# Patient Record
Sex: MALE | Race: WHITE | NOT HISPANIC OR LATINO | Employment: FULL TIME | URBAN - NONMETROPOLITAN AREA
[De-identification: names, ages, dates, MRNs, and addresses within clinical notes are randomized per-mention and may not be internally consistent; named-entity substitution may affect disease eponyms.]

---

## 2020-01-23 ENCOUNTER — OFFICE VISIT (OUTPATIENT)
Dept: FAMILY MEDICINE CLINIC | Facility: CLINIC | Age: 40
End: 2020-01-23

## 2020-01-23 VITALS
DIASTOLIC BLOOD PRESSURE: 80 MMHG | SYSTOLIC BLOOD PRESSURE: 136 MMHG | OXYGEN SATURATION: 98 % | HEART RATE: 86 BPM | TEMPERATURE: 97.7 F | WEIGHT: 246 LBS | BODY MASS INDEX: 34.44 KG/M2 | HEIGHT: 71 IN

## 2020-01-23 DIAGNOSIS — F41.9 ANXIETY: ICD-10-CM

## 2020-01-23 DIAGNOSIS — D17.1 LIPOMA OF BACK: Primary | ICD-10-CM

## 2020-01-23 DIAGNOSIS — Z13.21 ENCOUNTER FOR VITAMIN DEFICIENCY SCREENING: ICD-10-CM

## 2020-01-23 DIAGNOSIS — D17.20 LIPOMA OF UPPER ARM: ICD-10-CM

## 2020-01-23 DIAGNOSIS — Z13.1 DIABETES MELLITUS SCREENING: ICD-10-CM

## 2020-01-23 DIAGNOSIS — I10 ESSENTIAL HYPERTENSION: ICD-10-CM

## 2020-01-23 DIAGNOSIS — R10.826 EPIGASTRIC ABDOMINAL TENDERNESS WITH REBOUND TENDERNESS: ICD-10-CM

## 2020-01-23 LAB
25(OH)D3 SERPL-MCNC: 24.1 NG/ML (ref 30–100)
ALBUMIN SERPL-MCNC: 4.9 G/DL (ref 3.5–5.2)
ALBUMIN/GLOB SERPL: 1.6 G/DL
ALP SERPL-CCNC: 67 U/L (ref 39–117)
ALT SERPL W P-5'-P-CCNC: 24 U/L (ref 1–41)
ANION GAP SERPL CALCULATED.3IONS-SCNC: 16.1 MMOL/L (ref 5–15)
AST SERPL-CCNC: 21 U/L (ref 1–40)
BILIRUB SERPL-MCNC: 0.5 MG/DL (ref 0.2–1.2)
BUN BLD-MCNC: 13 MG/DL (ref 6–20)
BUN/CREAT SERPL: 14.9 (ref 7–25)
CALCIUM SPEC-SCNC: 10.2 MG/DL (ref 8.6–10.5)
CHLORIDE SERPL-SCNC: 100 MMOL/L (ref 98–107)
CHOLEST SERPL-MCNC: 309 MG/DL (ref 0–200)
CO2 SERPL-SCNC: 24.9 MMOL/L (ref 22–29)
CREAT BLD-MCNC: 0.87 MG/DL (ref 0.76–1.27)
DEPRECATED RDW RBC AUTO: 39.1 FL (ref 37–54)
EOSINOPHIL # BLD MANUAL: 0.13 10*3/MM3 (ref 0–0.4)
EOSINOPHIL NFR BLD MANUAL: 2.2 % (ref 0.3–6.2)
ERYTHROCYTE [DISTWIDTH] IN BLOOD BY AUTOMATED COUNT: 12.4 % (ref 12.3–15.4)
GFR SERPL CREATININE-BSD FRML MDRD: 98 ML/MIN/1.73
GLOBULIN UR ELPH-MCNC: 3 GM/DL
GLUCOSE BLD-MCNC: 85 MG/DL (ref 65–99)
HBA1C MFR BLD: 5.53 % (ref 4.8–5.6)
HCT VFR BLD AUTO: 47.3 % (ref 37.5–51)
HDLC SERPL-MCNC: 40 MG/DL (ref 40–60)
HGB BLD-MCNC: 16.1 G/DL (ref 13–17.7)
LDLC SERPL CALC-MCNC: 236 MG/DL (ref 0–100)
LDLC/HDLC SERPL: 5.9 {RATIO}
LYMPHOCYTES # BLD MANUAL: 1.35 10*3/MM3 (ref 0.7–3.1)
LYMPHOCYTES NFR BLD MANUAL: 18.7 % (ref 5–12)
LYMPHOCYTES NFR BLD MANUAL: 23.1 % (ref 19.6–45.3)
MCH RBC QN AUTO: 29.4 PG (ref 26.6–33)
MCHC RBC AUTO-ENTMCNC: 34 G/DL (ref 31.5–35.7)
MCV RBC AUTO: 86.5 FL (ref 79–97)
MONOCYTES # BLD AUTO: 1.1 10*3/MM3 (ref 0.1–0.9)
NEUTROPHILS # BLD AUTO: 3.28 10*3/MM3 (ref 1.7–7)
NEUTROPHILS NFR BLD MANUAL: 56 % (ref 42.7–76)
PLAT MORPH BLD: NORMAL
PLATELET # BLD AUTO: 305 10*3/MM3 (ref 140–450)
PMV BLD AUTO: 10.6 FL (ref 6–12)
POTASSIUM BLD-SCNC: 4.5 MMOL/L (ref 3.5–5.2)
PROT SERPL-MCNC: 7.9 G/DL (ref 6–8.5)
RBC # BLD AUTO: 5.47 10*6/MM3 (ref 4.14–5.8)
RBC MORPH BLD: NORMAL
SMUDGE CELLS BLD QL SMEAR: ABNORMAL
SODIUM BLD-SCNC: 141 MMOL/L (ref 136–145)
T4 FREE SERPL-MCNC: 1.16 NG/DL (ref 0.93–1.7)
TRIGL SERPL-MCNC: 166 MG/DL (ref 0–150)
TSH SERPL DL<=0.05 MIU/L-ACNC: 2.62 UIU/ML (ref 0.27–4.2)
VIT B12 BLD-MCNC: 522 PG/ML (ref 211–946)
VLDLC SERPL-MCNC: 33.2 MG/DL (ref 5–40)
WBC NRBC COR # BLD: 5.86 10*3/MM3 (ref 3.4–10.8)

## 2020-01-23 PROCEDURE — 85007 BL SMEAR W/DIFF WBC COUNT: CPT | Performed by: NURSE PRACTITIONER

## 2020-01-23 PROCEDURE — 83036 HEMOGLOBIN GLYCOSYLATED A1C: CPT | Performed by: NURSE PRACTITIONER

## 2020-01-23 PROCEDURE — 99204 OFFICE O/P NEW MOD 45 MIN: CPT | Performed by: NURSE PRACTITIONER

## 2020-01-23 PROCEDURE — 82607 VITAMIN B-12: CPT | Performed by: NURSE PRACTITIONER

## 2020-01-23 PROCEDURE — 80061 LIPID PANEL: CPT | Performed by: NURSE PRACTITIONER

## 2020-01-23 PROCEDURE — 80053 COMPREHEN METABOLIC PANEL: CPT | Performed by: NURSE PRACTITIONER

## 2020-01-23 PROCEDURE — 84439 ASSAY OF FREE THYROXINE: CPT | Performed by: NURSE PRACTITIONER

## 2020-01-23 PROCEDURE — 84443 ASSAY THYROID STIM HORMONE: CPT | Performed by: NURSE PRACTITIONER

## 2020-01-23 PROCEDURE — 36415 COLL VENOUS BLD VENIPUNCTURE: CPT | Performed by: NURSE PRACTITIONER

## 2020-01-23 PROCEDURE — 82306 VITAMIN D 25 HYDROXY: CPT | Performed by: NURSE PRACTITIONER

## 2020-01-23 PROCEDURE — 85025 COMPLETE CBC W/AUTO DIFF WBC: CPT | Performed by: NURSE PRACTITIONER

## 2020-01-23 RX ORDER — LANSOPRAZOLE 30 MG/1
30 CAPSULE, DELAYED RELEASE ORAL DAILY
Qty: 30 CAPSULE | Refills: 5 | Status: SHIPPED | OUTPATIENT
Start: 2020-01-23 | End: 2020-10-15 | Stop reason: SDUPTHER

## 2020-01-23 RX ORDER — ATENOLOL 50 MG/1
50 TABLET ORAL 2 TIMES DAILY
COMMUNITY
Start: 2019-11-14 | End: 2020-01-23 | Stop reason: SDUPTHER

## 2020-01-23 RX ORDER — ATENOLOL 50 MG/1
50 TABLET ORAL 2 TIMES DAILY
Qty: 60 TABLET | Refills: 5 | Status: SHIPPED | OUTPATIENT
Start: 2020-01-23 | End: 2020-10-15 | Stop reason: SDUPTHER

## 2020-01-23 NOTE — PROGRESS NOTES
"Subjective   George Ribeiro is a 39 y.o. male.     Chief Complaint   Patient presents with   • Establish Care       History of Present Illness     Establish Care-here to establish PCP.  He has been under care of NANCY Baires.  He has not been seen in \"at least a year\".  No recent labs.    HTN-chronic and ongoing.  On Atenolol 50 mg.  No associated symptoms such as CP, SOA, HA, or dizziness.  Has been on for \"years\".  Began on 25 mg due to palpitations per Dr Vail.  He did have cardiac work up including stress and \"heart monitor\".  He reports then was increased to 50 mg.  BP checks at home 120/80's.  HR averages 60's.  He reports an occasional pain in his chest and is concerned about family history of aortic valve replacement his brother in his 30's.  He reports his father's replacement in his 60's.  He does have early family history of MI in multiple family members.  Pain is \"random\".  No daily.  Reports discomfort \"then gone\".  \"does not linger\".  Does not take take his breath but does occasionally have dizziness.  No sensation of syncope.    Abdomen complaint-has had multiple scopes \"both ways\".  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  Reports cramping sensation and nausea.  He reports will resolve with rest.  He reports he has noted that this \"labors his breathing\".  He reports scope showed some stomach irritation.  Pain is always epigastric and occasionally with swallowing he can feel that his food is moving down.  Has been on stomach meds but 'didn't help\" so he stopped the meds.  On Zantac then on Nexium.  Has not been on \"in a while\".  No obvious cough or GERD but does \"clear his throat a lot\".    Frontal HA-throbbing in his face.  Reports only occurs occasional and feels \"is sinus\". He does use OTC meds at times but feels that helps.  He does try to not take meds unless \"bad\".  Has been more consistent the last few weeks as allergy season has been " "more present  Back and neck problem-reports right sided back and and flank pain that lasted 2-3 weeks but has now resolved.  He reports some neck spasm that seems to be on the left and was not able to turn his head.  Has used heat.  Reports no specific injury.  Improved for a few days then returned.    Anxiety-reports he always have some underlying anxiety.  He reports he struggles with crowds and then had a \"full blown attack\".  He reports he woke with neck pain and then escalated to panic as he felt he was \"having a heart attack\".  Lasted 5-10 minutes.  Reports he got breathless and jittery.  He has never been on meds or treated for anxiety.    Lipoma-on Upper extremities and back.  Has been present \"15 years\".  Some have changed in size.  Occasional tenderness.  Has not ever had an evaluation of the soft tissue.      Review of Systems   Constitutional: Negative for appetite change, fatigue and unexpected weight change.   HENT: Positive for rhinorrhea. Negative for congestion, ear pain, nosebleeds, postnasal drip, sore throat, trouble swallowing and voice change.    Eyes: Negative for pain and visual disturbance.   Respiratory: Negative for cough, shortness of breath and wheezing.    Cardiovascular: Negative for chest pain and palpitations.   Gastrointestinal: Positive for abdominal pain. Negative for blood in stool, constipation and diarrhea.   Endocrine: Negative for cold intolerance and polydipsia.   Genitourinary: Negative for difficulty urinating, flank pain and hematuria.   Musculoskeletal: Positive for back pain and neck pain. Negative for arthralgias, gait problem, joint swelling and myalgias.   Skin: Negative for color change and rash.   Allergic/Immunologic: Negative.    Neurological: Positive for headaches. Negative for syncope and numbness.   Hematological: Negative.    Psychiatric/Behavioral: Negative for sleep disturbance and suicidal ideas.   All other systems reviewed and are " "negative.      Objective     /80 (BP Location: Right arm, Patient Position: Sitting, Cuff Size: Adult)   Pulse 86   Temp 97.7 °F (36.5 °C) (Temporal)   Ht 180.3 cm (71\")   Wt 112 kg (246 lb)   SpO2 98%   BMI 34.31 kg/m²     Physical Exam   Constitutional: He is oriented to person, place, and time. He appears well-developed and well-nourished. No distress.   HENT:   Head: Normocephalic and atraumatic.   Right Ear: Hearing, tympanic membrane, external ear and ear canal normal.   Left Ear: Hearing, tympanic membrane, external ear and ear canal normal.   Nose: No mucosal edema or rhinorrhea.   Mouth/Throat: Oropharynx is clear and moist and mucous membranes are normal.   Eyes: Pupils are equal, round, and reactive to light. Conjunctivae, EOM and lids are normal. No scleral icterus. Right eye exhibits normal extraocular motion and no nystagmus. Left eye exhibits normal extraocular motion and no nystagmus.   Neck: Normal range of motion. Neck supple. No JVD present. No tracheal tenderness present. Carotid bruit is not present. No thyromegaly present.   Cardiovascular: Normal rate, regular rhythm, S1 normal, S2 normal, normal heart sounds and intact distal pulses.   No murmur heard.  Pulmonary/Chest: Effort normal and breath sounds normal. He exhibits no tenderness.   Abdominal: Soft. Bowel sounds are normal. He exhibits no mass. There is no hepatosplenomegaly. There is generalized tenderness and tenderness in the epigastric area.   Musculoskeletal: Normal range of motion. He exhibits no edema or tenderness.   Gait normal.   equal bilaterally. No muscular atrophy or flaccidity.  Spastic left trapezius tenderness to left side of C-spine   Lymphadenopathy:     He has no cervical adenopathy.        Right cervical: No superficial cervical adenopathy present.       Left cervical: No superficial cervical adenopathy present.   Neurological: He is alert and oriented to person, place, and time. He has normal " strength and normal reflexes. He displays no tremor. No cranial nerve deficit or sensory deficit. He exhibits normal muscle tone. Coordination and gait normal.   Skin: Skin is warm and dry. Capillary refill takes less than 2 seconds. He is not diaphoretic. No cyanosis. No pallor. Nails show no clubbing.   1 cm mobile area in left upper arm, right upper arm and on mid back area.  Back lesion approx 2 cm   Psychiatric: His speech is normal and behavior is normal. Judgment and thought content normal. His mood appears anxious. He is not actively hallucinating. Cognition and memory are normal. He is attentive.   Vitals reviewed.      Assessment/Plan     Problem List Items Addressed This Visit     None      Visit Diagnoses     Lipoma of back    -  Primary    Relevant Orders    US soft tissue    Lipoma of upper arm        Relevant Orders    US soft tissue    Epigastric abdominal tenderness with rebound tenderness        Relevant Medications    lansoprazole (PREVACID) 30 MG capsule    Anxiety        Relevant Orders    TSH    T4, Free    Essential hypertension        Relevant Medications    atenolol (TENORMIN) 50 MG tablet    Other Relevant Orders    CBC & Differential    Comprehensive Metabolic Panel    Lipid Panel    Encounter for vitamin deficiency screening        Relevant Orders    Vitamin D 25 Hydroxy    Vitamin B12    Diabetes mellitus screening        Relevant Orders    Hemoglobin A1c          Patient's Body mass index is 34.31 kg/m². BMI is above normal parameters. Recommendations include: nutrition counseling.  Understands disease processes and need for medications.  Understands reasons for urgent and emergent care.  Patient (& family) verbalized agreement for treatment plan.   Emotional support and active listening provided.  Patient provided time to verbalize feelings.    US of soft tissue of back and arm  Will plan for cardiac work up.   Discussion regarding GERD and symptoms.  Trial of PPI for symptoms.       Fasting labs ordered.  Will call patient with results and make any meds changes PRN  Will continue Atenolol at present dosing.      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      RTC 1 month, sooner if needed.             This document has been electronically signed by:  NANCY Jessica, FNP-C    Dragon disclaimer:  Much of this encounter note is an electronic transcription/translation of spoken language to printed text. The electronic translation of spoken language may permit erroneous, or at times, nonsensical words or phrases to be inadvertently transcribed; Although I have reviewed the note for such errors, some may still exist.

## 2020-01-24 RX ORDER — ROSUVASTATIN CALCIUM 20 MG/1
20 TABLET, COATED ORAL DAILY
Qty: 30 TABLET | Refills: 5 | Status: SHIPPED | OUTPATIENT
Start: 2020-01-24 | End: 2020-10-15 | Stop reason: SDUPTHER

## 2020-01-29 RX ORDER — BUSPIRONE HYDROCHLORIDE 7.5 MG/1
7.5 TABLET ORAL 3 TIMES DAILY
Qty: 90 TABLET | Refills: 1 | Status: SHIPPED | OUTPATIENT
Start: 2020-01-29 | End: 2020-03-18 | Stop reason: SINTOL

## 2020-03-18 ENCOUNTER — OFFICE VISIT (OUTPATIENT)
Dept: FAMILY MEDICINE CLINIC | Facility: CLINIC | Age: 40
End: 2020-03-18

## 2020-03-18 VITALS
TEMPERATURE: 98.4 F | HEART RATE: 89 BPM | BODY MASS INDEX: 34.02 KG/M2 | HEIGHT: 71 IN | DIASTOLIC BLOOD PRESSURE: 80 MMHG | SYSTOLIC BLOOD PRESSURE: 130 MMHG | OXYGEN SATURATION: 95 % | WEIGHT: 243 LBS

## 2020-03-18 DIAGNOSIS — R00.2 PALPITATIONS: ICD-10-CM

## 2020-03-18 DIAGNOSIS — F40.10 SOCIAL ANXIETY DISORDER: Primary | ICD-10-CM

## 2020-03-18 DIAGNOSIS — R10.826 EPIGASTRIC ABDOMINAL TENDERNESS WITH REBOUND TENDERNESS: ICD-10-CM

## 2020-03-18 DIAGNOSIS — I10 ESSENTIAL HYPERTENSION: ICD-10-CM

## 2020-03-18 PROBLEM — H93.13 TINNITUS OF BOTH EARS: Status: ACTIVE | Noted: 2020-03-18

## 2020-03-18 PROCEDURE — 99214 OFFICE O/P EST MOD 30 MIN: CPT | Performed by: NURSE PRACTITIONER

## 2020-03-18 RX ORDER — CITALOPRAM 10 MG/1
10 TABLET ORAL DAILY
Qty: 30 TABLET | Refills: 5 | Status: SHIPPED | OUTPATIENT
Start: 2020-03-18 | End: 2020-07-16 | Stop reason: DRUGHIGH

## 2020-03-18 NOTE — ASSESSMENT & PLAN NOTE
Continue Prevacid.  Advised to avoid known GI triggers such as spicy foods.  Upright 30 minutes after meals and avoid eating large meals.  Several small meals daily as able to avoid overfilling stomach.

## 2020-03-18 NOTE — PROGRESS NOTES
"Subjective   George Ribeiro is a 39 y.o. male.     Chief Complaint   Patient presents with   • Hypertension       History of Present Illness     HTN/Palpitations-chronic and ongoing.  On Atenolol 50 mg.  No negative side effects.  No associated symptoms such as CP, SOA, HA, or dizziness.  Checks at home has been normal.  Mild up on triage but improved on Exam.  He reports that due to his family history he would like to have stress testing performed for heart evaluation.  He does have family members with valve disorders.  Ongoing  Anxiety-reports buspar made him more anxious.  He reports he took it for approx 5 days.  He reports he felt \"jittery\".  He reports mostly social situations.  Small groups tend to make him \"uncomfortable\".  He feels he has a problem \"even in school\".  Not at goal.    Tinnitus-reports \"always had some ringing\" but now has progressed.  He reports it feels like in both ears.  He reports some difficulty distinguishing which direction noises come from at times.  He does have some fluid in ears at times.  Some occasional ear discomfort but then resolves.  He does not feel popping.  He reports if there is no other noise in the room in the room \"that is all I hear\".  Ongoing.    The following portions of the patient's history were reviewed and updated as appropriate: CC, ROS, allergies, current medications, past family history, past medical history, past social history, past surgical history and problem list.      Review of Systems   Constitutional: Negative for appetite change, fatigue and unexpected weight change.   HENT: Positive for rhinorrhea. Negative for congestion, ear pain, nosebleeds, postnasal drip, sore throat, trouble swallowing and voice change.    Eyes: Negative for pain and visual disturbance.   Respiratory: Negative for cough, shortness of breath and wheezing.    Cardiovascular: Negative for chest pain and palpitations.   Gastrointestinal: Positive for abdominal pain. Negative " "for blood in stool, constipation and diarrhea.   Endocrine: Negative for cold intolerance and polydipsia.   Genitourinary: Negative for difficulty urinating, flank pain and hematuria.   Musculoskeletal: Positive for back pain and neck pain. Negative for arthralgias, gait problem, joint swelling and myalgias.   Skin: Negative for color change and rash.   Allergic/Immunologic: Negative.    Neurological: Positive for headaches. Negative for syncope and numbness.   Hematological: Negative.    Psychiatric/Behavioral: Negative for sleep disturbance and suicidal ideas.   All other systems reviewed and are negative.      Objective     /80   Pulse 89   Temp 98.4 °F (36.9 °C) (Temporal)   Ht 180.3 cm (71\")   Wt 110 kg (243 lb)   SpO2 95%   BMI 33.89 kg/m²     Physical Exam   Constitutional: He is oriented to person, place, and time. He appears well-developed and well-nourished. No distress.   HENT:   Head: Normocephalic and atraumatic.   Right Ear: External ear and ear canal normal. Tympanic membrane is retracted. Decreased hearing is noted.   Left Ear: Hearing, external ear and ear canal normal. A middle ear effusion is present.   Nose: No mucosal edema or rhinorrhea.   Mouth/Throat: Oropharynx is clear and moist and mucous membranes are normal.   Eyes: Pupils are equal, round, and reactive to light. Conjunctivae, EOM and lids are normal. No scleral icterus. Right eye exhibits normal extraocular motion and no nystagmus. Left eye exhibits normal extraocular motion and no nystagmus.   Neck: Normal range of motion. Neck supple. No JVD present. No tracheal tenderness present. Carotid bruit is not present. No thyromegaly present.   Cardiovascular: Normal rate, regular rhythm, S1 normal, S2 normal, normal heart sounds and intact distal pulses.   No murmur heard.  Pulmonary/Chest: Effort normal and breath sounds normal. He exhibits no tenderness.   Abdominal: Soft. Bowel sounds are normal. He exhibits no mass. There is " no hepatosplenomegaly. There is no tenderness.   Musculoskeletal: Normal range of motion. He exhibits no edema or tenderness.   Gait normal.   equal bilaterally. No muscular atrophy or flaccidity.  Spastic left trapezius tenderness to left side of C-spine   Lymphadenopathy:     He has no cervical adenopathy.        Right cervical: No superficial cervical adenopathy present.       Left cervical: No superficial cervical adenopathy present.   Neurological: He is alert and oriented to person, place, and time. He has normal strength and normal reflexes. He displays no tremor. No cranial nerve deficit or sensory deficit. He exhibits normal muscle tone. Coordination and gait normal.   Skin: Skin is warm and dry. Capillary refill takes less than 2 seconds. He is not diaphoretic. No cyanosis. No pallor. Nails show no clubbing.   1 cm mobile area in left upper arm, right upper arm and on mid back area.  Back lesion approx 2 cm   Psychiatric: His speech is normal and behavior is normal. Judgment and thought content normal. His mood appears anxious. He is not actively hallucinating. Cognition and memory are normal. He is attentive.   Vitals reviewed.      Assessment/Plan     Problem List Items Addressed This Visit        Cardiovascular and Mediastinum    Palpitations    Current Assessment & Plan     Continue atenolol.  Encouraged to work on stress reduction and limit caffeine intake         Essential hypertension    Current Assessment & Plan     Continue atenolol 50 mg twice daily.  Ambulatory BP monitoring either at home or random community checks.  Patient to report continued elevations >140/90.  Patient may come by office for checks if needed.             Nervous and Auditory    Epigastric abdominal tenderness with rebound tenderness    Current Assessment & Plan     Continue Prevacid.  Advised to avoid known GI triggers such as spicy foods.  Upright 30 minutes after meals and avoid eating large meals.  Several small  meals daily as able to avoid overfilling stomach.                Other    Social anxiety disorder - Primary    Relevant Medications    citalopram (CeleXA) 10 MG tablet        Patient's Body mass index is 33.89 kg/m². BMI is above normal parameters. Recommendations include: nutrition counseling.  Emotional support and active listening provided.  Patient provided time to verbalize feelings.  Understands disease processes and need for medications.  Understands reasons for urgent and emergent care.  Patient (& family) verbalized agreement for treatment plan.     Encouraged patient to activate MyChart to aide with care during recent illness outbreak.      RTC 3-4 months, sooner if needed.             This document has been electronically signed by:  NANCY Jessica, FNP-C    Dragon disclaimer:  Much of this encounter note is an electronic transcription/translation of spoken language to printed text. The electronic translation of spoken language may permit erroneous, or at times, nonsensical words or phrases to be inadvertently transcribed; Although I have reviewed the note for such errors, some may still exist.

## 2020-03-18 NOTE — ASSESSMENT & PLAN NOTE
Continue atenolol 50 mg twice daily.  Ambulatory BP monitoring either at home or random community checks.  Patient to report continued elevations >140/90.  Patient may come by office for checks if needed.

## 2020-07-16 ENCOUNTER — OFFICE VISIT (OUTPATIENT)
Dept: FAMILY MEDICINE CLINIC | Facility: CLINIC | Age: 40
End: 2020-07-16

## 2020-07-16 VITALS
BODY MASS INDEX: 34.3 KG/M2 | SYSTOLIC BLOOD PRESSURE: 128 MMHG | TEMPERATURE: 97.1 F | HEIGHT: 71 IN | HEART RATE: 108 BPM | WEIGHT: 245 LBS | OXYGEN SATURATION: 98 % | DIASTOLIC BLOOD PRESSURE: 80 MMHG

## 2020-07-16 DIAGNOSIS — E78.2 MIXED HYPERLIPIDEMIA: ICD-10-CM

## 2020-07-16 DIAGNOSIS — I10 ESSENTIAL HYPERTENSION: ICD-10-CM

## 2020-07-16 DIAGNOSIS — R00.2 PALPITATIONS: ICD-10-CM

## 2020-07-16 DIAGNOSIS — R10.826 EPIGASTRIC ABDOMINAL TENDERNESS WITH REBOUND TENDERNESS: ICD-10-CM

## 2020-07-16 DIAGNOSIS — E55.9 VITAMIN D DEFICIENCY: ICD-10-CM

## 2020-07-16 DIAGNOSIS — F40.10 SOCIAL ANXIETY DISORDER: Primary | ICD-10-CM

## 2020-07-16 PROCEDURE — 36415 COLL VENOUS BLD VENIPUNCTURE: CPT | Performed by: NURSE PRACTITIONER

## 2020-07-16 PROCEDURE — 85025 COMPLETE CBC W/AUTO DIFF WBC: CPT | Performed by: NURSE PRACTITIONER

## 2020-07-16 PROCEDURE — 80053 COMPREHEN METABOLIC PANEL: CPT | Performed by: NURSE PRACTITIONER

## 2020-07-16 PROCEDURE — 99214 OFFICE O/P EST MOD 30 MIN: CPT | Performed by: NURSE PRACTITIONER

## 2020-07-16 PROCEDURE — 80061 LIPID PANEL: CPT | Performed by: NURSE PRACTITIONER

## 2020-07-16 PROCEDURE — 82306 VITAMIN D 25 HYDROXY: CPT | Performed by: NURSE PRACTITIONER

## 2020-07-16 RX ORDER — CITALOPRAM 20 MG/1
20 TABLET ORAL DAILY
Qty: 30 TABLET | Refills: 5 | Status: SHIPPED | OUTPATIENT
Start: 2020-07-16 | End: 2021-06-16 | Stop reason: SDUPTHER

## 2020-07-16 NOTE — PROGRESS NOTES
"Subjective   George Ribeiro is a 40 y.o. male.     Chief Complaint   Patient presents with   • Heartburn   • Anxiety       History of Present Illness     GERD-Ongoing.  On Prevacid 30 mg.  No negative side effects.  Denies any recent exacerbations.  He reports he has not been able to get due to his insurance not paying for just a \"90 day supply.\"  He reports he did not realize how much it was helping until he has been out a month.  He reports he ate some chips and \"paid for it for 3 days.\"  Chest Pain-\"not any better\".  He did not go to stress test due to COVID.  He would like to have it rescheduled.  He is taking Atenolol 50 mg.  No negative side effects.  BP has been stable and he reports he has not noted any elevation.  He reports palpitations yesterday \"For a few seconds\" but had \"been a while\" since had some.    Right ear aching-has been intermittent.  He reports \"dull and aching\" but not daily.  He reports \"sinus\".   No HA or PND.    Anxiety-ongoing.  He has noted some mild improvement on Celexa 10 mg.  He reports he feels he continues to have some symptoms.  He does feel is stressing less but \"could be better\".      The following portions of the patient's history were reviewed and updated as appropriate: CC, ROS, allergies, current medications, past family history, past medical history, past social history, past surgical history and problem list.      Review of Systems   Constitutional: Negative for appetite change, fatigue and unexpected weight change.   HENT: Positive for rhinorrhea. Negative for congestion, ear pain, nosebleeds, postnasal drip, sore throat, trouble swallowing and voice change.    Eyes: Negative for pain and visual disturbance.   Respiratory: Negative for cough, shortness of breath and wheezing.    Cardiovascular: Negative for chest pain and palpitations.   Gastrointestinal: Positive for abdominal pain and nausea. Negative for blood in stool, constipation and diarrhea.   Endocrine: " "Negative for cold intolerance and polydipsia.   Genitourinary: Negative for difficulty urinating, flank pain and hematuria.   Musculoskeletal: Positive for back pain and neck pain. Negative for arthralgias, gait problem, joint swelling and myalgias.   Skin: Negative for color change and rash.   Allergic/Immunologic: Negative.    Neurological: Positive for dizziness. Negative for syncope, numbness and headaches.   Hematological: Negative.    Psychiatric/Behavioral: Negative for sleep disturbance and suicidal ideas.   All other systems reviewed and are negative.      Objective     /80   Pulse 108   Temp 97.1 °F (36.2 °C) (Temporal)   Ht 180.3 cm (71\")   Wt 111 kg (245 lb)   SpO2 98%   BMI 34.17 kg/m²     Physical Exam   Constitutional: He is oriented to person, place, and time. He appears well-developed and well-nourished. No distress.   HENT:   Head: Normocephalic and atraumatic.   Right Ear: External ear and ear canal normal. Tympanic membrane is retracted. Decreased hearing is noted.   Left Ear: Hearing, external ear and ear canal normal. A middle ear effusion is present.   Oropharynx not examined due to patient wearing a face covering over mouth and nose due to COVID 19 pandemic.   Eyes: Pupils are equal, round, and reactive to light. Conjunctivae, EOM and lids are normal. No scleral icterus. Right eye exhibits normal extraocular motion and no nystagmus. Left eye exhibits normal extraocular motion and no nystagmus.   Neck: Normal range of motion. Neck supple. No JVD present. No tracheal tenderness present. Carotid bruit is not present. No thyromegaly present.   Cardiovascular: Normal rate, regular rhythm, S1 normal, S2 normal, normal heart sounds and intact distal pulses.   No murmur heard.  Pulmonary/Chest: Effort normal and breath sounds normal. He exhibits no tenderness.   Abdominal: Soft. Bowel sounds are normal. He exhibits no mass. There is no hepatosplenomegaly. There is no tenderness.   "   Musculoskeletal: Normal range of motion. He exhibits no edema or tenderness.   Gait normal.   equal bilaterally. No muscular atrophy or flaccidity.  Spastic left trapezius tenderness to left side of C-spine   Lymphadenopathy:     He has no cervical adenopathy.        Right cervical: No superficial cervical adenopathy present.       Left cervical: No superficial cervical adenopathy present.   Neurological: He is alert and oriented to person, place, and time. He has normal strength and normal reflexes. He displays no tremor. No cranial nerve deficit or sensory deficit. He exhibits normal muscle tone. Coordination and gait normal.   Skin: Skin is warm and dry. Capillary refill takes less than 2 seconds. He is not diaphoretic. No cyanosis. No pallor. Nails show no clubbing.   1 cm mobile area in left upper arm, right upper arm and on mid back area.  Back lesion approx 2 cm   Psychiatric: His speech is normal and behavior is normal. Judgment and thought content normal. His mood appears anxious. He is not actively hallucinating. Cognition and memory are normal. He is attentive.   Vitals reviewed.      Assessment/Plan     Problem List Items Addressed This Visit        Cardiovascular and Mediastinum    Palpitations    Relevant Orders    Stress test    Essential hypertension    Relevant Orders    Stress test    Comprehensive Metabolic Panel    CBC & Differential    CBC Auto Differential       Nervous and Auditory    Epigastric abdominal tenderness with rebound tenderness    Current Assessment & Plan     Continue diet changes.            Other    Social anxiety disorder - Primary    Relevant Medications    citalopram (CeleXA) 20 MG tablet      Other Visit Diagnoses     Mixed hyperlipidemia        Relevant Orders    Lipid Panel    Vitamin D deficiency        Relevant Orders    Vitamin D 25 Hydroxy          Patient's Body mass index is 34.17 kg/m². BMI is above normal parameters. Recommendations include: nutrition  counseling.     Understands disease processes and need for medications.  Understands reasons for urgent and emergent care.  Patient (& family) verbalized agreement for treatment plan.   Emotional support and active listening provided.  Patient provided time to verbalize feelings.    Will reorder stress test.      Will attempt to PA prevacid.  Encourage patient to check cost OTC until PA could be obtained.  Labs today for cholesterol and vit D check.      RTC 3 months, sooner if needed.             This document has been electronically signed by:  NANCY Jessica, FNP-C    Dragon disclaimer:  Much of this encounter note is an electronic transcription/translation of spoken language to printed text. The electronic translation of spoken language may permit erroneous, or at times, nonsensical words or phrases to be inadvertently transcribed; Although I have reviewed the note for such errors, some may still exist.

## 2020-07-17 LAB
25(OH)D3 SERPL-MCNC: 32.2 NG/ML (ref 30–100)
ALBUMIN SERPL-MCNC: 4.9 G/DL (ref 3.5–5.2)
ALBUMIN/GLOB SERPL: 1.6 G/DL
ALP SERPL-CCNC: 64 U/L (ref 39–117)
ALT SERPL W P-5'-P-CCNC: 39 U/L (ref 1–41)
ANION GAP SERPL CALCULATED.3IONS-SCNC: 12 MMOL/L (ref 5–15)
AST SERPL-CCNC: 26 U/L (ref 1–40)
BASOPHILS # BLD AUTO: 0.07 10*3/MM3 (ref 0–0.2)
BASOPHILS NFR BLD AUTO: 1.2 % (ref 0–1.5)
BILIRUB SERPL-MCNC: 0.8 MG/DL (ref 0–1.2)
BUN SERPL-MCNC: 13 MG/DL (ref 6–20)
BUN/CREAT SERPL: 17.6 (ref 7–25)
CALCIUM SPEC-SCNC: 9.8 MG/DL (ref 8.6–10.5)
CHLORIDE SERPL-SCNC: 102 MMOL/L (ref 98–107)
CHOLEST SERPL-MCNC: 234 MG/DL (ref 0–200)
CO2 SERPL-SCNC: 24 MMOL/L (ref 22–29)
CREAT SERPL-MCNC: 0.74 MG/DL (ref 0.76–1.27)
DEPRECATED RDW RBC AUTO: 40 FL (ref 37–54)
EOSINOPHIL # BLD AUTO: 0.07 10*3/MM3 (ref 0–0.4)
EOSINOPHIL NFR BLD AUTO: 1.2 % (ref 0.3–6.2)
ERYTHROCYTE [DISTWIDTH] IN BLOOD BY AUTOMATED COUNT: 12.8 % (ref 12.3–15.4)
GFR SERPL CREATININE-BSD FRML MDRD: 117 ML/MIN/1.73
GLOBULIN UR ELPH-MCNC: 3 GM/DL
GLUCOSE SERPL-MCNC: 85 MG/DL (ref 65–99)
HCT VFR BLD AUTO: 45.6 % (ref 37.5–51)
HDLC SERPL-MCNC: 45 MG/DL (ref 40–60)
HGB BLD-MCNC: 15.6 G/DL (ref 13–17.7)
IMM GRANULOCYTES # BLD AUTO: 0.02 10*3/MM3 (ref 0–0.05)
IMM GRANULOCYTES NFR BLD AUTO: 0.3 % (ref 0–0.5)
LDLC SERPL CALC-MCNC: 157 MG/DL (ref 0–100)
LDLC/HDLC SERPL: 3.48 {RATIO}
LYMPHOCYTES # BLD AUTO: 2.23 10*3/MM3 (ref 0.7–3.1)
LYMPHOCYTES NFR BLD AUTO: 37.7 % (ref 19.6–45.3)
MCH RBC QN AUTO: 29.8 PG (ref 26.6–33)
MCHC RBC AUTO-ENTMCNC: 34.2 G/DL (ref 31.5–35.7)
MCV RBC AUTO: 87 FL (ref 79–97)
MONOCYTES # BLD AUTO: 0.5 10*3/MM3 (ref 0.1–0.9)
MONOCYTES NFR BLD AUTO: 8.4 % (ref 5–12)
NEUTROPHILS NFR BLD AUTO: 3.03 10*3/MM3 (ref 1.7–7)
NEUTROPHILS NFR BLD AUTO: 51.2 % (ref 42.7–76)
NRBC BLD AUTO-RTO: 0 /100 WBC (ref 0–0.2)
PLATELET # BLD AUTO: 293 10*3/MM3 (ref 140–450)
PMV BLD AUTO: 10.5 FL (ref 6–12)
POTASSIUM SERPL-SCNC: 4.3 MMOL/L (ref 3.5–5.2)
PROT SERPL-MCNC: 7.9 G/DL (ref 6–8.5)
RBC # BLD AUTO: 5.24 10*6/MM3 (ref 4.14–5.8)
SODIUM SERPL-SCNC: 138 MMOL/L (ref 136–145)
TRIGL SERPL-MCNC: 162 MG/DL (ref 0–150)
VLDLC SERPL-MCNC: 32.4 MG/DL (ref 5–40)
WBC # BLD AUTO: 5.92 10*3/MM3 (ref 3.4–10.8)

## 2020-07-19 NOTE — PROGRESS NOTES
Vitamin D is normal.  Kidney and liver function is normal.  His cholesterol is improving.  It is down from 309.  It is currently 234.  Continue his medications and diet changes.  His blood count is normal

## 2020-07-27 ENCOUNTER — HOSPITAL ENCOUNTER (OUTPATIENT)
Dept: CARDIOLOGY | Facility: HOSPITAL | Age: 40
Discharge: HOME | End: 2020-07-27
Admitting: NURSE PRACTITIONER

## 2020-07-27 DIAGNOSIS — R00.2 PALPITATIONS: ICD-10-CM

## 2020-07-27 DIAGNOSIS — I10 ESSENTIAL HYPERTENSION: ICD-10-CM

## 2020-07-27 LAB
BH CV STRESS BP STAGE 1: NORMAL
BH CV STRESS BP STAGE 2: NORMAL
BH CV STRESS BP STAGE 3: NORMAL
BH CV STRESS DURATION MIN STAGE 1: 3
BH CV STRESS DURATION MIN STAGE 2: 3
BH CV STRESS DURATION MIN STAGE 3: 3
BH CV STRESS DURATION SEC STAGE 1: 0
BH CV STRESS DURATION SEC STAGE 2: 0
BH CV STRESS DURATION SEC STAGE 3: 0
BH CV STRESS GRADE STAGE 1: 10
BH CV STRESS GRADE STAGE 2: 12
BH CV STRESS GRADE STAGE 3: 14
BH CV STRESS HR STAGE 1: 108
BH CV STRESS HR STAGE 2: 128
BH CV STRESS HR STAGE 3: 161
BH CV STRESS METS STAGE 1: 5
BH CV STRESS METS STAGE 2: 7.5
BH CV STRESS METS STAGE 3: 10
BH CV STRESS PROTOCOL 1: NORMAL
BH CV STRESS RECOVERY BP: NORMAL MMHG
BH CV STRESS RECOVERY HR: 92 BPM
BH CV STRESS SPEED STAGE 1: 1.7
BH CV STRESS SPEED STAGE 2: 2.5
BH CV STRESS SPEED STAGE 3: 3.4
BH CV STRESS STAGE 1: 1
BH CV STRESS STAGE 2: 2
BH CV STRESS STAGE 3: 3
MAXIMAL PREDICTED HEART RATE: 180 BPM
PERCENT MAX PREDICTED HR: 89.44 %
STRESS BASELINE BP: NORMAL MMHG
STRESS BASELINE HR: 85 BPM
STRESS PERCENT HR: 105 %
STRESS POST ESTIMATED WORKLOAD: 10.1 METS
STRESS POST EXERCISE DUR MIN: 9 MIN
STRESS POST EXERCISE DUR SEC: 0 SEC
STRESS POST PEAK BP: NORMAL MMHG
STRESS POST PEAK HR: 161 BPM
STRESS TARGET HR: 153 BPM

## 2020-07-27 PROCEDURE — 93018 CV STRESS TEST I&R ONLY: CPT | Performed by: INTERNAL MEDICINE

## 2020-07-27 PROCEDURE — 93017 CV STRESS TEST TRACING ONLY: CPT

## 2020-10-15 ENCOUNTER — OFFICE VISIT (OUTPATIENT)
Dept: FAMILY MEDICINE CLINIC | Facility: CLINIC | Age: 40
End: 2020-10-15

## 2020-10-15 VITALS
SYSTOLIC BLOOD PRESSURE: 136 MMHG | WEIGHT: 249.2 LBS | DIASTOLIC BLOOD PRESSURE: 80 MMHG | HEART RATE: 78 BPM | BODY MASS INDEX: 34.89 KG/M2 | OXYGEN SATURATION: 97 % | HEIGHT: 71 IN | TEMPERATURE: 97.1 F

## 2020-10-15 DIAGNOSIS — R10.826 EPIGASTRIC ABDOMINAL TENDERNESS WITH REBOUND TENDERNESS: ICD-10-CM

## 2020-10-15 DIAGNOSIS — Z23 ENCOUNTER FOR IMMUNIZATION: Primary | ICD-10-CM

## 2020-10-15 DIAGNOSIS — R00.2 PALPITATIONS: ICD-10-CM

## 2020-10-15 DIAGNOSIS — I10 ESSENTIAL HYPERTENSION: ICD-10-CM

## 2020-10-15 DIAGNOSIS — H93.13 TINNITUS OF BOTH EARS: ICD-10-CM

## 2020-10-15 PROCEDURE — 90471 IMMUNIZATION ADMIN: CPT | Performed by: NURSE PRACTITIONER

## 2020-10-15 PROCEDURE — 90686 IIV4 VACC NO PRSV 0.5 ML IM: CPT | Performed by: NURSE PRACTITIONER

## 2020-10-15 PROCEDURE — 99214 OFFICE O/P EST MOD 30 MIN: CPT | Performed by: NURSE PRACTITIONER

## 2020-10-15 RX ORDER — ROSUVASTATIN CALCIUM 20 MG/1
20 TABLET, COATED ORAL DAILY
Qty: 30 TABLET | Refills: 5 | Status: SHIPPED | OUTPATIENT
Start: 2020-10-15 | End: 2021-06-16 | Stop reason: SDUPTHER

## 2020-10-15 RX ORDER — ATENOLOL 50 MG/1
50 TABLET ORAL 2 TIMES DAILY
Qty: 60 TABLET | Refills: 5 | Status: SHIPPED | OUTPATIENT
Start: 2020-10-15 | End: 2021-06-16 | Stop reason: SDUPTHER

## 2020-10-15 RX ORDER — LANSOPRAZOLE 30 MG/1
30 CAPSULE, DELAYED RELEASE ORAL DAILY
Qty: 30 CAPSULE | Refills: 5 | Status: SHIPPED | OUTPATIENT
Start: 2020-10-15 | End: 2021-06-16 | Stop reason: SDUPTHER

## 2020-10-15 NOTE — PROGRESS NOTES
"Subjective   George Ribeiro is a 40 y.o. male.     Chief Complaint   Patient presents with   • Hypertension       History of Present Illness     HTN/Palpitations-has taken atenolol this morning.  Mild elevation on triage of 140/86.  No negative side effects.  PVC's on Stress test.  He reports  He was mildly anxious coming in but \"am better now that I am here\".     Anxiety-chronic and ongoing.  On Celexa 20 mg.  He does feel he is doing mildly better.  He reports some anticipation anxiety but \"doing better in social situations\".     Tinnitus-reports he is having increased ringing and would like to see audiology for problem as he has some family members who have had hearing loss after extensive ringing.    GERD-on Prevacid 30 mg.  No negative side effects of medication.  Patient does avoid foods that trigger reflux symptoms.  Denies any recent exacerbations.    The following portions of the patient's history were reviewed and updated as appropriate: CC, ROS, allergies, current medications, past family history, past medical history, past social history, past surgical history and problem list.      Review of Systems   Constitutional: Negative for appetite change, fatigue, unexpected weight gain and unexpected weight loss.   HENT: Negative for congestion, ear pain, postnasal drip, rhinorrhea, sore throat, swollen glands, trouble swallowing and voice change.    Eyes: Negative for pain and visual disturbance.   Respiratory: Negative for cough, chest tightness, shortness of breath and wheezing.    Cardiovascular: Positive for palpitations. Negative for chest pain and leg swelling.   Gastrointestinal: Negative for abdominal pain, blood in stool, constipation, diarrhea, nausea and indigestion.   Genitourinary: Negative for dysuria, hematuria and urgency.   Musculoskeletal: Negative for arthralgias, back pain, gait problem, joint swelling and myalgias.   Skin: Negative for color change and skin lesions.   " "  Allergic/Immunologic: Negative.    Neurological: Negative for numbness, headache and confusion.   Hematological: Negative.    Psychiatric/Behavioral: Negative for dysphoric mood, sleep disturbance and suicidal ideas. The patient is nervous/anxious.    All other systems reviewed and are negative.      Objective     /80   Pulse 78   Temp 97.1 °F (36.2 °C) (Temporal)   Ht 180.3 cm (71\")   Wt 113 kg (249 lb 3.2 oz)   SpO2 97%   BMI 34.76 kg/m²     Physical Exam  Vitals signs reviewed.   Constitutional:       General: He is not in acute distress.     Appearance: He is well-developed. He is not diaphoretic.   HENT:      Head: Normocephalic and atraumatic.      Comments: Oropharynx not examined.  Patient is presently wearing a face covering/mask due to COVID-19 pandemic.     Right Ear: Tympanic membrane, ear canal and external ear normal. Decreased hearing noted.      Left Ear: Tympanic membrane, ear canal and external ear normal. Decreased hearing noted.   Eyes:      General: Lids are normal. No scleral icterus.     Extraocular Movements:      Right eye: Normal extraocular motion and no nystagmus.      Left eye: Normal extraocular motion and no nystagmus.      Conjunctiva/sclera: Conjunctivae normal.      Pupils: Pupils are equal, round, and reactive to light.   Neck:      Musculoskeletal: Normal range of motion and neck supple.      Thyroid: No thyromegaly.      Vascular: No carotid bruit or JVD.      Trachea: No tracheal tenderness.   Cardiovascular:      Rate and Rhythm: Normal rate and regular rhythm.      Heart sounds: Normal heart sounds, S1 normal and S2 normal. No murmur.   Pulmonary:      Effort: Pulmonary effort is normal.      Breath sounds: Normal breath sounds.   Chest:      Chest wall: No tenderness.   Abdominal:      General: Bowel sounds are normal.      Palpations: Abdomen is soft. There is no mass.      Tenderness: There is no abdominal tenderness.   Musculoskeletal: Normal range of motion. "         General: No tenderness.      Right lower leg: No edema.      Left lower leg: No edema.      Comments: Gait normal.   equal bilaterally. No muscular atrophy or flaccidity.   Lymphadenopathy:      Cervical: No cervical adenopathy.      Right cervical: No superficial cervical adenopathy.     Left cervical: No superficial cervical adenopathy.   Skin:     General: Skin is warm and dry.      Capillary Refill: Capillary refill takes less than 2 seconds.      Coloration: Skin is not pale.      Findings: No erythema.      Nails: There is no clubbing.        Comments: 1 cm mobile area in left upper arm, right upper arm and on mid back area.  Back lesion approx 2 cm   Neurological:      Mental Status: He is alert and oriented to person, place, and time.      Cranial Nerves: No cranial nerve deficit.      Sensory: No sensory deficit.      Motor: No tremor, atrophy or abnormal muscle tone.      Coordination: Coordination normal.      Gait: Gait normal.      Deep Tendon Reflexes: Reflexes are normal and symmetric.   Psychiatric:         Attention and Perception: He is attentive.         Mood and Affect: Mood is anxious.         Speech: Speech normal.         Behavior: Behavior normal.         Thought Content: Thought content normal.         Judgment: Judgment normal.         Assessment/Plan     Problem List Items Addressed This Visit        Cardiovascular and Mediastinum    Palpitations    Current Assessment & Plan     PVC's noted on Stress test.  Continue Atenolol.         Relevant Orders    Adult Transthoracic Echo Complete W/ Cont if Necessary Per Protocol    Essential hypertension    Current Assessment & Plan     Stable on Atenolol.  Continue as directed.  No associated symptoms such as CP, SOA, HA, or dizziness.           Relevant Medications    atenolol (TENORMIN) 50 MG tablet       Nervous and Auditory    Epigastric abdominal tenderness with rebound tenderness    Relevant Medications    lansoprazole (PREVACID)  30 MG capsule    Tinnitus of both ears    Current Assessment & Plan     Referral for evaluation today.         Relevant Orders    Ambulatory Referral to Audiology (Completed)      Other Visit Diagnoses     Encounter for immunization    -  Primary    Relevant Orders    FluLaval Quad >6 Months ([id number]) (Completed)          Patient's Body mass index is 34.76 kg/m². BMI is above normal parameters. Recommendations include: nutrition counseling.     Understands disease processes and need for medications.  Understands reasons for urgent and emergent care.  Patient (& family) verbalized agreement for treatment plan.   Emotional support and active listening provided.  Patient provided time to verbalize feelings.    Reviewed labs and stress test.  Will plan for ECHO due to family history of valve disorder.     Flu vaccine administered today.  Understands risk and benefits.  Understands may be 2-3 week before immunity is obtained.  Information sheet provided today on the [id number] vax.    RTC 3 months, sooner if needed.           This document has been electronically signed by:  NANCY Jessica FNP-C Dragon disclaimer:  Much of this encounter note is an electronic transcription/translation of spoken language to printed text. The electronic translation of spoken language may permit erroneous, or at times, nonsensical words or phrases to be inadvertently transcribed; Although I have reviewed the note for such errors, some may still exist.

## 2020-10-15 NOTE — ASSESSMENT & PLAN NOTE
Stable on Atenolol.  Continue as directed.  No associated symptoms such as CP, SOA, HA, or dizziness.

## 2020-10-23 ENCOUNTER — HOSPITAL ENCOUNTER (OUTPATIENT)
Dept: CARDIOLOGY | Facility: HOSPITAL | Age: 40
Discharge: HOME | End: 2020-10-23
Admitting: NURSE PRACTITIONER

## 2020-10-23 LAB
BH CV ECHO MEAS - % IVS THICK: -22.4 %
BH CV ECHO MEAS - % LVPW THICK: -9.7 %
BH CV ECHO MEAS - ACS: 2.7 CM
BH CV ECHO MEAS - AO MAX PG: 4.8 MMHG
BH CV ECHO MEAS - AO MEAN PG: 2 MMHG
BH CV ECHO MEAS - AO ROOT AREA (BSA CORRECTED): 1.7
BH CV ECHO MEAS - AO ROOT AREA: 12.6 CM^2
BH CV ECHO MEAS - AO ROOT DIAM: 4 CM
BH CV ECHO MEAS - AO V2 MAX: 110 CM/SEC
BH CV ECHO MEAS - AO V2 MEAN: 70.3 CM/SEC
BH CV ECHO MEAS - AO V2 VTI: 24.2 CM
BH CV ECHO MEAS - BSA(HAYCOCK): 2.4 M^2
BH CV ECHO MEAS - BSA: 2.3 M^2
BH CV ECHO MEAS - BZI_BMI: 34.7 KILOGRAMS/M^2
BH CV ECHO MEAS - BZI_METRIC_HEIGHT: 180.3 CM
BH CV ECHO MEAS - BZI_METRIC_WEIGHT: 112.9 KG
BH CV ECHO MEAS - EDV(CUBED): 119.8 ML
BH CV ECHO MEAS - EDV(MOD-SP4): 110 ML
BH CV ECHO MEAS - EDV(TEICH): 114.4 ML
BH CV ECHO MEAS - EF(CUBED): 65.7 %
BH CV ECHO MEAS - EF(MOD-SP4): 63.4 %
BH CV ECHO MEAS - EF(TEICH): 57.1 %
BH CV ECHO MEAS - ESV(CUBED): 41.1 ML
BH CV ECHO MEAS - ESV(MOD-SP4): 40.3 ML
BH CV ECHO MEAS - ESV(TEICH): 49.1 ML
BH CV ECHO MEAS - FS: 30 %
BH CV ECHO MEAS - IVS/LVPW: 0.96
BH CV ECHO MEAS - IVSD: 1.3 CM
BH CV ECHO MEAS - IVSS: 1 CM
BH CV ECHO MEAS - LA DIMENSION: 3.8 CM
BH CV ECHO MEAS - LA/AO: 0.95
BH CV ECHO MEAS - LV DIASTOLIC VOL/BSA (35-75): 47.5 ML/M^2
BH CV ECHO MEAS - LV MASS(C)D: 275.6 GRAMS
BH CV ECHO MEAS - LV MASS(C)DI: 119.1 GRAMS/M^2
BH CV ECHO MEAS - LV MASS(C)S: 124.6 GRAMS
BH CV ECHO MEAS - LV MASS(C)SI: 53.8 GRAMS/M^2
BH CV ECHO MEAS - LV SYSTOLIC VOL/BSA (12-30): 17.4 ML/M^2
BH CV ECHO MEAS - LVIDD: 4.9 CM
BH CV ECHO MEAS - LVIDS: 3.5 CM
BH CV ECHO MEAS - LVLD AP4: 7.4 CM
BH CV ECHO MEAS - LVLS AP4: 6.5 CM
BH CV ECHO MEAS - LVOT AREA (M): 4.9 CM^2
BH CV ECHO MEAS - LVOT AREA: 4.9 CM^2
BH CV ECHO MEAS - LVOT DIAM: 2.5 CM
BH CV ECHO MEAS - LVPWD: 1.4 CM
BH CV ECHO MEAS - LVPWS: 1.3 CM
BH CV ECHO MEAS - MV A MAX VEL: 47.1 CM/SEC
BH CV ECHO MEAS - MV E MAX VEL: 104 CM/SEC
BH CV ECHO MEAS - MV E/A: 2.2
BH CV ECHO MEAS - PA ACC TIME: 0.11 SEC
BH CV ECHO MEAS - PA PR(ACCEL): 30 MMHG
BH CV ECHO MEAS - SI(AO): 131.4 ML/M^2
BH CV ECHO MEAS - SI(CUBED): 34 ML/M^2
BH CV ECHO MEAS - SI(MOD-SP4): 30.1 ML/M^2
BH CV ECHO MEAS - SI(TEICH): 28.2 ML/M^2
BH CV ECHO MEAS - SV(AO): 304.1 ML
BH CV ECHO MEAS - SV(CUBED): 78.8 ML
BH CV ECHO MEAS - SV(MOD-SP4): 69.7 ML
BH CV ECHO MEAS - SV(TEICH): 65.3 ML
MAXIMAL PREDICTED HEART RATE: 180 BPM
STRESS TARGET HR: 153 BPM

## 2020-10-23 PROCEDURE — 93306 TTE W/DOPPLER COMPLETE: CPT | Performed by: INTERNAL MEDICINE

## 2020-10-23 PROCEDURE — 93306 TTE W/DOPPLER COMPLETE: CPT

## 2020-10-23 PROCEDURE — 93356 MYOCRD STRAIN IMG SPCKL TRCK: CPT

## 2020-10-26 NOTE — PROGRESS NOTES
His ECHO is good.  Out put normal.  Trace mitral valve regurgitation noted.  Other valves good.  We will monitor mitral valve with ECHO.  there is nothing to do/change at this time.

## 2021-03-23 ENCOUNTER — BULK ORDERING (OUTPATIENT)
Dept: CASE MANAGEMENT | Facility: OTHER | Age: 41
End: 2021-03-23

## 2021-03-23 DIAGNOSIS — Z23 IMMUNIZATION DUE: ICD-10-CM

## 2021-06-16 ENCOUNTER — OFFICE VISIT (OUTPATIENT)
Dept: FAMILY MEDICINE CLINIC | Facility: CLINIC | Age: 41
End: 2021-06-16

## 2021-06-16 VITALS
SYSTOLIC BLOOD PRESSURE: 130 MMHG | WEIGHT: 255 LBS | OXYGEN SATURATION: 98 % | BODY MASS INDEX: 35.7 KG/M2 | DIASTOLIC BLOOD PRESSURE: 80 MMHG | TEMPERATURE: 97.1 F | HEART RATE: 61 BPM | HEIGHT: 71 IN

## 2021-06-16 DIAGNOSIS — F40.10 SOCIAL ANXIETY DISORDER: Chronic | ICD-10-CM

## 2021-06-16 DIAGNOSIS — I10 ESSENTIAL HYPERTENSION: Primary | Chronic | ICD-10-CM

## 2021-06-16 DIAGNOSIS — E78.5 DYSLIPIDEMIA: Chronic | ICD-10-CM
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
## 2021-06-16 DIAGNOSIS — K21.9 GASTROESOPHAGEAL REFLUX DISEASE WITHOUT ESOPHAGITIS: Chronic | ICD-10-CM

## 2021-06-16 PROCEDURE — 99214 OFFICE O/P EST MOD 30 MIN: CPT | Performed by: NURSE PRACTITIONER

## 2021-06-16 RX ORDER — ATENOLOL 50 MG/1
50 TABLET ORAL 2 TIMES DAILY
Qty: 60 TABLET | Refills: 2 | Status: SHIPPED | OUTPATIENT
Start: 2021-06-16 | End: 2021-08-23 | Stop reason: SDUPTHER

## 2021-06-16 RX ORDER — LANSOPRAZOLE 30 MG/1
30 CAPSULE, DELAYED RELEASE ORAL DAILY
Qty: 30 CAPSULE | Refills: 2 | Status: SHIPPED | OUTPATIENT
Start: 2021-06-16 | End: 2021-08-23 | Stop reason: SDUPTHER

## 2021-06-16 RX ORDER — DOXYCYCLINE HYCLATE 100 MG/1
100 CAPSULE ORAL 2 TIMES DAILY
Qty: 20 CAPSULE | Refills: 0 | Status: SHIPPED | OUTPATIENT
Start: 2021-06-16 | End: 2021-06-18 | Stop reason: SDUPTHER

## 2021-06-16 RX ORDER — ROSUVASTATIN CALCIUM 20 MG/1
20 TABLET, COATED ORAL DAILY
Qty: 30 TABLET | Refills: 2 | Status: SHIPPED | OUTPATIENT
Start: 2021-06-16 | End: 2021-08-23 | Stop reason: SDUPTHER

## 2021-06-16 RX ORDER — CITALOPRAM 20 MG/1
20 TABLET ORAL DAILY
Qty: 30 TABLET | Refills: 2 | Status: SHIPPED | OUTPATIENT
Start: 2021-06-16 | End: 2021-08-23 | Stop reason: SDUPTHER

## 2021-06-16 NOTE — PATIENT INSTRUCTIONS
Tick Bite Information, Adult    Ticks are insects that can bite. Most ticks live in shrubs and grassy areas. They climb onto people and animals that go by. Then they bite. Some ticks carry germs that can make you sick.  How can I prevent tick bites?  Take these steps:  Use insect repellent  · Use an insect repellent that has 20% or higher of the ingredients DEET, picaridin, or [id number]. Follow the instructions on the label. Put it on:  ? Bare skin.  ? The tops of your boots.  ? Your pant legs.  ? The ends of your sleeves.  · If you use an insect repellent that has the ingredient permethrin, follow the instructions on the label. Put it on:  ? Clothing.  ? Boots.  ? Supplies or outdoor gear.  ? Tents.  When you are outside  · Wear long sleeves and long pants.  · Wear light-colored clothes.  · Tuck your pant legs into your socks.  · Stay in the middle of the trail. Do not touch the bushes.  · Avoid walking through long grass.  · Check for ticks on your clothes, hair, and skin often while you are outside. Before going inside your house, check your clothes, skin, head, neck, armpits, waist, groin, and joint areas.  When you go indoors  · Check your clothes for ticks. Dry your clothes in a dryer on high heat for 10 minutes or more. If clothes are damp, additional time may be needed.  · Wash your clothes right away if they need to be washed. Use hot water.  · Check your pets and outdoor gear.  · Shower right away.  · Check your body for ticks. Do a full body check using a mirror.  What is the right way to remove a tick?  Remove the tick from your skin as soon as possible. Do not remove the tick with your bare fingers.  · To remove a tick that is crawling on your skin:  ? Go outdoors and brush the tick off.  ? Use tape or a lint roller.  · To remove a tick that is bitin. Wash your hands.  2. If you have latex gloves, put them on.  3. Use tweezers, curved forceps, or a tick-removal tool to grasp the tick. Grasp the tick  as close to your skin and as close to the tick's head as possible.  4. Gently pull up until the tick lets go.  § Try to keep the tick's head attached to its body.  § Do not twist or jerk the tick.  § Do not squeeze or crush the tick.  Do not try to remove a tick with heat, alcohol, petroleum jelly, or fingernail polish.  What should I do after taking out a tick?  · Throw away the tick. Do not crush a tick with your fingers.  · Clean the bite area and your hands with soap and water, rubbing alcohol, or an iodine wash.  · If an antiseptic cream or ointment is available, apply a small amount to the bite area.  · Wash and disinfect any instruments that you used to remove the tick.  How should I get rid of a live tick?  To dispose of a live tick, use one of these methods:  · Place the tick in rubbing alcohol.  · Place the tick in a bag or container you can close tightly.  · Wrap the tick tightly in tape.  · Flush the tick down the toilet.  Contact a doctor if:  · You have symptoms, such as:  ? A fever or chills.  ? A red rash that makes a [place] (bull's-eye rash) in the bite area.  ? Redness and swelling where the tick bit you.  ? Headache.  ? Pain in a muscle, joint, or bone.  ? Being more tired than normal.  ? Trouble walking or moving your legs.  ? Numbness in your legs.  ? Tender and swollen lymph glands.  · A part of a tick breaks off and gets stuck in your skin.  Get help right away if:  · You cannot remove a tick.  · You cannot move (have paralysis) or feel weak.  · You are feeling worse or have new symptoms.  · You find a tick that is biting you and filled with blood. This is important if you are in an area where diseases from ticks are common.  Summary  · Ticks may carry germs that can make you sick.  · To prevent tick bites wear long sleeves, long pants, and light colors. Use insect repellent. Follow the instructions on the label.  · If the tick is biting, do not try to remove it with heat, alcohol, petroleum  jelly, or fingernail polish.  · Use tweezers, curved forceps, or a tick-removal tool to grasp the tick. Gently pull up until the tick lets go. Do not twist or jerk the tick. Do not squeeze or crush the tick.  · If you have symptoms, contact a doctor.  This information is not intended to replace advice given to you by your health care provider. Make sure you discuss any questions you have with your health care provider.  Document Revised: 12/14/2020 Document Reviewed: 12/14/2020  [employer] Patient Education © 2021 [employer]

## 2021-06-16 NOTE — PROGRESS NOTES
History of Present Illness   George Ribeiro is a 41 y.o. male who presents to the clinic today c/o a tick bite. He noticed an area which was pruritic on the bottom of his third toe two days ago and discovered a tiny tick was embedded. He had been in the yard the day prior and thinks he probably picked up the tick then. He did remove the tick two days ago. Since that time, the area has worsened with increase in erythema and warmth.   In addition, he is needing refills on his routine medications for his HTN, Dyslipidemia, Anxiety and GERD.      Insect Bite  This is a new problem. The current episode started in the past 7 days. The problem has been rapidly worsening. Pertinent negatives include no arthralgias, change in bowel habit, chest pain, chills, congestion, coughing, fatigue, fever, headaches, myalgias, nausea, neck pain, numbness, sore throat, vertigo or weakness. The symptoms are aggravated by standing. He has tried NSAIDs for the symptoms. The treatment provided mild relief.     Hypertension  The current episode started more than 1 year ago. The problem is controlled.  Pertinent negatives include no chest pain, headaches, neck pain, palpitations, peripheral edema or shortness of breath. Risk factors for coronary artery disease include dyslipidemia and male gender. Current antihypertension treatment includes beta blockers.     Dyslipidemia  The current episode started more than 1 year ago. Pertinent negatives include no chest pain, myalgias or shortness of breath. Current antihyperlipidemic treatment includes statins. Risk factors for coronary artery disease include dyslipidemia, hypertension and male sex.     Anxiety  George reports stable with Citalopram. He has no chest pain, dizziness, palpitations or shortness of breath.     GERD  He reports no chest pain, no coughing, no nausea, no sore throat, no tooth decay, no water brash or no wheezing. Stable on Prevacid     The following portions of the  "patient's history were reviewed and updated as appropriate: allergies, current medications, past family history, past medical history, past social history, past surgical history and problem list.    Current Outpatient Medications:   •  atenolol (TENORMIN) 50 MG tablet, Take 1 tablet by mouth 2 (Two) Times a Day., Disp: 60 tablet, Rfl: 2  •  citalopram (CeleXA) 20 MG tablet, Take 1 tablet by mouth Daily., Disp: 30 tablet, Rfl: 2  •  lansoprazole (PREVACID) 30 MG capsule, Take 1 capsule by mouth Daily., Disp: 30 capsule, Rfl: 2  •  rosuvastatin (Crestor) 20 MG tablet, Take 1 tablet by mouth Daily., Disp: 30 tablet, Rfl: 2  •  doxycycline (VIBRAMYCIN) 100 MG capsule, Take 1 capsule by mouth 2 (Two) Times a Day for 10 days., Disp: 20 capsule, Rfl: 0    No Known Allergies    Review of Systems   Constitutional: Negative for activity change and appetite change.   Eyes: Negative for visual disturbance.   Respiratory: Negative for shortness of breath and wheezing.    Cardiovascular: Negative for palpitations and leg swelling.   Gastrointestinal: Positive for heartburn.   Musculoskeletal: Positive for gait problem.   Skin: Positive for color change and wound.   Neurological: Negative for dizziness and light-headedness.   Psychiatric/Behavioral: Negative for confusion.     Visit Vitals  /80 (BP Location: Right arm, Patient Position: Sitting, Cuff Size: Adult)   Pulse 61   Temp 97.1 °F (36.2 °C) (Temporal)   Ht 180.3 cm (70.98\")   Wt 116 kg (255 lb)   SpO2 98%   BMI 35.58 kg/m²     Physical Exam  Vitals and nursing note reviewed.   Constitutional:       General: He is not in acute distress.     Appearance: He is well-developed.      Comments: Pleasant; Wearing appropriate face covering   HENT:      Head: Normocephalic.      Nose: Nose normal.   Eyes:      General:         Right eye: No discharge.         Left eye: No discharge.      Conjunctiva/sclera: Conjunctivae normal.   Neck:      Thyroid: No thyromegaly.      " Vascular: No JVD.   Cardiovascular:      Rate and Rhythm: Normal rate and regular rhythm.      Heart sounds: Normal heart sounds. No murmur heard.   No friction rub.   Pulmonary:      Effort: Pulmonary effort is normal. No respiratory distress.      Breath sounds: Normal breath sounds. No wheezing or rales.   Abdominal:      Tenderness: There is no abdominal tenderness. There is no guarding or rebound.   Musculoskeletal:         General: Tenderness present.      Cervical back: Neck supple.      Right lower leg: No edema.      Left lower leg: No edema.        Feet:    Feet:      Right foot:      Skin integrity: Blister (As noted), erythema and warmth present. No ulcer.   Lymphadenopathy:      Cervical: No cervical adenopathy.   Skin:     General: Skin is warm and dry.      Capillary Refill: Capillary refill takes less than 2 seconds.      Findings: Erythema present.   Neurological:      Mental Status: He is alert and oriented to person, place, and time.      Cranial Nerves: Cranial nerves are intact.      Gait: Gait abnormal.   Psychiatric:         Mood and Affect: Mood and affect normal.         Speech: Speech normal.         Behavior: Behavior is cooperative.         Thought Content: Thought content normal.         Judgment: Judgment normal.       Assessment/Plan   Diagnoses and all orders for this visit:    1. Essential hypertension (Primary)  Comments:  Continue cardiovascular risk reduction including nutrition and exercise recommendations  Orders:  -     atenolol (TENORMIN) 50 MG tablet; Take 1 tablet by mouth 2 (Two) Times a Day.  Dispense: 60 tablet; Refill: 2    2. Dyslipidemia  Comments:  Tolerating Crestor without s/e which he will continue  Orders:  -     rosuvastatin (Crestor) 20 MG tablet; Take 1 tablet by mouth Daily.  Dispense: 30 tablet; Refill: 2    3. Gastroesophageal reflux disease without esophagitis  Comments:  Stable with Prevacid  Orders:  -     lansoprazole (PREVACID) 30 MG capsule; Take 1  capsule by mouth Daily.  Dispense: 30 capsule; Refill: 2    4. Social anxiety disorder  Comments:  Continue Celexa  Orders:  -     citalopram (CeleXA) 20 MG tablet; Take 1 tablet by mouth Daily.  Dispense: 30 tablet; Refill: 2    5. Tick bite, initial encounter  Comments:  Findings and recommendations discussed with George.Treatment options reviewed.   Orders:  -     doxycycline (VIBRAMYCIN) 100 MG capsule; Take 1 capsule by mouth 2 (Two) Times a Day for 10 days.  Dispense: 20 capsule; Refill: 0    Findings and recommendations discussed with George.Treatment options reviewed. Counseled regarding supportive care measures. S/S of concern reviewed and If occur to seek further medical evaluation. He will f/u with me in two days; sooner if problems/concerns occur.            This document has been electronically signed by NANCY Bond, PEREZ-BC, REINA  June 16, 2021 17:58 EDT

## 2021-06-18 ENCOUNTER — OFFICE VISIT (OUTPATIENT)
Dept: FAMILY MEDICINE CLINIC | Facility: CLINIC | Age: 41
End: 2021-06-18

## 2021-06-18 VITALS
HEIGHT: 71 IN | BODY MASS INDEX: 35.42 KG/M2 | SYSTOLIC BLOOD PRESSURE: 120 MMHG | DIASTOLIC BLOOD PRESSURE: 80 MMHG | WEIGHT: 253 LBS | HEART RATE: 66 BPM | TEMPERATURE: 97.1 F | OXYGEN SATURATION: 98 %

## 2021-06-18 DIAGNOSIS — I10 ESSENTIAL HYPERTENSION: Chronic | ICD-10-CM

## 2021-06-18 DIAGNOSIS — E78.5 DYSLIPIDEMIA: Chronic | ICD-10-CM

## 2021-06-18 DIAGNOSIS — L08.9 SKIN INFECTION: ICD-10-CM

## 2021-06-18 DIAGNOSIS — K21.9 GASTROESOPHAGEAL REFLUX DISEASE WITHOUT ESOPHAGITIS: Chronic | ICD-10-CM
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
## 2021-06-18 DIAGNOSIS — F40.10 SOCIAL ANXIETY DISORDER: Chronic | ICD-10-CM

## 2021-06-18 PROCEDURE — 99214 OFFICE O/P EST MOD 30 MIN: CPT | Performed by: NURSE PRACTITIONER

## 2021-06-18 RX ORDER — DOXYCYCLINE HYCLATE 100 MG/1
100 CAPSULE ORAL 2 TIMES DAILY
Qty: 14 CAPSULE | Refills: 0 | Status: SHIPPED | OUTPATIENT
Start: 2021-06-18 | End: 2021-06-28

## 2021-06-18 NOTE — PATIENT INSTRUCTIONS
Cellulitis, Adult    Cellulitis is a skin infection. The infected area is often warm, red, swollen, and sore. It occurs most often in the arms and lower legs. It is very important to get treated for this condition.  What are the causes?  This condition is caused by bacteria. The bacteria enter through a break in the skin, such as a cut, burn, insect bite, open sore, or crack.  What increases the risk?  This condition is more likely to occur in people who:  · Have a weak body defense system (immune system).  · Have open cuts, burns, bites, or scrapes on the skin.  · Are older than 60 years of age.  · Have a blood sugar problem (diabetes).  · Have a long-lasting (chronic) liver disease (cirrhosis) or kidney disease.  · Are very overweight (obese).  · Have a skin problem, such as:  ? Itchy rash (eczema).  ? Slow movement of blood in the veins (venous stasis).  ? Fluid buildup below the skin (edema).  · Have been treated with high-energy rays (radiation).  · Use IV drugs.  What are the signs or symptoms?  Symptoms of this condition include:  · Skin that is:  ? Red.  ? Streaking.  ? Spotting.  ? Swollen.  ? Sore or painful when you touch it.  ? Warm.  · A fever.  · Chills.  · Blisters.  How is this diagnosed?  This condition is diagnosed based on:  · Medical history.  · Physical exam.  · Blood tests.  · Imaging tests.  How is this treated?  Treatment for this condition may include:  · Medicines to treat infections or allergies.  · Home care, such as:  ? Rest.  ? Placing cold or warm cloths (compresses) on the skin.  · Hospital care, if the condition is very bad.  Follow these instructions at home:  Medicines  · Take over-the-counter and prescription medicines only as told by your doctor.  · If you were prescribed an antibiotic medicine, take it as told by your doctor. Do not stop taking it even if you start to feel better.  General instructions    · Drink enough fluid to keep your pee (urine) pale yellow.  · Do not touch  or rub the infected area.  · Raise (elevate) the infected area above the level of your heart while you are sitting or lying down.  · Place cold or warm cloths on the area as told by your doctor.  · Keep all follow-up visits as told by your doctor. This is important.  Contact a doctor if:  · You have a fever.  · You do not start to get better after 1-2 days of treatment.  · Your bone or joint under the infected area starts to hurt after the skin has healed.  · Your infection comes back. This can happen in the same area or another area.  · You have a swollen bump in the area.  · You have new symptoms.  · You feel ill and have muscle aches and pains.  Get help right away if:  · Your symptoms get worse.  · You feel very sleepy.  · You throw up (vomit) or have watery poop (diarrhea) for a long time.  · You see red streaks coming from the area.  · Your red area gets larger.  · Your red area turns dark in color.  These symptoms may represent a serious problem that is an emergency. Do not wait to see if the symptoms will go away. Get medical help right away. Call your local emergency services (911 in the [place] Do not drive yourself to the hospital.  Summary  · Cellulitis is a skin infection. The area is often warm, red, swollen, and sore.  · This condition is treated with medicines, rest, and cold and warm cloths.  · Take all medicines only as told by your doctor.  · Tell your doctor if symptoms do not start to get better after 1-2 days of treatment.  This information is not intended to replace advice given to you by your health care provider. Make sure you discuss any questions you have with your health care provider.  Document Revised: 05/09/2019 Document Reviewed: 05/09/2019  [employer] Patient Education © 2021 [employer]

## 2021-06-20 PROBLEM — E78.5 DYSLIPIDEMIA: Status: ACTIVE | Noted: 2021-06-20

## 2021-06-20 PROBLEM — K21.9 GASTROESOPHAGEAL REFLUX DISEASE WITHOUT ESOPHAGITIS: Status: ACTIVE | Noted: 2020-03-18

## 2021-06-20 NOTE — PROGRESS NOTES
History of Present Illness   George Ribeiro is a 41 y.o. male who presents to the clinic today for f/u pertaining to a tick bite. He noticed an area which was pruritic on the bottom of his third toe five days ago and discovered a tiny tick was embedded. He had been in the yard the day prior and thinks he probably picked up the tick then. He did remove the tick . Since that time, the area had worsened with increase in erythema and warmth. He was evaluated two days ago and started on Doxycycline 100 mg bid. He reports the area is improving immensely.       Insect Bite  The current episode started in the past 7 days. The problem has been improving since starting the Doxycycline. Pertinent negatives include no arthralgias, chills,  fever, headaches, myalgias, nausea, neck pain, vertigo or weakness. The symptoms are improving.    Hypertension  The problem is controlled.  Current antihypertension treatment includes beta blockers.     Dyslipidemia   Current antihyperlipidemic treatment includes statins. Risk factors for coronary artery disease include dyslipidemia, hypertension and male sex.     Anxiety  Stable with Citalopram.    GERD  Prescribed  Prevacid which improves his symptoms.     The following portions of the patient's history were reviewed and updated as appropriate: allergies, current medications, past family history, past medical history, past social history, past surgical history and problem list.    Current Outpatient Medications:   •  atenolol (TENORMIN) 50 MG tablet, Take 1 tablet by mouth 2 (Two) Times a Day., Disp: 60 tablet, Rfl: 2  •  citalopram (CeleXA) 20 MG tablet, Take 1 tablet by mouth Daily., Disp: 30 tablet, Rfl: 2  •  doxycycline (VIBRAMYCIN) 100 MG capsule, Take 1 capsule by mouth 2 (Two) Times a Day for 10 days., Disp: 14 capsule, Rfl: 0  •  lansoprazole (PREVACID) 30 MG capsule, Take 1 capsule by mouth Daily., Disp: 30 capsule, Rfl: 2  •  rosuvastatin (Crestor) 20 MG tablet, Take 1  "tablet by mouth Daily., Disp: 30 tablet, Rfl: 2  •  mupirocin (Bactroban) 2 % ointment, Apply  topically to the appropriate area as directed 3 (Three) Times a Day., Disp: 30 g, Rfl: 0    No Known Allergies    Review of Systems   Constitutional: Negative for activity change and appetite change.   Eyes: Negative for visual disturbance.   Respiratory: Negative for shortness of breath and wheezing.    Cardiovascular: Negative for palpitations and leg swelling.   Musculoskeletal: Negative for gait problem.   Skin: Positive for color change (Improving) and wound.   Neurological: Negative for dizziness, light-headedness and headaches.   Hematological: Negative for adenopathy.   Psychiatric/Behavioral: Negative for confusion.     Visit Vitals  /80   Pulse 66   Temp 97.1 °F (36.2 °C) (Tympanic)   Ht 180.3 cm (70.98\")   Wt 115 kg (253 lb)   SpO2 98%   BMI 35.31 kg/m²     Physical Exam  Vitals and nursing note reviewed.   Constitutional:       General: He is not in acute distress.     Appearance: He is well-developed.      Comments: Pleasant; Wearing appropriate face covering   HENT:      Head: Normocephalic.      Nose: Nose normal.   Eyes:      General:         Right eye: No discharge.         Left eye: No discharge.      Conjunctiva/sclera: Conjunctivae normal.   Neck:      Thyroid: No thyromegaly.      Vascular: No JVD.   Cardiovascular:      Rate and Rhythm: Normal rate and regular rhythm.      Heart sounds: Normal heart sounds. No murmur heard.   No friction rub.   Pulmonary:      Effort: Pulmonary effort is normal. No respiratory distress.      Breath sounds: Normal breath sounds. No wheezing or rales.   Abdominal:      Tenderness: There is no abdominal tenderness. There is no guarding or rebound.   Musculoskeletal:         General: No tenderness.      Cervical back: Neck supple.      Right lower leg: No edema.      Left lower leg: No edema.        Feet:    Feet:      Right foot:      Skin integrity: Blister (As " noted), erythema and warmth present. No ulcer.   Lymphadenopathy:      Cervical: No cervical adenopathy.   Skin:     General: Skin is warm and dry.      Capillary Refill: Capillary refill takes less than 2 seconds.      Findings: Erythema (90% improvement) present.   Neurological:      Mental Status: He is alert and oriented to person, place, and time.      Cranial Nerves: Cranial nerves are intact.      Gait: Gait is intact.   Psychiatric:         Mood and Affect: Mood and affect normal.         Speech: Speech normal.         Behavior: Behavior is cooperative.         Thought Content: Thought content normal.         Judgment: Judgment normal.       Assessment/Plan   Diagnoses and all orders for this visit:    1. Tick bite, subsequent encounter (Primary)  Comments:  Findings and recommendations discussed with George.Treatment options reviewed.   Orders:  -     doxycycline (VIBRAMYCIN) 100 MG capsule; Take 1 capsule by mouth 2 (Two) Times a Day for 10 days.  Dispense: 14 capsule; Refill: 0    2. Skin infection  Comments:  Will add Bactroban  Orders:  -     mupirocin (Bactroban) 2 % ointment; Apply  topically to the appropriate area as directed 3 (Three) Times a Day.  Dispense: 30 g; Refill: 0    3. Essential hypertension  Comments:  Well controlled    4. Dyslipidemia  Comments:  Continue Crestor. Cardiovascular risk reduction modifications reinforced    5. Social anxiety disorder  Comments:  Continue Celexa    6. Gastroesophageal reflux disease without esophagitis  Comments:  Continue PPI    Findings and recommendations discussed with George. He is leaving for vacation this afternoon. Added additional Doxycycline as a precaution  S/S of concern reviewed and if occur to seek further medical evaluation. Lifestyle modifications reinforced including nutrition and activity recommendations. He will follow up with his PCP in July sooner if problems/concerns occur.         This document has been electronically signed by  Brett Sewell, NANCY, FNP-BC, CDE

## 2021-08-23 ENCOUNTER — OFFICE VISIT (OUTPATIENT)
Dept: FAMILY MEDICINE CLINIC | Facility: CLINIC | Age: 41
End: 2021-08-23

## 2021-08-23 VITALS
BODY MASS INDEX: 35.7 KG/M2 | WEIGHT: 255 LBS | TEMPERATURE: 97.3 F | SYSTOLIC BLOOD PRESSURE: 124 MMHG | HEIGHT: 71 IN | HEART RATE: 60 BPM | OXYGEN SATURATION: 97 % | DIASTOLIC BLOOD PRESSURE: 80 MMHG
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
## 2021-08-23 DIAGNOSIS — E55.9 VITAMIN D DEFICIENCY: ICD-10-CM

## 2021-08-23 DIAGNOSIS — K21.9 GASTROESOPHAGEAL REFLUX DISEASE WITHOUT ESOPHAGITIS: ICD-10-CM

## 2021-08-23 DIAGNOSIS — F40.10 SOCIAL ANXIETY DISORDER: ICD-10-CM

## 2021-08-23 DIAGNOSIS — S90.869S: ICD-10-CM

## 2021-08-23 DIAGNOSIS — I10 ESSENTIAL HYPERTENSION: Primary | ICD-10-CM

## 2021-08-23 DIAGNOSIS — E78.5 DYSLIPIDEMIA: ICD-10-CM

## 2021-08-23 PROCEDURE — 80061 LIPID PANEL: CPT | Performed by: NURSE PRACTITIONER

## 2021-08-23 PROCEDURE — 84439 ASSAY OF FREE THYROXINE: CPT | Performed by: NURSE PRACTITIONER

## 2021-08-23 PROCEDURE — 85025 COMPLETE CBC W/AUTO DIFF WBC: CPT | Performed by: NURSE PRACTITIONER

## 2021-08-23 PROCEDURE — 86618 LYME DISEASE ANTIBODY: CPT | Performed by: NURSE PRACTITIONER

## 2021-08-23 PROCEDURE — 84443 ASSAY THYROID STIM HORMONE: CPT | Performed by: NURSE PRACTITIONER

## 2021-08-23 PROCEDURE — 83036 HEMOGLOBIN GLYCOSYLATED A1C: CPT | Performed by: NURSE PRACTITIONER

## 2021-08-23 PROCEDURE — 80053 COMPREHEN METABOLIC PANEL: CPT | Performed by: NURSE PRACTITIONER

## 2021-08-23 PROCEDURE — 99214 OFFICE O/P EST MOD 30 MIN: CPT | Performed by: NURSE PRACTITIONER

## 2021-08-23 PROCEDURE — 82306 VITAMIN D 25 HYDROXY: CPT | Performed by: NURSE PRACTITIONER

## 2021-08-23 PROCEDURE — 86803 HEPATITIS C AB TEST: CPT | Performed by: NURSE PRACTITIONER

## 2021-08-23 PROCEDURE — 86757 RICKETTSIA ANTIBODY: CPT | Performed by: NURSE PRACTITIONER

## 2021-08-23 RX ORDER — LANSOPRAZOLE 30 MG/1
30 CAPSULE, DELAYED RELEASE ORAL DAILY
Qty: 30 CAPSULE | Refills: 2 | Status: SHIPPED | OUTPATIENT
Start: 2021-08-23 | End: 2021-09-20

## 2021-08-23 RX ORDER — CITALOPRAM 20 MG/1
20 TABLET ORAL DAILY
Qty: 30 TABLET | Refills: 2 | Status: SHIPPED | OUTPATIENT
Start: 2021-08-23 | End: 2021-08-23

## 2021-08-23 RX ORDER — ROSUVASTATIN CALCIUM 20 MG/1
20 TABLET, COATED ORAL DAILY
Qty: 30 TABLET | Refills: 2 | Status: SHIPPED | OUTPATIENT
Start: 2021-08-23 | End: 2021-11-18 | Stop reason: SDUPTHER

## 2021-08-23 RX ORDER — ATENOLOL 50 MG/1
50 TABLET ORAL 2 TIMES DAILY
Qty: 60 TABLET | Refills: 2 | Status: SHIPPED | OUTPATIENT
Start: 2021-08-23 | End: 2021-11-18 | Stop reason: SDUPTHER

## 2021-08-23 RX ORDER — CITALOPRAM 40 MG/1
40 TABLET ORAL DAILY
Qty: 30 TABLET | Refills: 5 | Status: SHIPPED | OUTPATIENT
Start: 2021-08-23 | End: 2022-01-05
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
## 2021-08-24 LAB
25(OH)D3 SERPL-MCNC: 26.2 NG/ML (ref 30–100)
ALBUMIN SERPL-MCNC: 5 G/DL (ref 3.5–5.2)
ALBUMIN/GLOB SERPL: 1.9 G/DL
ALP SERPL-CCNC: 69 U/L (ref 39–117)
ALT SERPL W P-5'-P-CCNC: 26 U/L (ref 1–41)
ANION GAP SERPL CALCULATED.3IONS-SCNC: 13.6 MMOL/L (ref 5–15)
AST SERPL-CCNC: 28 U/L (ref 1–40)
BASOPHILS # BLD AUTO: 0.06 10*3/MM3 (ref 0–0.2)
BASOPHILS NFR BLD AUTO: 0.9 % (ref 0–1.5)
BILIRUB SERPL-MCNC: 0.6 MG/DL (ref 0–1.2)
BUN SERPL-MCNC: 14 MG/DL (ref 6–20)
BUN/CREAT SERPL: 21.5 (ref 7–25)
CALCIUM SPEC-SCNC: 9.9 MG/DL (ref 8.6–10.5)
CHLORIDE SERPL-SCNC: 102 MMOL/L (ref 98–107)
CHOLEST SERPL-MCNC: 219 MG/DL (ref 0–200)
CO2 SERPL-SCNC: 22.4 MMOL/L (ref 22–29)
CREAT SERPL-MCNC: 0.65 MG/DL (ref 0.76–1.27)
DEPRECATED RDW RBC AUTO: 38.6 FL (ref 37–54)
EOSINOPHIL # BLD AUTO: 0.08 10*3/MM3 (ref 0–0.4)
EOSINOPHIL NFR BLD AUTO: 1.2 % (ref 0.3–6.2)
ERYTHROCYTE [DISTWIDTH] IN BLOOD BY AUTOMATED COUNT: 12.6 % (ref 12.3–15.4)
GFR SERPL CREATININE-BSD FRML MDRD: 135 ML/MIN/1.73
GLOBULIN UR ELPH-MCNC: 2.6 GM/DL
GLUCOSE SERPL-MCNC: 85 MG/DL (ref 65–99)
HBA1C MFR BLD: 5.3 % (ref 4.8–5.6)
HCT VFR BLD AUTO: 45.8 % (ref 37.5–51)
HCV AB SER DONR QL: NORMAL
HDLC SERPL-MCNC: 41 MG/DL (ref 40–60)
HGB BLD-MCNC: 15.6 G/DL (ref 13–17.7)
IMM GRANULOCYTES # BLD AUTO: 0.02 10*3/MM3 (ref 0–0.05)
IMM GRANULOCYTES NFR BLD AUTO: 0.3 % (ref 0–0.5)
LDLC SERPL CALC-MCNC: 149 MG/DL (ref 0–100)
LDLC/HDLC SERPL: 3.57 {RATIO}
LYMPHOCYTES # BLD AUTO: 2.49 10*3/MM3 (ref 0.7–3.1)
LYMPHOCYTES NFR BLD AUTO: 37 % (ref 19.6–45.3)
MCH RBC QN AUTO: 29.4 PG (ref 26.6–33)
MCHC RBC AUTO-ENTMCNC: 34.1 G/DL (ref 31.5–35.7)
MCV RBC AUTO: 86.3 FL (ref 79–97)
MONOCYTES # BLD AUTO: 0.54 10*3/MM3 (ref 0.1–0.9)
MONOCYTES NFR BLD AUTO: 8 % (ref 5–12)
NEUTROPHILS NFR BLD AUTO: 3.54 10*3/MM3 (ref 1.7–7)
NEUTROPHILS NFR BLD AUTO: 52.6 % (ref 42.7–76)
NRBC BLD AUTO-RTO: 0 /100 WBC (ref 0–0.2)
PLATELET # BLD AUTO: 316 10*3/MM3 (ref 140–450)
PMV BLD AUTO: 10.3 FL (ref 6–12)
POTASSIUM SERPL-SCNC: 4.1 MMOL/L (ref 3.5–5.2)
PROT SERPL-MCNC: 7.6 G/DL (ref 6–8.5)
RBC # BLD AUTO: 5.31 10*6/MM3 (ref 4.14–5.8)
SODIUM SERPL-SCNC: 138 MMOL/L (ref 136–145)
T4 FREE SERPL-MCNC: 1.06 NG/DL (ref 0.93–1.7)
TRIGL SERPL-MCNC: 158 MG/DL (ref 0–150)
TSH SERPL DL<=0.05 MIU/L-ACNC: 5.3 UIU/ML (ref 0.27–4.2)
VLDLC SERPL-MCNC: 29 MG/DL (ref 5–40)
WBC # BLD AUTO: 6.73 10*3/MM3 (ref 3.4–10.8)

## 2021-08-25 LAB — B BURGDOR IGG+IGM SER-ACNC: <0.91 ISR (ref 0–0.9)

## 2021-08-28 LAB
R RICKETTSI IGG SER QL IA: NEGATIVE
R RICKETTSI IGM SER-ACNC: 0.45 INDEX (ref 0–0.89)

## 2021-08-30 NOTE — PROGRESS NOTES
Labs are good other than a low  vit d level and his thyroid level is mildly elevated.  We will check it again in about 6 weeks before considering medication.  HIs cholesterol has improved but is still more than normal.  Continue to work on diet.  Limit fried and fatty food.  Add some grilled or baked fish or seafood if he likes it.

## 2021-09-20 ENCOUNTER — OFFICE VISIT (OUTPATIENT)
Dept: GASTROENTEROLOGY | Facility: CLINIC | Age: 41
End: 2021-09-20

## 2021-09-20 VITALS
HEIGHT: 71 IN | WEIGHT: 259 LBS | DIASTOLIC BLOOD PRESSURE: 97 MMHG | OXYGEN SATURATION: 95 % | HEART RATE: 60 BPM | SYSTOLIC BLOOD PRESSURE: 145 MMHG | BODY MASS INDEX: 36.26 KG/M2

## 2021-09-20 DIAGNOSIS — K21.9 GASTROESOPHAGEAL REFLUX DISEASE WITHOUT ESOPHAGITIS: ICD-10-CM

## 2021-09-20 DIAGNOSIS — R14.0 BLOATING: Primary | ICD-10-CM

## 2021-09-20 PROCEDURE — 99204 OFFICE O/P NEW MOD 45 MIN: CPT | Performed by: PHYSICIAN ASSISTANT

## 2021-09-20 RX ORDER — SUCRALFATE 1 G/1
1 TABLET ORAL 4 TIMES DAILY
Qty: 120 TABLET | Refills: 0 | Status: SHIPPED | OUTPATIENT
Start: 2021-09-20 | End: 2021-09-24 | Stop reason: SDUPTHER

## 2021-09-20 RX ORDER — DEXLANSOPRAZOLE 60 MG/1
60 CAPSULE, DELAYED RELEASE ORAL
Qty: 90 CAPSULE | Refills: 3 | Status: SHIPPED | OUTPATIENT
Start: 2021-09-20 | End: 2022-07-22

## 2021-09-20 NOTE — PROGRESS NOTES
Chief Complaint   Patient presents with   • Heartburn   • Bloated       Georeg Ribeiro is a 41 y.o. male who presents to the office today for evaluation of Heartburn and Bloated  .    HPI  Patient presents to the clinic today for heartburn and abdominal bloating.  He states that he has been suffering with GERD symptoms for quite some time now however they had seem to be well controlled on his Prevacid 30 mg once daily.  Over the last few months he has been experiencing some nausea and abdominal pain that is linked to exertion.  He states he has some tightness in his chest and difficulty breathing at times, in the past he is also had chest pain -she has been checked out by cardiologist and had a stress test and echo done that came back completely normal.  He states these episodes are only worsening.  He is experiencing pain in the epigastric area along with a swelling.  He states it is tender to touch at times.  He has had a prior EGD/colonoscopy done in [place] roughly 5 years ago.  He states that the only thing he was told on his EGD was he had some redness and irritation to the stomach lining.  He was started on Prilosec 40 mg at that time which made no difference in the GERD.  His PCP started him on Prevacid which controls the burning however he still does belch acid frequently.  Review of Systems   Constitutional: Negative.    HENT: Negative for sore throat and trouble swallowing.    Eyes: Negative.    Respiratory: Positive for chest tightness.    Cardiovascular: Positive for chest pain.   Gastrointestinal: Positive for abdominal distention, abdominal pain, anal bleeding, blood in stool and diarrhea. Negative for constipation, nausea, rectal pain and vomiting.   Endocrine: Negative.    Genitourinary: Negative for difficulty urinating.   Musculoskeletal: Positive for back pain. Negative for neck pain.   Skin: Negative.    Allergic/Immunologic: Positive for food allergies. Negative for environmental  "allergies.   Neurological: Positive for headaches. Negative for dizziness.   Hematological: Does not bruise/bleed easily.   Psychiatric/Behavioral: Negative for agitation and sleep disturbance. The patient is not nervous/anxious.        ACTIVE PROBLEMS:   Specialty Problems        Gastroenterology Problems    Gastroesophageal reflux disease without esophagitis              PAST MEDICAL HISTORY:  Past Medical History:   Diagnosis Date   • GERD (gastroesophageal reflux disease)        SURGICAL HISTORY:  Past Surgical History:   Procedure Laterality Date   • COLONOSCOPY     • UPPER GASTROINTESTINAL ENDOSCOPY         FAMILY HISTORY:  History reviewed. No pertinent family history.    SOCIAL HISTORY:  Social History     Tobacco Use   • Smoking status: Never Smoker   • Smokeless tobacco: Never Used   Substance Use Topics   • Alcohol use: Never       CURRENT MEDICATION:    Current Outpatient Medications:   •  atenolol (TENORMIN) 50 MG tablet, Take 1 tablet by mouth 2 (Two) Times a Day., Disp: 60 tablet, Rfl: 2  •  citalopram (CeleXA) 40 MG tablet, Take 1 tablet by mouth Daily., Disp: 30 tablet, Rfl: 5  •  rosuvastatin (Crestor) 20 MG tablet, Take 1 tablet by mouth Daily., Disp: 30 tablet, Rfl: 2  •  dexlansoprazole (DEXILANT) 60 MG capsule, Take 1 capsule by mouth Every Morning Before Breakfast. Take one capsule 30 minutes prior to eating breakfast, Disp: 90 capsule, Rfl: 3  •  sucralfate (Carafate) 1 g tablet, Take 1 tablet by mouth 4 (Four) Times a Day., Disp: 120 tablet, Rfl: 0    ALLERGIES:  Patient has no known allergies.    VISIT VITALS:  /97   Pulse 60   Ht 180.3 cm (71\")   Wt 117 kg (259 lb)   SpO2 95%   BMI 36.12 kg/m²   Physical Exam  Constitutional:       General: He is not in acute distress.     Appearance: Normal appearance. He is well-developed.   HENT:      Head: Normocephalic and atraumatic.   Eyes:      Pupils: Pupils are equal, round, and reactive to light.   Cardiovascular:      Rate and " Rhythm: Normal rate and regular rhythm.      Heart sounds: Normal heart sounds.   Pulmonary:      Effort: Pulmonary effort is normal. No respiratory distress.      Breath sounds: Normal breath sounds. No wheezing, rhonchi or rales.   Abdominal:      General: Abdomen is flat. Bowel sounds are normal. There is no distension.      Palpations: Abdomen is soft. There is no mass.      Tenderness: There is no abdominal tenderness. There is no guarding or rebound.      Hernia: No hernia is present.   Musculoskeletal:         General: No swelling. Normal range of motion.      Cervical back: Normal range of motion and neck supple.      Right lower leg: No edema.      Left lower leg: No edema.   Skin:     General: Skin is warm and dry.   Neurological:      Mental Status: He is alert and oriented to person, place, and time.   Psychiatric:         Attention and Perception: Attention normal.         Mood and Affect: Mood normal.         Speech: Speech normal.         Behavior: Behavior normal. Behavior is cooperative.         Thought Content: Thought content normal.         Assessment/Plan   I will go ahead and get the patient started on Dexilant 60 mg once daily-she has previously failed both Prilosec 40 mg and Prevacid 30 mg twice daily.  I will also start him on Carafate 1 g 4 times daily for possible gastritis/ulcers.  He will also undergo H. pylori breath test due to abdominal bloating.   Diagnosis Plan   1. Bloating  H. Pylori Breath Test - Breath, Lung   2. Gastroesophageal reflux disease without esophagitis  sucralfate (Carafate) 1 g tablet    dexlansoprazole (DEXILANT) 60 MG capsule    H. Pylori Breath Test - Breath, Lung       Return in about 4 weeks (around 10/18/2021).                   This document has been electronically signed by Ck Jackson PA-C  September 20, 2021 10:24 EDT    Part of this note may be an electronic transcription/translation of spoken language to printed text using the Dragon Dictation  System.

## 2021-09-24 ENCOUNTER — TELEPHONE (OUTPATIENT)
Dept: GASTROENTEROLOGY | Facility: CLINIC | Age: 41
End: 2021-09-24

## 2021-09-24 DIAGNOSIS — K21.9 GASTROESOPHAGEAL REFLUX DISEASE WITHOUT ESOPHAGITIS: Primary | ICD-10-CM

## 2021-09-24 RX ORDER — SUCRALFATE 1 G/1
1 TABLET ORAL 4 TIMES DAILY
Qty: 360 TABLET | Refills: 0 | Status: SHIPPED | OUTPATIENT
Start: 2021-09-24 | End: 2021-12-23

## 2021-11-08 ENCOUNTER — TELEPHONE (OUTPATIENT)
Dept: FAMILY MEDICINE CLINIC | Facility: CLINIC | Age: 41
End: 2021-11-08

## 2021-11-08 RX ORDER — DEXAMETHASONE 4 MG/1
4 TABLET ORAL 2 TIMES DAILY WITH MEALS
Qty: 20 TABLET | Refills: 0 | Status: SHIPPED | OUTPATIENT
Start: 2021-11-08 | End: 2021-11-18

## 2021-11-08 RX ORDER — AZITHROMYCIN 250 MG/1
TABLET, FILM COATED ORAL
Qty: 6 TABLET | Refills: 0 | Status: SHIPPED | OUTPATIENT
Start: 2021-11-08 | End: 2021-11-18

## 2021-11-08 NOTE — TELEPHONE ENCOUNTER
Let patient know that Ashley sent him a Z-josesito and a steroid in to his pharmacy. Patient is in understanding.  ----- Message from NANCY Jessica sent at 11/8/2021  5:41 PM EST -----  Will you let him know I have sent meds?  ----- Message -----  From: Doris Figueroa MA  Sent: 11/8/2021   2:21 PM EST  To: NANCY Jessica    Patient calling, tested positive for COVID. Would like medication sent in, states that it feels like he has a bad sinus infection.     Please advise.

## 2021-11-18 ENCOUNTER — OFFICE VISIT (OUTPATIENT)
Dept: FAMILY MEDICINE CLINIC | Facility: CLINIC | Age: 41
End: 2021-11-18

## 2021-11-18 VITALS
WEIGHT: 259 LBS | BODY MASS INDEX: 36.26 KG/M2 | SYSTOLIC BLOOD PRESSURE: 126 MMHG | HEART RATE: 74 BPM | DIASTOLIC BLOOD PRESSURE: 84 MMHG | TEMPERATURE: 97.8 F | HEIGHT: 71 IN | OXYGEN SATURATION: 97 %

## 2021-11-18 DIAGNOSIS — J02.9 SORE THROAT: Primary | ICD-10-CM

## 2021-11-18 DIAGNOSIS — I10 ESSENTIAL HYPERTENSION: ICD-10-CM

## 2021-11-18 DIAGNOSIS — E78.5 DYSLIPIDEMIA: ICD-10-CM

## 2021-11-18 LAB
EXPIRATION DATE: NORMAL
EXPIRATION DATE: NORMAL
FLUAV AG NPH QL: NEGATIVE
FLUBV AG NPH QL: NEGATIVE
INTERNAL CONTROL: NORMAL
INTERNAL CONTROL: NORMAL
Lab: NORMAL
Lab: NORMAL
S PYO AG THROAT QL: NEGATIVE

## 2021-11-18 PROCEDURE — 87804 INFLUENZA ASSAY W/OPTIC: CPT | Performed by: NURSE PRACTITIONER

## 2021-11-18 PROCEDURE — 96372 THER/PROPH/DIAG INJ SC/IM: CPT | Performed by: NURSE PRACTITIONER

## 2021-11-18 PROCEDURE — 87880 STREP A ASSAY W/OPTIC: CPT | Performed by: NURSE PRACTITIONER

## 2021-11-18 PROCEDURE — 99214 OFFICE O/P EST MOD 30 MIN: CPT | Performed by: NURSE PRACTITIONER

## 2021-11-18 RX ORDER — ATENOLOL 50 MG/1
50 TABLET ORAL 2 TIMES DAILY
Qty: 60 TABLET | Refills: 2 | Status: SHIPPED | OUTPATIENT
Start: 2021-11-18 | End: 2021-12-07

## 2021-11-18 RX ORDER — ROSUVASTATIN CALCIUM 20 MG/1
20 TABLET, COATED ORAL DAILY
Qty: 30 TABLET | Refills: 2 | Status: SHIPPED | OUTPATIENT
Start: 2021-11-18 | End: 2021-12-07

## 2021-11-18 RX ORDER — CEFTRIAXONE 1 G/1
1 INJECTION, POWDER, FOR SOLUTION INTRAMUSCULAR; INTRAVENOUS ONCE
Status: COMPLETED | OUTPATIENT
Start: 2021-11-18 | End: 2021-11-18

## 2021-11-18 RX ADMIN — CEFTRIAXONE 1 G: 1 INJECTION, POWDER, FOR SOLUTION INTRAMUSCULAR; INTRAVENOUS at 17:16

## 2021-11-18 NOTE — PROGRESS NOTES
Injection  Injection performed in right dorsogluteal by Clarissa Fu MA. Patient tolerated the procedure well without complications.  11/18/21   Clarissa Fu MA

## 2021-11-18 NOTE — PROGRESS NOTES
Subjective   George Ribeiro is a 41 y.o. male.     Chief Complaint   Patient presents with   • Sore Throat       History of Present Illness     Sore throat-has worsened since his covid diagnosis.  He reports he tested positive for covid last Monday but had been symptomatic for 2 days prior to testing.  He reports he has some PND.  He reports that he had some voice hoarseness this morning.  No appetite changes.  He reports body aches continue.  He feels more jittery overall.  He reports he is not resting at night due to the generalized uncomfortable sensation. No fever.   He is not having cough.  Generalized malaise.     Hyperlipidemia-patient is currently taking Crestor 20 mg without any negative side effects.  He is due for refill today.  Hypertension-chronic and ongoing.  Patient is currently taking atenolol 50 mg twice daily.  No negative side effects.  He denies chest pain or palpitations.  No shortness of breath is reported    The following portions of the patient's history were reviewed and updated as appropriate: CC, ROS, allergies, current medications, past family history, past medical history, past social history, past surgical history and problem list.      Review of Systems   Constitutional: Positive for fatigue. Negative for activity change, appetite change and fever.   HENT: Positive for sore throat. Negative for congestion, ear pain, facial swelling, nosebleeds, rhinorrhea, sinus pressure and trouble swallowing.    Eyes: Negative for blurred vision, double vision and redness.   Respiratory: Negative for cough, chest tightness, shortness of breath and wheezing.    Cardiovascular: Negative for chest pain, palpitations and leg swelling.   Gastrointestinal: Negative for abdominal pain, blood in stool, constipation, diarrhea, nausea and vomiting.   Endocrine: Negative.         Sensation of hot flashes   Genitourinary: Negative for dysuria, flank pain, frequency, hematuria and urgency.  "  Musculoskeletal: Positive for arthralgias and myalgias.   Skin: Negative.    Allergic/Immunologic: Negative.    Neurological: Positive for headache (generalized since having covid). Negative for dizziness, weakness and light-headedness.   Hematological: Negative.    Psychiatric/Behavioral: Positive for stress. Negative for self-injury, sleep disturbance and suicidal ideas. The patient is nervous/anxious.    All other systems reviewed and are negative.      Objective     /84   Pulse 74   Temp 97.8 °F (36.6 °C)   Ht 180.3 cm (70.98\")   Wt 117 kg (259 lb)   SpO2 97%   BMI 36.14 kg/m²     Physical Exam  Vitals reviewed.   Constitutional:       General: He is not in acute distress.     Appearance: He is well-developed. He is obese. He is not diaphoretic.   HENT:      Head: Normocephalic and atraumatic.      Jaw: No tenderness.      Comments: Oropharynx not examined.  Patient is presently wearing a face covering/mask due to COVID-19 pandemic.     Right Ear: Hearing, ear canal and external ear normal. A middle ear effusion is present.      Left Ear: Hearing, ear canal and external ear normal. A middle ear effusion is present.      Nose: Rhinorrhea present.      Right Sinus: Maxillary sinus tenderness and frontal sinus tenderness present.      Left Sinus: Maxillary sinus tenderness and frontal sinus tenderness present.      Mouth/Throat:      Pharynx: Posterior oropharyngeal erythema present.   Eyes:      General: Lids are normal. No scleral icterus.     Extraocular Movements:      Right eye: Normal extraocular motion and no nystagmus.      Left eye: Normal extraocular motion and no nystagmus.      Conjunctiva/sclera: Conjunctivae normal.      Pupils: Pupils are equal, round, and reactive to light.   Neck:      Thyroid: No thyromegaly or thyroid tenderness.      Vascular: No carotid bruit or JVD.      Trachea: No tracheal tenderness.   Cardiovascular:      Rate and Rhythm: Normal rate and regular rhythm.      " Pulses:           Dorsalis pedis pulses are 2+ on the right side and 2+ on the left side.        Posterior tibial pulses are 2+ on the right side and 2+ on the left side.      Heart sounds: Normal heart sounds, S1 normal and S2 normal. No murmur heard.      Pulmonary:      Effort: Pulmonary effort is normal. No accessory muscle usage, prolonged expiration or respiratory distress.      Breath sounds: Normal breath sounds.   Chest:      Chest wall: No tenderness.   Abdominal:      General: Bowel sounds are normal.      Palpations: Abdomen is soft. There is no mass.      Tenderness: There is no abdominal tenderness.   Musculoskeletal:         General: No tenderness.      Cervical back: Normal range of motion and neck supple.      Right lower leg: No edema.      Left lower leg: No edema.      Comments: No muscular atrophy or flaccidity.   Lymphadenopathy:      Head:      Right side of head: No submental or submandibular adenopathy.      Left side of head: No submental or submandibular adenopathy.      Cervical: Cervical adenopathy present.      Right cervical: Superficial cervical adenopathy present.      Left cervical: Superficial cervical adenopathy present.   Skin:     General: Skin is warm and dry.      Capillary Refill: Capillary refill takes less than 2 seconds.      Coloration: Skin is not jaundiced or pale.      Findings: No erythema.      Nails: There is no clubbing.   Neurological:      Mental Status: He is alert and oriented to person, place, and time.      Cranial Nerves: No cranial nerve deficit or facial asymmetry.      Sensory: No sensory deficit.      Motor: No tremor, atrophy or abnormal muscle tone.      Coordination: Coordination normal.      Deep Tendon Reflexes: Reflexes are normal and symmetric.   Psychiatric:         Attention and Perception: He is attentive.         Mood and Affect: Mood normal.         Speech: Speech normal.         Behavior: Behavior normal. Behavior is cooperative.          Thought Content: Thought content normal.         Judgment: Judgment normal.       Assessment/Plan     Diagnoses and all orders for this visit:    1. Sore throat (Primary)  -     POCT rapid strep A  -     POCT Influenza A/B  -     cefTRIAXone (ROCEPHIN) injection 1 g    2. Essential hypertension  Comments:  Continue cardiovascular risk reduction including nutrition and exercise recommendations  Orders:  -     atenolol (TENORMIN) 50 MG tablet; Take 1 tablet by mouth 2 (Two) Times a Day.  Dispense: 60 tablet; Refill: 2    3. Dyslipidemia  Comments:  Tolerating Crestor without s/e which he will continue  Orders:  -     rosuvastatin (Crestor) 20 MG tablet; Take 1 tablet by mouth Daily.  Dispense: 30 tablet; Refill: 2         Patient's Body mass index is 36.14 kg/m². indicating that he is obese (BMI >30). Obesity-related health conditions include the following: hypertension and dyslipidemias. Obesity is unchanged. BMI is is above average. We discussed portion control and increasing exercise..     Understands disease processes and need for medications.  Understands reasons for urgent and emergent care.  Patient (& family) verbalized agreement for treatment plan.   Emotional support and active listening provided.  Patient provided time to verbalize feelings.    Instructed to complete all of antibiotics for acute illness.  Increase PO fluids, avoid/limit caffeine.  Do not save any of the meds for later use.  Rest PRN    RTC PRN 3-5 days for worsening or non resolving symptoms            This document has been electronically signed by:  NANCY Jessica, FNP-C    Dragon disclaimer:  Part of this note may be an electronic transcription/translation of spoken language to printed text using the Dragon Dictation System.

## 2021-12-07 ENCOUNTER — OFFICE VISIT (OUTPATIENT)
Dept: FAMILY MEDICINE CLINIC | Facility: CLINIC | Age: 41
End: 2021-12-07

## 2021-12-07 VITALS
BODY MASS INDEX: 35.28 KG/M2 | SYSTOLIC BLOOD PRESSURE: 132 MMHG | DIASTOLIC BLOOD PRESSURE: 84 MMHG | TEMPERATURE: 97.9 F | WEIGHT: 252 LBS | HEART RATE: 66 BPM | OXYGEN SATURATION: 96 % | HEIGHT: 71 IN

## 2021-12-07 DIAGNOSIS — Z09 ENCOUNTER FOR EXAMINATION FOLLOWING TREATMENT AT HOSPITAL: Primary | ICD-10-CM

## 2021-12-07 DIAGNOSIS — K21.9 GASTROESOPHAGEAL REFLUX DISEASE WITHOUT ESOPHAGITIS: ICD-10-CM

## 2021-12-07 DIAGNOSIS — F40.10 SOCIAL ANXIETY DISORDER: ICD-10-CM

## 2021-12-07 DIAGNOSIS — I21.02 ST ELEVATION MYOCARDIAL INFARCTION INVOLVING LEFT ANTERIOR DESCENDING (LAD) CORONARY ARTERY (HCC): ICD-10-CM

## 2021-12-07 DIAGNOSIS — I50.21 ACUTE SYSTOLIC HEART FAILURE (HCC): ICD-10-CM

## 2021-12-07 PROBLEM — I21.9 ACUTE MYOCARDIAL INFARCTION: Status: ACTIVE | Noted: 2021-12-07

## 2021-12-07 PROCEDURE — 99214 OFFICE O/P EST MOD 30 MIN: CPT | Performed by: NURSE PRACTITIONER

## 2021-12-07 RX ORDER — METOPROLOL SUCCINATE 25 MG/1
25 TABLET, EXTENDED RELEASE ORAL DAILY
COMMUNITY
Start: 2021-11-30

## 2021-12-07 RX ORDER — SACUBITRIL AND VALSARTAN 24; 26 MG/1; MG/1
1 TABLET, FILM COATED ORAL 2 TIMES DAILY
COMMUNITY
Start: 2021-11-30

## 2021-12-07 RX ORDER — CLOPIDOGREL BISULFATE 75 MG/1
75 TABLET ORAL DAILY
COMMUNITY
Start: 2021-11-30

## 2021-12-07 RX ORDER — COLCHICINE 0.6 MG/1
0.6 TABLET ORAL DAILY
COMMUNITY
Start: 2021-11-30 | End: 2022-07-22

## 2021-12-07 RX ORDER — AMIODARONE HYDROCHLORIDE 200 MG/1
200 TABLET ORAL 2 TIMES DAILY
COMMUNITY
Start: 2021-11-30 | End: 2022-07-22

## 2021-12-07 RX ORDER — CETIRIZINE HYDROCHLORIDE 10 MG/1
10 TABLET ORAL
COMMUNITY
Start: 2021-11-26

## 2021-12-07 RX ORDER — ROSUVASTATIN CALCIUM 40 MG/1
40 TABLET, COATED ORAL DAILY
COMMUNITY

## 2021-12-07 RX ORDER — NITROGLYCERIN 0.4 MG/1
0.4 TABLET SUBLINGUAL
COMMUNITY
Start: 2021-11-30

## 2021-12-07 RX ORDER — ASPIRIN 81 MG/1
81 TABLET, CHEWABLE ORAL
COMMUNITY
Start: 2021-11-26 | End: 2022-01-11

## 2021-12-07 RX ORDER — FUROSEMIDE 20 MG/1
20 TABLET ORAL DAILY
COMMUNITY
Start: 2021-11-30 | End: 2022-07-22

## 2021-12-07 NOTE — PROGRESS NOTES
"Subjective   George Ribeiro is a 41 y.o. male.     Chief Complaint   Patient presents with   • Hospital Follow Up Visit       History of Present Illness     Hospital follow-up-patient is here for hospital follow-up at the [facility].  He presented to [facility] with CP and was transferred to [facility] via helicopter with a STEMI in the LAD.  He required 3 stents.  He does have an appt on Friday.   His Cardiac cath was in his right wrist.  He is currently wearing a life vest.  He has not had any alarms.  He has been discharged on pacerone, Eliquis, Plavix, colchine for a numbness on his left leg that is of new onset since his MI, he is also on Entresto.  Atenolol was stopped Metoprolol was added.    He reports he was working outside when he began to have chest pain and pressure as well as a \"sick feeling\".  He sought emergent care immediately.  While at the hospital emergency department at [facility] he did have an episode of VF cardiac arrest and required defibrillation  He reports that he has not noted any chest pain or discomfort since being home.  He reports he also feels that he is regaining strength.  He is trying to walk some daily inside his home.  He is avoiding overexertion.  He is taking medications as directed and does not have any negative side effects.  He does report numbness on his left lateral thigh that has been present since he was in the hospital.  He was given a short course of colchicine to take for those symptoms.    Anxiety-has been mildly increased due to his recent illness.  He is taking Celexa without any difficulty.  He does feel that he was doing well before his acute illness.  He does not wish to consider any medication changes at this time.  GERD-chronic and ongoing.  Patient is currently taking Dexilant.  He has been under the care of GI.  He does not have any concerns today.    The following portions of the patient's history were reviewed and updated as appropriate: CC, ROS, allergies, " "current medications, past family history, past medical history, past social history, past surgical history and problem list.      Review of Systems   Constitutional: Negative for activity change, appetite change, fatigue and fever.        He reports he can tell he is feeling better.    HENT: Negative for congestion, ear pain, facial swelling, nosebleeds, rhinorrhea, sinus pressure, sore throat and trouble swallowing.    Eyes: Negative for blurred vision, double vision and redness.   Respiratory: Negative for cough, chest tightness, shortness of breath and wheezing.    Cardiovascular: Negative for chest pain, palpitations and leg swelling.   Gastrointestinal: Negative for abdominal pain, blood in stool, constipation, diarrhea, vomiting and indigestion.   Endocrine: Negative.    Genitourinary: Negative for dysuria, flank pain, frequency, hematuria and urgency.   Skin: Negative.    Allergic/Immunologic: Negative.    Neurological: Positive for numbness (single area on left leg on lateral thigh) and headache. Negative for dizziness, weakness and light-headedness.   Hematological: Negative.    Psychiatric/Behavioral: Negative for self-injury, sleep disturbance and suicidal ideas. The patient is not nervous/anxious.    All other systems reviewed and are negative.      Objective     /84   Pulse 66   Temp 97.9 °F (36.6 °C)   Ht 180.3 cm (70.98\")   Wt 114 kg (252 lb)   SpO2 96%   BMI 35.16 kg/m²     Physical Exam  Vitals reviewed.   Constitutional:       General: He is not in acute distress.     Appearance: He is well-developed. He is not diaphoretic.   HENT:      Head: Normocephalic and atraumatic.      Jaw: No tenderness.      Comments: Oropharynx not examined.  Patient is presently wearing a face covering/mask due to COVID-19 pandemic.     Right Ear: Hearing, tympanic membrane, ear canal and external ear normal.      Left Ear: Hearing, tympanic membrane, ear canal and external ear normal.      Nose: No " congestion or rhinorrhea.      Mouth/Throat:      Pharynx: No posterior oropharyngeal erythema.   Eyes:      General: Lids are normal. No scleral icterus.     Extraocular Movements:      Right eye: Normal extraocular motion and no nystagmus.      Left eye: Normal extraocular motion and no nystagmus.      Conjunctiva/sclera: Conjunctivae normal.      Pupils: Pupils are equal, round, and reactive to light.   Neck:      Thyroid: No thyromegaly or thyroid tenderness.      Vascular: No carotid bruit or JVD.      Trachea: No tracheal tenderness.   Cardiovascular:      Rate and Rhythm: Normal rate and regular rhythm.      Pulses:           Dorsalis pedis pulses are 2+ on the right side and 2+ on the left side.        Posterior tibial pulses are 2+ on the right side and 2+ on the left side.      Heart sounds: Normal heart sounds, S1 normal and S2 normal. No murmur heard.      Pulmonary:      Effort: Pulmonary effort is normal. No accessory muscle usage, prolonged expiration or respiratory distress.      Breath sounds: Normal breath sounds.   Chest:      Chest wall: No tenderness.   Abdominal:      General: Bowel sounds are normal.      Palpations: Abdomen is soft. There is no mass.      Tenderness: There is no abdominal tenderness.   Musculoskeletal:         General: No tenderness.      Cervical back: Normal range of motion and neck supple.      Right lower leg: No edema.      Left lower leg: No edema.      Comments: No muscular atrophy or flaccidity.   Lymphadenopathy:      Head:      Right side of head: No submental or submandibular adenopathy.      Left side of head: No submental or submandibular adenopathy.      Cervical: No cervical adenopathy.      Right cervical: No superficial cervical adenopathy.     Left cervical: No superficial cervical adenopathy.   Skin:     General: Skin is warm and dry.      Capillary Refill: Capillary refill takes less than 2 seconds.      Coloration: Skin is not jaundiced or pale.       Findings: No erythema.      Nails: There is no clubbing.   Neurological:      Mental Status: He is alert and oriented to person, place, and time.      Cranial Nerves: No cranial nerve deficit or facial asymmetry.      Sensory: No sensory deficit.      Motor: No tremor, atrophy or abnormal muscle tone.      Coordination: Coordination normal.      Deep Tendon Reflexes: Reflexes are normal and symmetric.   Psychiatric:         Attention and Perception: He is attentive.         Mood and Affect: Mood normal.         Speech: Speech normal.         Behavior: Behavior normal. Behavior is cooperative.         Thought Content: Thought content normal.         Judgment: Judgment normal.       Assessment/Plan     Diagnoses and all orders for this visit:    1. Encounter for examination following treatment at hospital (Primary)  Comments:  Records reviewed.    2. Acute systolic heart failure (HCC)  Overview:  EF 35 to 40% with severe hypokinesia of the LAD  Doppler flow suggestive of grade 1 diastolic dysfunction    Assessment & Plan:  Continue under the care of CHF clinic and cardiology at [facility].  Continue to wear LifeVest as directed.  Continue Lasix and Entresto as directed.  Continue amiodarone 200 as directed      3. Gastroesophageal reflux disease without esophagitis  Assessment & Plan:  Continue Dexilant as directed.  Avoid food triggers.  We will follow up with GI as needed      4. Social anxiety disorder  Assessment & Plan:  Continue Celexa 40 mg.  Continue to work on stress reduction      5. ST elevation myocardial infarction involving left anterior descending (LAD) coronary artery ([facility])  Overview:  Stenting x3 of LAD         Patient's Body mass index is 35.16 kg/m². indicating that he is morbidly obese (BMI > 40 or > 35 with obesity - related health condition). Obesity-related health conditions include the following: hypertension, coronary heart disease and dyslipidemias. Obesity is unchanged. BMI is is above average. We  discussed portion control and increasing exercise..     Understands disease processes and need for medications.  Understands reasons for urgent and emergent care.  Patient (& family) verbalized agreement for treatment plan.   Emotional support and active listening provided.  Patient provided time to verbalize feelings.    Current outpatient and discharge medications have been reconciled for the patient.  Reviewed by: NANCY Jessica  Hospital records reviewed.  Discussed any specific concerns patient had regarding testing, labs, Etc.     RTC 2 months, sooner if needed.             This document has been electronically signed by:  NANCY Jessica, MITCHELP-C    Dragon disclaimer:  Part of this note may be an electronic transcription/translation of spoken language to printed text using the Dragon Dictation System.

## 2021-12-15 DIAGNOSIS — Z95.5 STENTED CORONARY ARTERY: Primary | ICD-10-CM

## 2021-12-15 DIAGNOSIS — I21.02 ST ELEVATION MYOCARDIAL INFARCTION INVOLVING LEFT ANTERIOR DESCENDING (LAD) CORONARY ARTERY (HCC): ICD-10-CM

## 2021-12-16 PROBLEM — I21.02 ST ELEVATION MYOCARDIAL INFARCTION INVOLVING LEFT ANTERIOR DESCENDING (LAD) CORONARY ARTERY: Status: ACTIVE | Noted: 2021-12-07

## 2021-12-16 NOTE — ASSESSMENT & PLAN NOTE
Continue under the care of CHF clinic and cardiology at [facility].  Continue to wear LifeVest as directed.  Continue Lasix and Entresto as directed.  Continue amiodarone 200 as directed

## 2021-12-21 ENCOUNTER — TREATMENT (OUTPATIENT)
Dept: CARDIAC REHAB | Facility: HOSPITAL | Age: 41
End: 2021-12-21

## 2021-12-21 VITALS
BODY MASS INDEX: 34.7 KG/M2 | HEART RATE: 59 BPM | RESPIRATION RATE: 12 BRPM | HEIGHT: 70 IN | DIASTOLIC BLOOD PRESSURE: 66 MMHG | WEIGHT: 242.4 LBS | OXYGEN SATURATION: 98 % | SYSTOLIC BLOOD PRESSURE: 102 MMHG

## 2021-12-21 DIAGNOSIS — Z95.5 STENTED CORONARY ARTERY: Primary | ICD-10-CM

## 2021-12-21 DIAGNOSIS — I21.02 ST ELEVATION MYOCARDIAL INFARCTION INVOLVING LEFT ANTERIOR DESCENDING (LAD) CORONARY ARTERY (HCC): ICD-10-CM

## 2021-12-21 PROCEDURE — 93798 PHYS/QHP OP CAR RHAB W/ECG: CPT

## 2021-12-21 NOTE — PATIENT INSTRUCTIONS
Pre-Exercise Meal Plan  Eating certain foods and drinking fluids before you exercise can give you more energy and help your muscles recover afterward. A healthy pre-exercise meal plan can depend on:  · Your personal needs.  · Recommendations from your health care provider or a dietitian.  · Any medical conditions you have that affect what you can and cannot eat.  · How long your exercise session will be.  · The amount of time between eating and exercise.  What are tips for following this plan?    Reading food labels  · Avoid foods that have extra added sugar, fat, and salt (sodium).  · Avoid foods that are high in fiber. This includes foods that have more than 5 grams (g) of fiber per serving.  Meal planning  · In general, you should eat your pre-exercise meal 1-4 hours before you exercise, depending on how your body digests food. If you eat more than 3-4 hours before exercising, you should eat more calories. Your meal should have enough calories so that you will not be hungry before you exercise.  · Include carbohydrates and a moderate amount of lean protein in your pre-exercise meal. Have a small amount of fat with this meal.  · If you eat your pre-exercise meal 1 hour before your exercise, choose foods that are easy to digest. These include foods that are low in fat and fiber and have a moderate amount of protein. This will help you avoid stomach discomfort.  Lifestyle  Consider doing the following 30-60 minutes before you exercise:  · Stop eating, or have a small snack or a sports drink with 15 g of carbohydrates. This should depend on how your body digests food close to the time you exercise.  · Drink fluids, such as:  ? Water.  ? Sports drinks.  ? Fruit juice.  ? Sports gels.  General information  · Eat familiar foods that your body digests without problems. Doing this can help you avoid stomach problems while exercising.  · Drink enough fluids to keep you well hydrated before, during, and after exercising.  Good choices include:  ? Water.  ? Sports drinks. These are only recommended if you exercise for more than 60 minutes at a time.  · The size of your meal will depend on the amount of time before your exercise. If you have your meal 3-4 hours before your workout, then it should be bigger than if you eat 1-2 hours before your workout.  What foods should I eat?    Fruits  Banana. Apple. Berries. Melon. Raisins.  Vegetables  Celery. Cucumbers. Carrots. Baked potato.  Grains  Whole-grain pasta. Bagel. Toast. Oatmeal.  Meats and other proteins  Fish. Poultry. Lean meats. Nut butters.  Dairy  Low-fat or fat-free dairy products, such as milk or yogurt.  Other foods  Foods that have both carbohydrates and protein, such as:  · A bagel with peanut butter.  · A turkey sandwich.  · Oatmeal with fruit and nuts.  · A peanut butter and banana sandwich or a peanut butter and jelly sandwich.  · Nonfat or low-fat Greek yogurt and berries.  The items listed above may not be a complete list of recommended foods and beverages. Contact a dietitian for more information.  What foods should I avoid?  Vegetables  Onions. Cauliflower. Cabbage. Leafy greens. Fried vegetables. French fries.  Grains  Crackers. Pretzels. Donuts. Pastries.  Meats and other proteins  Fatty cuts of meat. Sausage. De Jesus. Fried meats. Dried beans. Legumes.  Dairy  Full-fat milk or yogurt. Cream. Sour cream. Ice cream.  Beverages  Carbonated soft drinks. Coffee. Black tea. Hot chocolate.  Sweets and desserts  Cakes. Cookies. Candy.  Other foods  Processed or pre-made foods. Pizza. Fried food. Fast food.  The items listed above may not be a complete list of foods and beverages to avoid. Contact a dietitian for more information.  Summary  · The best time to eat a pre-exercise meal depends on your personal needs and when you plan to exercise. In general, you should eat 1-4 hours before you exercise.  · Your pre-exercise meal should include carbohydrates, lean proteins, a  small amount of fat, and fluids. Choose familiar foods that are easy to digest.  · Avoid foods that are high in salt (sodium), sugar, fat, or fiber in your pre-exercise meal.  This information is not intended to replace advice given to you by your health care provider. Make sure you discuss any questions you have with your health care provider.  Document Revised: 04/22/2021 Document Reviewed: 04/22/2021  [employer] Patient Education © 2021 [employer]  Frequently Asked Questions About COVID-19 Vaccination  · Below are answers to commonly asked questions about COVID-19 vaccination.  · Bust myths and learn the facts about COVID-19 vaccines  If I have already had COVID-19 and recovered, do I still need to get vaccinated with a COVID-19 vaccine?  Yes, you should be vaccinated regardless of whether you already had COVID-19. That's because experts do not yet know how long you are protected from getting sick again after recovering from COVID-19. Even if you have already recovered from COVID-19, it is possible--although rare--that you could be infected with the virus that causes COVID-19 again. Studies have shown that vaccination provides a strong boost in protection in people who have recovered from COVID-19. Learn more about why getting vaccinated is a safer way to build protection than getting infected.  If you were treated for COVID-19 with monoclonal antibodies or convalescent plasma, you should wait 90 days before getting a COVID-19 vaccine. Talk to your doctor if you are unsure what treatments you received or if you have more questions about getting a COVID-19 vaccine.  If you or your child has a history of multisystem inflammatory syndrome in adults or children (MIS-A or MIS-C), consider delaying vaccination until you or your child have recovered from being sick and for 90 days after the date of diagnosis of MIS-A or MIS-C. Learn more about the clinical considerations people with a history of multisystem MIS-C or  MIS-A.  Experts are still learning more about how long vaccines protect against COVID-19. CDC will keep the public informed as new evidence becomes available.  Related pages:  · Benefits of Getting Vaccinated  · Preparing for Your COVID-19 Vaccination  Is it safe for my child to get a COVID-19 vaccine?  Yes. Studies show that COVID-19 vaccines are safe and effective. Like adults, children may have some side effects after COVID-19 vaccination. These side effects may affect their ability to do daily activities, but they should go away in a few days. Children 12 years and older are now eligible to get vaccinated against COVID-19. COVID-19 vaccines have been used under the most intensive safety monitoring in [place] history, including studies in children 12 years and older. Your child cannot get COVID-19 from any COVID-19 vaccine.  Related page:  · COVID-19 Vaccines for Children and Teens  Why should my child get vaccinated against COVID-19?  COVID-19 vaccination can help protect your child from getting COVID-19. Although fewer children have been sick with COVID-19 compared to adults, children can be infected with the virus that causes COVID-19, can get sick from COVID-19, and can spread the virus that causes COVID-19 to others. Getting your child vaccinated helps to protect your child and your family. Vaccination is now recommended for everyone 12 years and older. Currently, the Pfizer-BioNTech COVID-19 Vaccine is the only one available to children 12 years and older.  Related page:  · COVID-19 Vaccines for Children and Teens  What are the ingredients in COVID-19 vaccines?  Vaccine ingredients can vary by . To learn more about the ingredients in authorized COVID-19 vaccines, see  · Information about the Pfizer-BioNTech COVID-19 Vaccine  · Information about the Moderna COVID-19 Vaccine  · Information about the [employer] Corthera COVID-19 Vaccine  · Ingredients Included in COVID-19 Vaccines  Do I need  to wear a mask and avoid close contact with others if I am fully vaccinated?  No. Fully vaccinated people can resume activities without wearing a mask or physically distancing, except where required by federal, state, local, [place], or territorial laws, rules, and regulations, including local business and workplace guidance. If you are fully vaccinated, you can resume activities that you did before the pandemic.  Additional recommendations can be found at When You've Been Fully Vaccinated.  Related pages:  · When You've Been Fully Vaccinated  · Key Things to Know  · Protect Yourself and Others  Can I choose which COVID-19 vaccine I get?  Yes. All currently authorized and recommended COVID-19 vaccines are safe and effective, and CDC does not recommend one vaccine over another. The most important decision is to get a COVID-19 vaccination as soon as possible. Widespread vaccination is a critical tool to help stop the pandemic.  People should be aware that a risk of a rare condition called thrombosis with thrombocytopenia syndrome (TTS) has been reported following vaccination with the [employer] COVID-19 Vaccine. TTS is a serious condition that involves blood clots with low platelet counts. This problem is rare, and most reports were in women between 18 and 49 years old. For women 50 years and older and men of any age, this problem is even more rare. There are other COVID-19 vaccine options available for which this risk has not been seen ([employer], Moderna).  Learn more about your COVID-19 vaccination, including how to find a vaccination location, what to expect at your appointment, and more.  Related page:  · Your Vaccination  If I am pregnant, can I get a COVID-19 vaccine?  Yes, if you are pregnant, you can receive a COVID-19 vaccine.  You might want to have a conversation with your healthcare provider to help you decide whether to get vaccinated. While such a conversation might be helpful, it is not required  before vaccination. Learn more about vaccination considerations for people who are pregnant or breastfeeding.  If you are pregnant and have received a COVID-19 vaccine, we encourage you to enroll in My Pick Box, [employer]'s smartphone-based tool that provides personalized health check-ins after vaccination. A vFreepathsafe pregnancy registry has been established to gather information on the health of pregnant people who have received a COVID-19 vaccine.  Related pages:  · COVID-19 Vaccines for Pregnant or Breastfeeding People  · Monitoring Systems for Pregnant People  · protected-networks.comsafe Pregnancy Registry  How long does protection from a COVID-19 vaccine last?  We don't know how long protection lasts for those who are vaccinated. What we do know is that COVID-19 has caused very serious illness and death for a lot of people. If you get COVID-19, you also risk giving it to loved ones who may get very sick. Getting a COVID-19 vaccine is a safer choice.  Experts are working to learn more about both natural immunity and vaccine-induced immunity. [facility] will keep the public informed as new evidence becomes available.  Related page:  · Vaccines Work  How many doses of COVID-19 vaccine will I need to get?  The number of doses needed depends on which vaccine you receive. To get the most protection:  · Two Pfizer-BioNTech vaccine doses should be given 3 weeks (21 days) apart.  · Two Moderna vaccine doses should be given 1 month (28 days) apart.  · [employer] & [place] [place] (J&J/Jenae) COVID-19 vaccine requires only one dose.  If you receive a vaccine that requires two doses, you should get your second shot as close to the recommended interval as possible. However, your second dose may be given up to 6 weeks (42 days) after the first dose, if necessary.. You should not get the second dose earlier than the recommended interval.  Related pages:  · Pfizer-BioNTech  · Moderna  · Pola & Pola / Jenae  If I have an underlying condition, can I get a  COVID-19 vaccine?  People with underlying medical conditions can receive a COVID-19 vaccine as long as they have not had an immediate or severe allergic reaction to a COVID-19 vaccine or to any of the ingredients in the vaccine. Learn more about vaccination considerations for people with underlying medical conditions. Vaccination is an important consideration for adults of any age with certain underlying medical conditionsbecause they are at increased risk for severe illness from COVID-19.  Related pages:  · Underlying Medical Conditions  · People at High Risk  · People with Allergies  Can I get vaccinated against COVID-19 while I am currently sick with COVID-19?  No. People with COVID-19 who have symptoms should wait to be vaccinated until they have recovered from their illness and have met the criteria for discontinuing isolation; those without symptoms should also wait until they meet the criteria before getting vaccinated. This guidance also applies to people who get COVID-19 before getting their second dose of vaccine.  Related pages:  · When to Quarantine  · Ending Home Isolation  Answers to more questions about:  · Healthcare Professionals and COVID-19 Vaccines  · Vaccines.gov  · Vaccine Administration Management System (VAMS)  · COVID-19 Vaccination in Long-term Care Facilities  · V-safe after Vaccination Health   06/15/2021  Content source: National Center for Immunization and Respiratory Diseases (NCIRD), Division of Viral Diseases  This information is not intended to replace advice given to you by your health care provider. Make sure you discuss any questions you have with your health care provider.  Document Revised: 08/04/2021 Document Reviewed: 08/04/2021  [employer] Patient Education © 2021 [employer]    Managing Anxiety, Adult  After being diagnosed with an anxiety disorder, you may be relieved to know why you have felt or behaved a certain way. You may also feel overwhelmed about the  treatment ahead and what it will mean for your life. With care and support, you can manage this condition and recover from it.  How to manage lifestyle changes  Managing stress and anxiety    Stress is your body's reaction to life changes and events, both good and bad. Most stress will last just a few hours, but stress can be ongoing and can lead to more than just stress. Although stress can play a major role in anxiety, it is not the same as anxiety. Stress is usually caused by something external, such as a deadline, test, or competition. Stress normally passes after the triggering event has ended.   Anxiety is caused by something internal, such as imagining a terrible outcome or worrying that something will go wrong that will devastate you. Anxiety often does not go away even after the triggering event is over, and it can become long-term (chronic) worry. It is important to understand the differences between stress and anxiety and to manage your stress effectively so that it does not lead to an anxious response.  Talk with your health care provider or a counselor to learn more about reducing anxiety and stress. He or she may suggest tension reduction techniques, such as:  · Music therapy. This can include creating or listening to music that you enjoy and that inspires you.  · Mindfulness-based meditation. This involves being aware of your normal breaths while not trying to control your breathing. It can be done while sitting or walking.  · Centering prayer. This involves focusing on a word, phrase, or sacred image that means something to you and brings you peace.  · Deep breathing. To do this, expand your stomach and inhale slowly through your nose. Hold your breath for 3-5 seconds. Then exhale slowly, letting your stomach muscles relax.  · Self-talk. This involves identifying thought patterns that lead to anxiety reactions and changing those patterns.  · Muscle relaxation. This involves tensing muscles and then  relaxing them.  Choose a tension reduction technique that suits your lifestyle and personality. These techniques take time and practice. Set aside 5-15 minutes a day to do them. Therapists can offer counseling and training in these techniques. The training to help with anxiety may be covered by some insurance plans. Other things you can do to manage stress and anxiety include:  · Keeping a stress/anxiety diary. This can help you learn what triggers your reaction and then learn ways to manage your response.  · Thinking about how you react to certain situations. You may not be able to control everything, but you can control your response.  · Making time for activities that help you relax and not feeling guilty about spending your time in this way.  · Visual imagery and yoga can help you stay calm and relax.    Medicines  Medicines can help ease symptoms. Medicines for anxiety include:  · Anti-anxiety drugs.  · Antidepressants.  Medicines are often used as a primary treatment for anxiety disorder. Medicines will be prescribed by a health care provider. When used together, medicines, psychotherapy, and tension reduction techniques may be the most effective treatment.  Relationships  Relationships can play a big part in helping you recover. Try to spend more time connecting with trusted friends and family members. Consider going to couples counseling, taking family education classes, or going to family therapy. Therapy can help you and others better understand your condition.  How to recognize changes in your anxiety  Everyone responds differently to treatment for anxiety. Recovery from anxiety happens when symptoms decrease and stop interfering with your daily activities at home or work. This may mean that you will start to:  · Have better concentration and focus. Worry will interfere less in your daily thinking.  · Sleep better.  · Be less irritable.  · Have more energy.  · Have improved memory.  It is important to  recognize when your condition is getting worse. Contact your health care provider if your symptoms interfere with home or work and you feel like your condition is not improving.  Follow these instructions at home:  Activity  · Exercise. Most adults should do the following:  ? Exercise for at least 150 minutes each week. The exercise should increase your heart rate and make you sweat (moderate-intensity exercise).  ? Strengthening exercises at least twice a week.  · Get the right amount and quality of sleep. Most adults need 7-9 hours of sleep each night.  Lifestyle    · Eat a healthy diet that includes plenty of vegetables, fruits, whole grains, low-fat dairy products, and lean protein. Do not eat a lot of foods that are high in solid fats, added sugars, or salt.  · Make choices that simplify your life.  · Do not use any products that contain nicotine or tobacco, such as cigarettes, e-cigarettes, and chewing tobacco. If you need help quitting, ask your health care provider.  · Avoid caffeine, alcohol, and certain over-the-counter cold medicines. These may make you feel worse. Ask your pharmacist which medicines to avoid.    General instructions  · Take over-the-counter and prescription medicines only as told by your health care provider.  · Keep all follow-up visits as told by your health care provider. This is important.  Where to find support  You can get help and support from these sources:  · Self-help groups.  · Online and community organizations.  · A trusted spiritual leader.  · Couples counseling.  · Family education classes.  · Family therapy.  Where to find more information  You may find that joining a support group helps you deal with your anxiety. The following sources can help you locate counselors or support groups near you:  · Mental Health [place]: www.mentalhealth[place].net  · Anxiety and Depression Association of [place] (ADAA): www.adaa.org  · National [place] on Mental Illness ([place]IC[place]):  www.teri.org  Contact a health care provider if you:  · Have a hard time staying focused or finishing daily tasks.  · Spend many hours a day feeling worried about everyday life.  · Become exhausted by worry.  · Start to have headaches, feel tense, or have nausea.  · Urinate more than normal.  · Have diarrhea.  Get help right away if you have:  · A racing heart and shortness of breath.  · Thoughts of hurting yourself or others.  If you ever feel like you may hurt yourself or others, or have thoughts about taking your own life, get help right away. You can go to your nearest emergency department or call:  · Your local emergency services (911 in the [place]  · A suicide crisis helpline, such as the National Suicide Prevention Lifeline at 1-555.886.4053. This is open 24 hours a day.  Summary  · Taking steps to learn and use tension reduction techniques can help calm you and help prevent triggering an anxiety reaction.  · When used together, medicines, psychotherapy, and tension reduction techniques may be the most effective treatment.  · Family, friends, and partners can play a big part in helping you recover from an anxiety disorder.  This information is not intended to replace advice given to you by your health care provider. Make sure you discuss any questions you have with your health care provider.  Document Revised: 05/19/2020 Document Reviewed: 05/19/2020  [employer] Patient Education © 2021 [employer]    Managing Stress, Adult  Feeling a certain amount of stress is normal. Stress helps our body and mind get ready to deal with the demands of life. Stress hormones can motivate you to do well at work and meet your responsibilities. However severe or long-lasting (chronic) stress can affect your mental and physical health. Chronic stress puts you at higher risk for anxiety, depression, and other health problems like digestive problems, muscle aches, heart disease, high blood pressure, and stroke.  What are the  causes?  Common causes of stress include:  · Demands from work, such as deadlines, feeling overworked, or having long hours.  · Pressures at home, such as money issues, disagreements with a spouse, or parenting issues.  · Pressures from major life changes, such as divorce, moving, loss of a loved one, or chronic illness.  You may be at higher risk for stress-related problems if you do not get enough sleep, are in poor health, do not have emotional support, or have a mental health disorder like anxiety or depression.  How to recognize stress  Stress can make you:  · Have trouble sleeping.  · Feel sad, anxious, irritable, or overwhelmed.  · Lose your appetite.  · Overeat or want to eat unhealthy foods.  · Want to use drugs or alcohol.  Stress can also cause physical symptoms, such as:  · Sore, tense muscles, especially in the shoulders and neck.  · Headaches.  · Trouble breathing.  · A faster heart rate.  · Stomach pain, nausea, or vomiting.  · Diarrhea or constipation.  · Trouble concentrating.  Follow these instructions at home:  Lifestyle  · Identify the source of your stress and your reaction to it. See a therapist who can help you change your reactions.  · When there are stressful events:  ? Talk about it with family, friends, or co-workers.  ? Try to think realistically about stressful events and not ignore them or overreact.  ? Try to find the positives in a stressful situation and not focus on the negatives.  ? Cut back on responsibilities at work and home, if possible. Ask for help from friends or family members if you need it.  · Find ways to cope with stress, such as:  ? Meditation.  ? Deep breathing.  ? Yoga or fatimah chi.  ? Progressive muscle relaxation.  ? Doing art, playing music, or reading.  ? Making time for fun activities.  ? Spending time with family and friends.  · Get support from family, friends, or spiritual resources.  Eating and drinking  · Eat a healthy diet. This includes:  ? Eating foods  that are high in fiber, such as beans, whole grains, and fresh fruits and vegetables.  ? Limiting foods that are high in fat and processed sugars, such as fried and sweet foods.  · Do not skip meals or overeat.  · Drink enough fluid to keep your urine pale yellow.  Alcohol use  · Do not drink alcohol if:  ? Your health care provider tells you not to drink.  ? You are pregnant, may be pregnant, or are planning to become pregnant.  · Drinking alcohol is a way some people try to ease their stress. This can be dangerous, so if you drink alcohol:  ? Limit how much you use to:  § 0-1 drink a day for women.  § 0-2 drinks a day for men.  ? Be aware of how much alcohol is in your drink. In the [place] one drink equals one 12 oz bottle of beer (355 mL), one 5 oz glass of wine (148 mL), or one 1½ oz glass of hard liquor (44 mL).  Activity    · Include 30 minutes of exercise in your daily schedule. Exercise is a good stress reducer.  · Include time in your day for an activity that you find relaxing. Try taking a walk, going on a bike ride, reading a book, or listening to music.  · Schedule your time in a way that lowers stress, and keep a consistent schedule. Prioritize what is most important to get done.    General instructions  · Get enough sleep. Try to go to sleep and get up at about the same time every day.  · Take over-the-counter and prescription medicines only as told by your health care provider.  · Do not use any products that contain nicotine or tobacco, such as cigarettes, e-cigarettes, and chewing tobacco. If you need help quitting, ask your health care provider.  · Do not use drugs or smoke to cope with stress.  · Keep all follow-up visits as told by your health care provider. This is important.  Where to find support  · Talk with your health care provider about stress management or finding a support group.  · Find a therapist to work with you on your stress management techniques.  Contact a health care provider  "if:  · Your stress symptoms get worse.  · You are unable to manage your stress at home.  · You are struggling to stop using drugs or alcohol.  Get help right away if:  · You may be a danger to yourself or others.  · You have any thoughts of death or suicide.  If you ever feel like you may hurt yourself or others, or have thoughts about taking your own life, get help right away. You can go to your nearest emergency department or call:  · Your local emergency services (911 in the [place]  · A suicide crisis helpline, such as the National Suicide Prevention Lifeline at 1-658.583.1661. This is open 24 hours a day.  Summary  · Feeling a certain amount of stress is normal, but severe or long-lasting (chronic) stress can affect your mental and physical health.  · Chronic stress can put you at higher risk for anxiety, depression, and other health problems like digestive problems, muscle aches, heart disease, high blood pressure, and stroke.  · You may be at higher risk for stress-related problems if you do not get enough sleep, are in poor health, lack emotional support, or have a mental health disorder like anxiety or depression.  · Identify the source of your stress and your reaction to it. Try talking about stressful events with family, friends, or co-workers, finding a coping method, or getting support from spiritual resources.  · If you need more help, talk with your health care provider about finding a support group or a mental health therapist.  This information is not intended to replace advice given to you by your health care provider. Make sure you discuss any questions you have with your health care provider.  Document Revised: 07/15/2020 Document Reviewed: 07/15/2020  [employer] Patient Education © 2021 [employer]    http://APA.org/depression-guideline\"> https://clinicalkey.com\"> http://point-of-care.[employer]/skills/\"> http://point-of-care.[employer].com\">   Managing " Depression, Adult  Depression is a mental health condition that affects your thoughts, feelings, and actions. Being diagnosed with depression can bring you relief if you did not know why you have felt or behaved a certain way. It could also leave you feeling overwhelmed with uncertainty about your future. Preparing yourself to manage your symptoms can help you feel more positive about your future.  How to manage lifestyle changes  Managing stress    Stress is your body's reaction to life changes and events, both good and bad. Stress can add to your feelings of depression. Learning to manage your stress can help lessen your feelings of depression.  Try some of the following approaches to reducing your stress (stress reduction techniques):  · Listen to music that you enjoy and that inspires you.  · Try using a meditation yasmeen or take a meditation class.  · Develop a practice that helps you connect with your spiritual self. Walk in nature, pray, or go to a place of Temple.  · Do some deep breathing. To do this, inhale slowly through your nose. Pause at the top of your inhale for a few seconds and then exhale slowly, letting your muscles relax.  · Practice yoga to help relax and work your muscles.  Choose a stress reduction technique that suits your lifestyle and personality. These techniques take time and practice to develop. Set aside 5-15 minutes a day to do them. Therapists can offer training in these techniques. Other things you can do to manage stress include:  · Keeping a stress diary.  · Knowing your limits and saying no when you think something is too much.  · Paying attention to how you react to certain situations. You may not be able to control everything, but you can change your reaction.  · Adding humor to your life by watching funny films or TV shows.  · Making time for activities that you enjoy and that relax you.    Medicines  Medicines, such as antidepressants, are often a part of treatment for  depression.  · Talk with your pharmacist or health care provider about all the medicines, supplements, and herbal products that you take, their possible side effects, and what medicines and other products are safe to take together.  · Make sure to report any side effects you may have to your health care provider.  Relationships  Your health care provider may suggest family therapy, couples therapy, or individual therapy as part of your treatment.  How to recognize changes  Everyone responds differently to treatment for depression. As you recover from depression, you may start to:  · Have more interest in doing activities.  · Feel less hopeless.  · Have more energy.  · Overeat less often, or have a better appetite.  · Have better mental focus.  It is important to recognize if your depression is not getting better or is getting worse. The symptoms you had in the beginning may return, such as:  · Tiredness (fatigue) or low energy.  · Eating too much or too little.  · Sleeping too much or too little.  · Feeling restless, agitated, or hopeless.  · Trouble focusing or making decisions.  · Unexplained physical complaints.  · Feeling irritable, angry, or aggressive.  If you or your family members notice these symptoms coming back, let your health care provider know right away.  Follow these instructions at home:  Activity    · Try to get some form of exercise each day, such as walking, biking, swimming, or lifting weights.  · Practice stress reduction techniques.  · Engage your mind by taking a class or doing some volunteer work.    Lifestyle  · Get the right amount and quality of sleep.  · Cut down on using caffeine, tobacco, alcohol, and other potentially harmful substances.  · Eat a healthy diet that includes plenty of vegetables, fruits, whole grains, low-fat dairy products, and lean protein. Do not eat a lot of foods that are high in solid fats, added sugars, or salt (sodium).  General instructions  · Take  over-the-counter and prescription medicines only as told by your health care provider.  · Keep all follow-up visits as told by your health care provider. This is important.  Where to find support  Talking to others    Friends and family members can be sources of support and guidance. Talk to trusted friends or family members about your condition. Explain your symptoms to them, and let them know that you are working with a health care provider to treat your depression. Tell friends and family members how they also can be helpful.  Finances  · Find appropriate mental health providers that fit with your financial situation.  · Talk with your health care provider about options to get reduced prices on your medicines.  Where to find more information  You can find support in your area from:  · Anxiety and Depression Association of [place] (ADAA): www.adaa.org  · Mental Health [place]: www.mentalhealth[place].net  · National [place] on Mental Illness: www.teri.org  Contact a health care provider if:  · You stop taking your antidepressant medicines, and you have any of these symptoms:  ? Nausea.  ? Headache.  ? Light-headedness.  ? Chills and body aches.  ? Not being able to sleep (insomnia).  · You or your friends and family think your depression is getting worse.  Get help right away if:  · You have thoughts of hurting yourself or others.  If you ever feel like you may hurt yourself or others, or have thoughts about taking your own life, get help right away. Go to your nearest emergency department or:  · Call your local emergency services (911 in the [place]  · Call a suicide crisis helpline, such as the National Suicide Prevention Lifeline at 1-652.670.5929. This is open 24 hours a day in the [place]  · Text the Crisis Text Line at 674824 (in the [place]  Summary  · If you are diagnosed with depression, preparing yourself to manage your symptoms is a good way to feel positive about your future.  · Work with your health care  provider on a management plan that includes stress reduction techniques, medicines (if applicable), therapy, and healthy lifestyle habits.  · Keep talking with your health care provider about how your treatment is working.  · If you have thoughts about taking your own life, call a suicide crisis helpline or text a crisis text line.  This information is not intended to replace advice given to you by your health care provider. Make sure you discuss any questions you have with your health care provider.  Document Revised: 10/28/2020 Document Reviewed: 10/28/2020  [employer] Patient Education © 2021 [employer]    Managing Your Hypertension  Hypertension, also called high blood pressure, is when the force of the blood pressing against the walls of the arteries is too strong. Arteries are blood vessels that carry blood from your heart throughout your body. Hypertension forces the heart to work harder to pump blood and may cause the arteries to become narrow or stiff.  Understanding blood pressure readings  Your personal target blood pressure may vary depending on your medical conditions, your age, and other factors. A blood pressure reading includes a higher number over a lower number. Ideally, your blood pressure should be below 120/80. You should know that:  · The first, or top, number is called the systolic pressure. It is a measure of the pressure in your arteries as your heart beats.  · The second, or bottom number, is called the diastolic pressure. It is a measure of the pressure in your arteries as the heart relaxes.  Blood pressure is classified into four stages. Based on your blood pressure reading, your health care provider may use the following stages to determine what type of treatment you need, if any. Systolic pressure and diastolic pressure are measured in a unit called mmHg.  Normal  · Systolic pressure: below 120.  · Diastolic pressure: below 80.  Elevated  · Systolic pressure: 120-129.  · Diastolic  pressure: below 80.  Hypertension stage 1  · Systolic pressure: 130-139.  · Diastolic pressure: 80-89.  Hypertension stage 2  · Systolic pressure: 140 or above.  · Diastolic pressure: 90 or above.  How can this condition affect me?  Managing your hypertension is an important responsibility. Over time, hypertension can damage the arteries and decrease blood flow to important parts of the body, including the brain, heart, and kidneys. Having untreated or uncontrolled hypertension can lead to:  · A heart attack.  · A stroke.  · A weakened blood vessel (aneurysm).  · Heart failure.  · Kidney damage.  · Eye damage.  · Metabolic syndrome.  · Memory and concentration problems.  · Vascular dementia.  What actions can I take to manage this condition?  Hypertension can be managed by making lifestyle changes and possibly by taking medicines. Your health care provider will help you make a plan to bring your blood pressure within a normal range.  Nutrition    · Eat a diet that is high in fiber and potassium, and low in salt (sodium), added sugar, and fat. An example eating plan is called the Dietary Approaches to Stop Hypertension (DASH) diet. To eat this way:  ? Eat plenty of fresh fruits and vegetables. Try to fill one-half of your plate at each meal with fruits and vegetables.  ? Eat whole grains, such as whole-wheat pasta, brown rice, or whole-grain bread. Fill about one-fourth of your plate with whole grains.  ? Eat low-fat dairy products.  ? Avoid fatty cuts of meat, processed or cured meats, and poultry with skin. Fill about one-fourth of your plate with lean proteins such as fish, chicken without skin, beans, eggs, and tofu.  ? Avoid pre-made and processed foods. These tend to be higher in sodium, added sugar, and fat.  · Reduce your daily sodium intake. Most people with hypertension should eat less than 1,500 mg of sodium a day.    Lifestyle    · Work with your health care provider to maintain a healthy body weight or  to lose weight. Ask what an ideal weight is for you.  · Get at least 30 minutes of exercise that causes your heart to beat faster (aerobic exercise) most days of the week. Activities may include walking, swimming, or biking.  · Include exercise to strengthen your muscles (resistance exercise), such as weight lifting, as part of your weekly exercise routine. Try to do these types of exercises for 30 minutes at least 3 days a week.  · Do not use any products that contain nicotine or tobacco, such as cigarettes, e-cigarettes, and chewing tobacco. If you need help quitting, ask your health care provider.  · Control any long-term (chronic) conditions you have, such as high cholesterol or diabetes.  · Identify your sources of stress and find ways to manage stress. This may include meditation, deep breathing, or making time for fun activities.    Alcohol use  · Do not drink alcohol if:  ? Your health care provider tells you not to drink.  ? You are pregnant, may be pregnant, or are planning to become pregnant.  · If you drink alcohol:  ? Limit how much you use to:  § 0-1 drink a day for women.  § 0-2 drinks a day for men.  ? Be aware of how much alcohol is in your drink. In the [place] one drink equals one 12 oz bottle of beer (355 mL), one 5 oz glass of wine (148 mL), or one 1½ oz glass of hard liquor (44 mL).  Medicines  Your health care provider may prescribe medicine if lifestyle changes are not enough to get your blood pressure under control and if:  · Your systolic blood pressure is 130 or higher.  · Your diastolic blood pressure is 80 or higher.  Take medicines only as told by your health care provider. Follow the directions carefully. Blood pressure medicines must be taken as told by your health care provider. The medicine does not work as well when you skip doses. Skipping doses also puts you at risk for problems.  Monitoring  Before you monitor your blood pressure:  · Do not smoke, drink caffeinated beverages, or  exercise within 30 minutes before taking a measurement.  · Use the bathroom and empty your bladder (urinate).  · Sit quietly for at least 5 minutes before taking measurements.  Monitor your blood pressure at home as told by your health care provider. To do this:  · Sit with your back straight and supported.  · Place your feet flat on the floor. Do not cross your legs.  · Support your arm on a flat surface, such as a table. Make sure your upper arm is at heart level.  · Each time you measure, take two or three readings one minute apart and record the results.  You may also need to have your blood pressure checked regularly by your health care provider.  General information  · Talk with your health care provider about your diet, exercise habits, and other lifestyle factors that may be contributing to hypertension.  · Review all the medicines you take with your health care provider because there may be side effects or interactions.  · Keep all visits as told by your health care provider. Your health care provider can help you create and adjust your plan for managing your high blood pressure.  Where to find more information  · National Heart, Lung, and Blood [place]: www.nhlbi.nih.gov  · American Heart Association: www.heart.org  Contact a health care provider if:  · You think you are having a reaction to medicines you have taken.  · You have repeated (recurrent) headaches.  · You feel dizzy.  · You have swelling in your ankles.  · You have trouble with your vision.  Get help right away if:  · You develop a severe headache or confusion.  · You have unusual weakness or numbness, or you feel faint.  · You have severe pain in your chest or abdomen.  · You vomit repeatedly.  · You have trouble breathing.  These symptoms may represent a serious problem that is an emergency. Do not wait to see if the symptoms will go away. Get medical help right away. Call your local emergency services (911 in the [place] Do not drive  yourself to the hospital.  Summary  · Hypertension is when the force of blood pumping through your arteries is too strong. If this condition is not controlled, it may put you at risk for serious complications.  · Your personal target blood pressure may vary depending on your medical conditions, your age, and other factors. For most people, a normal blood pressure is less than 120/80.  · Hypertension is managed by lifestyle changes, medicines, or both.  · Lifestyle changes to help manage hypertension include losing weight, eating a healthy, low-sodium diet, exercising more, stopping smoking, and limiting alcohol.  This information is not intended to replace advice given to you by your health care provider. Make sure you discuss any questions you have with your health care provider.  Document Revised: 01/22/2021 Document Reviewed: 11/17/2020  [employer] Patient Education © 2021 [employer]    Dyslipidemia  Dyslipidemia is an imbalance of waxy, fat-like substances (lipids) in the blood. The body needs lipids in small amounts. Dyslipidemia often involves a high level of cholesterol or triglycerides, which are types of lipids.  Common forms of dyslipidemia include:  · High levels of LDL cholesterol. LDL is the type of cholesterol that causes fatty deposits (plaques) to build up in the blood vessels that carry blood away from your heart (arteries).  · Low levels of HDL cholesterol. HDL cholesterol is the type of cholesterol that protects against heart disease. High levels of HDL remove the LDL buildup from arteries.  · High levels of triglycerides. Triglycerides are a fatty substance in the blood that is linked to a buildup of plaques in the arteries.  What are the causes?  Primary dyslipidemia is caused by changes (mutations) in genes that are passed down through families (inherited). These mutations cause several types of dyslipidemia.  Secondary dyslipidemia is caused by lifestyle choices and diseases that lead to  dyslipidemia, such as:  · Eating a diet that is high in animal fat.  · Not getting enough exercise.  · Having diabetes, kidney disease, liver disease, or thyroid disease.  · Drinking large amounts of alcohol.  · Using certain medicines.  What increases the risk?  You are more likely to develop this condition if you are an older man or if you are a woman who has gone through menopause. Other risk factors include:  · Having a family history of dyslipidemia.  · Taking certain medicines, including birth control pills, steroids, some diuretics, and beta-blockers.  · Smoking cigarettes.  · Eating a high-fat diet.  · Having certain medical conditions such as diabetes, polycystic ovary syndrome (PCOS), kidney disease, liver disease, or hypothyroidism.  · Not exercising regularly.  · Being overweight or obese with too much belly fat.  What are the signs or symptoms?  In most cases, dyslipidemia does not usually cause any symptoms.  In severe cases, very high lipid levels can cause:  · Fatty bumps under the skin (xanthomas).  · White or gray ring around the black center (pupil) of the eye.  Very high triglyceride levels can cause inflammation of the pancreas (pancreatitis).  How is this diagnosed?  Your health care provider may diagnose dyslipidemia based on a routine blood test (fasting blood test). Because most people do not have symptoms of the condition, this blood testing (lipid profile) is done on adults age 20 and older and is repeated every 5 years. This test checks:  · Total cholesterol. This measures the total amount of cholesterol in your blood, including LDL cholesterol, HDL cholesterol, and triglycerides. A healthy number is below 200.  · LDL cholesterol. The target number for LDL cholesterol is different for each person, depending on individual risk factors. Ask your health care provider what your LDL cholesterol should be.  · HDL cholesterol. An HDL level of 60 or higher is best because it helps to protect  against heart disease. A number below 40 for men or below 50 for women increases the risk for heart disease.  · Triglycerides. A healthy triglyceride number is below 150.  If your lipid profile is abnormal, your health care provider may do other blood tests.  How is this treated?  Treatment depends on the type of dyslipidemia that you have and your other risk factors for heart disease and stroke. Your health care provider will have a target range for your lipid levels based on this information.  For many people, this condition may be treated by lifestyle changes, such as diet and exercise. Your health care provider may recommend that you:  · Get regular exercise.  · Make changes to your diet.  · Quit smoking if you smoke.  If diet changes and exercise do not help you reach your goals, your health care provider may also prescribe medicine to lower lipids. The most commonly prescribed type of medicine lowers your LDL cholesterol (statin drug). If you have a high triglyceride level, your provider may prescribe another type of drug (fibrate) or an omega-3 fish oil supplement, or both.  Follow these instructions at home:    Eating and drinking  · Follow instructions from your health care provider or dietitian about eating or drinking restrictions.  · Eat a healthy diet as told by your health care provider. This can help you reach and maintain a healthy weight, lower your LDL cholesterol, and raise your HDL cholesterol. This may include:  ? Limiting your calories, if you are overweight.  ? Eating more fruits, vegetables, whole grains, fish, and lean meats.  ? Limiting saturated fat, trans fat, and cholesterol.  · If you drink alcohol:  ? Limit how much you use.  ? Be aware of how much alcohol is in your drink. In the [place] one drink equals one 12 oz bottle of beer (355 mL), one 5 oz glass of wine (148 mL), or one 1½ oz glass of hard liquor (44 mL).  · Do not drink alcohol if:  ? Your health care provider tells you not to  drink.  ? You are pregnant, may be pregnant, or are planning to become pregnant.  Activity  · Get regular exercise. Start an exercise and strength training program as told by your health care provider. Ask your health care provider what activities are safe for you. Your health care provider may recommend:  ? 30 minutes of aerobic activity 4-6 days a week. Brisk walking is an example of aerobic activity.  ? Strength training 2 days a week.  General instructions  · Do not use any products that contain nicotine or tobacco, such as cigarettes, e-cigarettes, and chewing tobacco. If you need help quitting, ask your health care provider.  · Take over-the-counter and prescription medicines only as told by your health care provider. This includes supplements.  · Keep all follow-up visits as told by your health care provider.  Contact a health care provider if:  · You are:  ? Having trouble sticking to your exercise or diet plan.  ? Struggling to quit smoking or control your use of alcohol.  Summary  · Dyslipidemia often involves a high level of cholesterol or triglycerides, which are types of lipids.  · Treatment depends on the type of dyslipidemia that you have and your other risk factors for heart disease and stroke.  · For many people, treatment starts with lifestyle changes, such as diet and exercise.  · Your health care provider may prescribe medicine to lower lipids.  This information is not intended to replace advice given to you by your health care provider. Make sure you discuss any questions you have with your health care provider.  Document Revised: 08/12/2019 Document Reviewed: 07/19/2019  [employer] Patient Education © 2021 [employer]    Preventing High Cholesterol  Cholesterol is a white, waxy substance similar to fat that the human body needs to help build cells. The liver makes all the cholesterol that a person's body needs. Having high cholesterol (hypercholesterolemia) increases your risk for heart disease  and stroke. Extra or excess cholesterol comes from the food that you eat.  High cholesterol can often be prevented with diet and lifestyle changes. If you already have high cholesterol, you can control it with diet, lifestyle changes, and medicines.  How can high cholesterol affect me?  If you have high cholesterol, fatty deposits (plaques) may build up on the walls of your blood vessels. The blood vessels that carry blood away from your heart are called arteries. Plaques make the arteries narrower and stiffer. This in turn can:  · Restrict or block blood flow and cause blood clots to form.  · Increase your risk for heart attack and stroke.  What can increase my risk for high cholesterol?  This condition is more likely to develop in people who:  · Eat foods that are high in saturated fat or cholesterol. Saturated fat is mostly found in foods that come from animal sources.  · Are overweight.  · Are not getting enough exercise.  · Have a family history of high cholesterol (familial hypercholesterolemia).  What actions can I take to prevent this?  Nutrition    · Eat less saturated fat.  · Avoid trans fats (partially hydrogenated oils). These are often found in margarine and in some baked goods, fried foods, and snacks bought in packages.  · Avoid precooked or cured meat, such as barreto, sausages, or meat loaves.  · Avoid foods and drinks that have added sugars.  · Eat more fruits, vegetables, and whole grains.  · Choose healthy sources of protein, such as fish, poultry, lean cuts of red meat, beans, peas, lentils, and nuts.  · Choose healthy sources of fat, such as:  ? Nuts.  ? Vegetable oils, especially olive oil.  ? Fish that have healthy fats, such as omega-3 fatty acids. These fish include mackerel or salmon.    Lifestyle  · Lose weight if you are overweight. Maintaining a healthy body mass index (BMI) can help prevent or control high cholesterol. It can also lower your risk for diabetes and high blood pressure. Ask  your health care provider to help you with a diet and exercise plan to lose weight safely.  · Do not use any products that contain nicotine or tobacco, such as cigarettes, e-cigarettes, and chewing tobacco. If you need help quitting, ask your health care provider.  Alcohol use  · Do not drink alcohol if:  ? Your health care provider tells you not to drink.  ? You are pregnant, may be pregnant, or are planning to become pregnant.  · If you drink alcohol:  ? Limit how much you use to:  § 0-1 drink a day for women.  § 0-2 drinks a day for men.  ? Be aware of how much alcohol is in your drink. In the [place] one drink equals one 12 oz bottle of beer (355 mL), one 5 oz glass of wine (148 mL), or one 1½ oz glass of hard liquor (44 mL).  Activity    · Get enough exercise. Do exercises as told by your health care provider.  · Each week, do at least 150 minutes of exercise that takes a medium level of effort (moderate-intensity exercise). This kind of exercise:  ? Makes your heart beat faster while allowing you to still be able to talk.  ? Can be done in short sessions several times a day or longer sessions a few times a week. For example, on 5 days each week, you could walk fast or ride your bike 3 times a day for 10 minutes each time.    Medicines  · Your health care provider may recommend medicines to help lower cholesterol. This may be a medicine to lower the amount of cholesterol that your liver makes. You may need medicine if:  ? Diet and lifestyle changes have not lowered your cholesterol enough.  ? You have high cholesterol and other risk factors for heart disease or stroke.  · Take over-the-counter and prescription medicines only as told by your health care provider.  General information  · Manage your risk factors for high cholesterol. Talk with your health care provider about all your risk factors and how to lower your risk.  · Manage other conditions that you have, such as diabetes or high blood pressure  "(hypertension).  · Have blood tests to check your cholesterol levels at regular points in time as told by your health care provider.  · Keep all follow-up visits as told by your health care provider. This is important.  Where to find more information  · American Heart Association: www.heart.org  · National Heart, Lung, and Blood [place]: www.nhlbi.nih.gov  Summary  · High cholesterol increases your risk for heart disease and stroke. By keeping your cholesterol level low, you can reduce your risk for these conditions.  · High cholesterol can often be prevented with diet and lifestyle changes.  · Work with your health care provider to manage your risk factors, and have your blood tested regularly.  This information is not intended to replace advice given to you by your health care provider. Make sure you discuss any questions you have with your health care provider.  Document Revised: 09/29/2020 Document Reviewed: 09/29/2020  [employer] Patient Education © 2021 [employer]    https://www.nhlbi.nih.gov/files/docs/public/heart/dash_brief.pdf\">   DASH Eating Plan  DASH stands for Dietary Approaches to Stop Hypertension. The DASH eating plan is a healthy eating plan that has been shown to:  · Reduce high blood pressure (hypertension).  · Reduce your risk for type 2 diabetes, heart disease, and stroke.  · Help with weight loss.  What are tips for following this plan?  Reading food labels  · Check food labels for the amount of salt (sodium) per serving. Choose foods with less than 5 percent of the Daily Value of sodium. Generally, foods with less than 300 milligrams (mg) of sodium per serving fit into this eating plan.  · To find whole grains, look for the word \"whole\" as the first word in the ingredient list.  Shopping  · Buy products labeled as \"low-sodium\" or \"no salt added.\"  · Buy fresh foods. Avoid canned foods and pre-made or frozen meals.  Cooking  · Avoid adding salt when cooking. Use salt-free seasonings or " herbs instead of table salt or sea salt. Check with your health care provider or pharmacist before using salt substitutes.  · Do not longoria foods. Cook foods using healthy methods such as baking, boiling, grilling, roasting, and broiling instead.  · Cook with heart-healthy oils, such as olive, canola, avocado, soybean, or sunflower oil.  Meal planning    · Eat a balanced diet that includes:  ? 4 or more servings of fruits and 4 or more servings of vegetables each day. Try to fill one-half of your plate with fruits and vegetables.  ? 6-8 servings of whole grains each day.  ? Less than 6 oz (170 g) of lean meat, poultry, or fish each day. A 3-oz (85-g) serving of meat is about the same size as a deck of cards. One egg equals 1 oz (28 g).  ? 2-3 servings of low-fat dairy each day. One serving is 1 cup (237 mL).  ? 1 serving of nuts, seeds, or beans 5 times each week.  ? 2-3 servings of heart-healthy fats. Healthy fats called omega-3 fatty acids are found in foods such as walnuts, flaxseeds, fortified milks, and eggs. These fats are also found in cold-water fish, such as sardines, salmon, and mackerel.  · Limit how much you eat of:  ? Canned or prepackaged foods.  ? Food that is high in trans fat, such as some fried foods.  ? Food that is high in saturated fat, such as fatty meat.  ? Desserts and other sweets, sugary drinks, and other foods with added sugar.  ? Full-fat dairy products.  · Do not salt foods before eating.  · Do not eat more than 4 egg yolks a week.  · Try to eat at least 2 vegetarian meals a week.  · Eat more home-cooked food and less restaurant, buffet, and fast food.    Lifestyle  · When eating at a restaurant, ask that your food be prepared with less salt or no salt, if possible.  · If you drink alcohol:  ? Limit how much you use to:  § 0-1 drink a day for women who are not pregnant.  § 0-2 drinks a day for men.  ? Be aware of how much alcohol is in your drink. In the [place] one drink equals one 12 oz  bottle of beer (355 mL), one 5 oz glass of wine (148 mL), or one 1½ oz glass of hard liquor (44 mL).  General information  · Avoid eating more than 2,300 mg of salt a day. If you have hypertension, you may need to reduce your sodium intake to 1,500 mg a day.  · Work with your health care provider to maintain a healthy body weight or to lose weight. Ask what an ideal weight is for you.  · Get at least 30 minutes of exercise that causes your heart to beat faster (aerobic exercise) most days of the week. Activities may include walking, swimming, or biking.  · Work with your health care provider or dietitian to adjust your eating plan to your individual calorie needs.  What foods should I eat?  Fruits  All fresh, dried, or frozen fruit. Canned fruit in natural juice (without added sugar).  Vegetables  Fresh or frozen vegetables (raw, steamed, roasted, or grilled). Low-sodium or reduced-sodium tomato and vegetable juice. Low-sodium or reduced-sodium tomato sauce and tomato paste. Low-sodium or reduced-sodium canned vegetables.  Grains  Whole-grain or whole-wheat bread. Whole-grain or whole-wheat pasta. Brown rice. Oatmeal. Quinoa. Bulgur. Whole-grain and low-sodium cereals. Mellisa bread. Low-fat, low-sodium crackers. Whole-wheat flour tortillas.  Meats and other proteins  Skinless chicken or turkey. Ground chicken or turkey. Pork with fat trimmed off. Fish and seafood. Egg whites. Dried beans, peas, or lentils. Unsalted nuts, nut butters, and seeds. Unsalted canned beans. Lean cuts of beef with fat trimmed off. Low-sodium, lean precooked or cured meat, such as sausages or meat loaves.  Dairy  Low-fat (1%) or fat-free (skim) milk. Reduced-fat, low-fat, or fat-free cheeses. Nonfat, low-sodium ricotta or cottage cheese. Low-fat or nonfat yogurt. Low-fat, low-sodium cheese.  Fats and oils  Soft margarine without trans fats. Vegetable oil. Reduced-fat, low-fat, or light mayonnaise and salad dressings (reduced-sodium). Canola,  safflower, olive, avocado, soybean, and sunflower oils. Avocado.  Seasonings and condiments  Herbs. Spices. Seasoning mixes without salt.  Other foods  Unsalted popcorn and pretzels. Fat-free sweets.  The items listed above may not be a complete list of foods and beverages you can eat. Contact a dietitian for more information.  What foods should I avoid?  Fruits  Canned fruit in a light or heavy syrup. Fried fruit. Fruit in cream or butter sauce.  Vegetables  Creamed or fried vegetables. Vegetables in a cheese sauce. Regular canned vegetables (not low-sodium or reduced-sodium). Regular canned tomato sauce and paste (not low-sodium or reduced-sodium). Regular tomato and vegetable juice (not low-sodium or reduced-sodium). Pickles. Olives.  Grains  Baked goods made with fat, such as croissants, muffins, or some breads. Dry pasta or rice meal packs.  Meats and other proteins  Fatty cuts of meat. Ribs. Fried meat. De Jesus. Bologna, salami, and other precooked or cured meats, such as sausages or meat loaves. Fat from the back of a pig (fatback). Bratwurst. Salted nuts and seeds. Canned beans with added salt. Canned or smoked fish. Whole eggs or egg yolks. Chicken or turkey with skin.  Dairy  Whole or 2% milk, cream, and half-and-half. Whole or full-fat cream cheese. Whole-fat or sweetened yogurt. Full-fat cheese. Nondairy creamers. Whipped toppings. Processed cheese and cheese spreads.  Fats and oils  Butter. Stick margarine. Lard. Shortening. Ghee. De Jesus fat. Tropical oils, such as coconut, palm kernel, or palm oil.  Seasonings and condiments  Onion salt, garlic salt, seasoned salt, table salt, and sea salt. Worcestershire sauce. Tartar sauce. Barbecue sauce. Teriyaki sauce. Soy sauce, including reduced-sodium. Steak sauce. Canned and packaged gravies. Fish sauce. Oyster sauce. Cocktail sauce. Store-bought horseradish. Ketchup. Mustard. Meat flavorings and tenderizers. Bouillon cubes. Hot sauces. Pre-made or packaged  marinades. Pre-made or packaged taco seasonings. Relishes. Regular salad dressings.  Other foods  Salted popcorn and pretzels.  The items listed above may not be a complete list of foods and beverages you should avoid. Contact a dietitian for more information.  Where to find more information  · National Heart, Lung, and Blood [place]: www.nhlbi.nih.gov  · American Heart Association: www.heart.org  · Academy of Nutrition and Dietetics: www.eatright.org  · National Kidney Foundation: www.kidney.org  Summary  · The DASH eating plan is a healthy eating plan that has been shown to reduce high blood pressure (hypertension). It may also reduce your risk for type 2 diabetes, heart disease, and stroke.  · When on the DASH eating plan, aim to eat more fresh fruits and vegetables, whole grains, lean proteins, low-fat dairy, and heart-healthy fats.  · With the DASH eating plan, you should limit salt (sodium) intake to 2,300 mg a day. If you have hypertension, you may need to reduce your sodium intake to 1,500 mg a day.  · Work with your health care provider or dietitian to adjust your eating plan to your individual calorie needs.  This information is not intended to replace advice given to you by your health care provider. Make sure you discuss any questions you have with your health care provider.  Document Revised: 11/20/2020 Document Reviewed: 11/20/2020  [employer] Patient Education © 2021 [employer]    Healthy Eating  Following a healthy eating pattern may help you to achieve and maintain a healthy body weight, reduce the risk of chronic disease, and live a long and productive life. It is important to follow a healthy eating pattern at an appropriate calorie level for your body. Your nutritional needs should be met primarily through food by choosing a variety of nutrient-rich foods.  What are tips for following this plan?  Reading food labels  · Read labels and choose the following:  ? Reduced or low sodium.  ? Juices  "with 100% fruit juice.  ? Foods with low saturated fats and high polyunsaturated and monounsaturated fats.  ? Foods with whole grains, such as whole wheat, cracked wheat, brown rice, and wild rice.  ? Whole grains that are fortified with folic acid. This is recommended for women who are pregnant or who want to become pregnant.  · Read labels and avoid the following:  ? Foods with a lot of added sugars. These include foods that contain brown sugar, corn sweetener, corn syrup, dextrose, fructose, glucose, high-fructose corn syrup, honey, invert sugar, lactose, malt syrup, maltose, molasses, raw sugar, sucrose, trehalose, or turbinado sugar.  § Do not eat more than the following amounts of added sugar per day:  § 6 teaspoons (25 g) for women.  § 9 teaspoons (38 g) for men.  ? Foods that contain processed or refined starches and grains.  ? Refined grain products, such as white flour, degermed cornmeal, white bread, and white rice.  Shopping  · Choose nutrient-rich snacks, such as vegetables, whole fruits, and nuts. Avoid high-calorie and high-sugar snacks, such as potato chips, fruit snacks, and candy.  · Use oil-based dressings and spreads on foods instead of solid fats such as butter, stick margarine, or cream cheese.  · Limit pre-made sauces, mixes, and \"instant\" products such as flavored rice, instant noodles, and ready-made pasta.  · Try more plant-protein sources, such as tofu, tempeh, black beans, edamame, lentils, nuts, and seeds.  · Explore eating plans such as the Mediterranean diet or vegetarian diet.  Cooking  · Use oil to sauté or stir-longoria foods instead of solid fats such as butter, stick margarine, or lard.  · Try baking, boiling, grilling, or broiling instead of frying.  · Remove the fatty part of meats before cooking.  · Steam vegetables in water or broth.  Meal planning    · At meals, imagine dividing your plate into fourths:  ? One-half of your plate is fruits and vegetables.  ? One-fourth of your " plate is whole grains.  ? One-fourth of your plate is protein, especially lean meats, poultry, eggs, tofu, beans, or nuts.  · Include low-fat dairy as part of your daily diet.    Lifestyle  · Choose healthy options in all settings, including home, work, school, restaurants, or stores.  · Prepare your food safely:  ? Wash your hands after handling raw meats.  ? Keep food preparation surfaces clean by regularly washing with hot, soapy water.  ? Keep raw meats separate from ready-to-eat foods, such as fruits and vegetables.  ? , meat, poultry, and eggs to the recommended internal temperature.  ? Store foods at safe temperatures. In general:  § Keep cold foods at 40°F (4.4°C) or below.  § Keep hot foods at 140°F (60°C) or above.  § Keep your freezer at 0°F (-17.8°C) or below.  § Foods are no longer safe to eat when they have been between the temperatures of 40°-140°F (4.4-60°C) for more than 2 hours.  What foods should I eat?  Fruits  Aim to eat 2 cup-equivalents of fresh, canned (in natural juice), or frozen fruits each day. Examples of 1 cup-equivalent of fruit include 1 small apple, 8 large strawberries, 1 cup canned fruit, ½ cup dried fruit, or 1 cup 100% juice.  Vegetables  Aim to eat 2½-3 cup-equivalents of fresh and frozen vegetables each day, including different varieties and colors. Examples of 1 cup-equivalent of vegetables include 2 medium carrots, 2 cups raw, leafy greens, 1 cup chopped vegetable (raw or cooked), or 1 medium baked potato.  Grains  Aim to eat 6 ounce-equivalents of whole grains each day. Examples of 1 ounce-equivalent of grains include 1 slice of bread, 1 cup ready-to-eat cereal, 3 cups popcorn, or ½ cup cooked rice, pasta, or cereal.  Meats and other proteins  Aim to eat 5-6 ounce-equivalents of protein each day. Examples of 1 ounce-equivalent of protein include 1 egg, 1/2 cup nuts or seeds, or 1 tablespoon (16 g) peanut butter. A cut of meat or fish that is the size of a deck of  cards is about 3-4 ounce-equivalents.  · Of the protein you eat each week, try to have at least 8 ounces come from seafood. This includes salmon, trout, herring, and anchovies.  Dairy  Aim to eat 3 cup-equivalents of fat-free or low-fat dairy each day. Examples of 1 cup-equivalent of dairy include 1 cup (240 mL) milk, 8 ounces (250 g) yogurt, 1½ ounces (44 g) natural cheese, or 1 cup (240 mL) fortified soy milk.  Fats and oils  · Aim for about 5 teaspoons (21 g) per day. Choose monounsaturated fats, such as canola and olive oils, avocados, peanut butter, and most nuts, or polyunsaturated fats, such as sunflower, corn, and soybean oils, walnuts, pine nuts, sesame seeds, sunflower seeds, and flaxseed.  Beverages  · Aim for six 8-oz glasses of water per day. Limit coffee to three to five 8-oz cups per day.  · Limit caffeinated beverages that have added calories, such as soda and energy drinks.  · Limit alcohol intake to no more than 1 drink a day for nonpregnant women and 2 drinks a day for men. One drink equals 12 oz of beer (355 mL), 5 oz of wine (148 mL), or 1½ oz of hard liquor (44 mL).  Seasoning and other foods  · Avoid adding excess amounts of salt to your foods. Try flavoring foods with herbs and spices instead of salt.  · Avoid adding sugar to foods.  · Try using oil-based dressings, sauces, and spreads instead of solid fats.  This information is based on general [place] nutrition guidelines. For more information, visit choosemyplate.gov. Exact amounts may vary based on your nutrition needs.  Summary  · A healthy eating plan may help you to maintain a healthy weight, reduce the risk of chronic diseases, and stay active throughout your life.  · Plan your meals. Make sure you eat the right portions of a variety of nutrient-rich foods.  · Try baking, boiling, grilling, or broiling instead of frying.  · Choose healthy options in all settings, including home, work, school, restaurants, or stores.  This information is  not intended to replace advice given to you by your health care provider. Make sure you discuss any questions you have with your health care provider.  Document Revised: 04/01/2019 Document Reviewed: 04/01/2019  [employer] Patient Education © 2021 [employer]  Amiodarone tablets  What is this medicine?  AMIODARONE (a BECCA oh da jackie) is an antiarrhythmic drug. It helps make your heart beat regularly. Because of the side effects caused by this medicine, it is only used when other medicines have not worked. It is usually used for heartbeat problems that may be life threatening.  This medicine may be used for other purposes; ask your health care provider or pharmacist if you have questions.  COMMON BRAND NAME(S): Cordarone, Pacerone  What should I tell my health care provider before I take this medicine?  They need to know if you have any of these conditions:  · liver disease  · lung disease  · other heart problems  · thyroid disease  · an unusual or allergic reaction to amiodarone, iodine, other medicines, foods, dyes, or preservatives  · pregnant or trying to get pregnant  · breast-feeding  How should I use this medicine?  Take this medicine by mouth with a glass of water. Follow the directions on the prescription label. You can take this medicine with or without food. However, you should always take it the same way each time. Take your doses at regular intervals. Do not take your medicine more often than directed. Do not stop taking except on the advice of your doctor or health care professional.  A special MedGuide will be given to you by the pharmacist with each prescription and refill. Be sure to read this information carefully each time.  Talk to your pediatrician regarding the use of this medicine in children. Special care may be needed.  Overdosage: If you think you have taken too much of this medicine contact a poison control center or emergency room at once.  NOTE: This medicine is only for you. Do not share  this medicine with others.  What if I miss a dose?  If you miss a dose, take it as soon as you can. If it is almost time for your next dose, take only that dose. Do not take double or extra doses.  What may interact with this medicine?  Do not take this medicine with any of the following medications:  · abarelix  · apomorphine  · arsenic trioxide  · certain antibiotics like erythromycin, gemifloxacin, levofloxacin, pentamidine  · certain medicines for depression like amoxapine, tricyclic antidepressants  · certain medicines for fungal infections like fluconazole, itraconazole, ketoconazole, posaconazole, voriconazole  · certain medicines for irregular heart beat like disopyramide, dronedarone, ibutilide, propafenone, sotalol  · certain medicines for malaria like chloroquine, halofantrine  · cisapride  · droperidol  · haloperidol  · hawthorn  · maprotiline  · methadone  · phenothiazines like chlorpromazine, mesoridazine, thioridazine  · pimozide  · ranolazine  · red yeast rice  · vardenafil  This medicine may also interact with the following medications:  · antiviral medicines for HIV or AIDS  · certain medicines for blood pressure, heart disease, irregular heart beat  · certain medicines for cholesterol like atorvastatin, cerivastatin, lovastatin, simvastatin  · certain medicines for hepatitis C like sofosbuvir and ledipasvir; sofosbuvir  · certain medicines for seizures like phenytoin  · certain medicines for thyroid problems  · certain medicines that treat or prevent blood clots like warfarin  · cholestyramine  · cimetidine  · clopidogrel  · cyclosporine  · dextromethorphan  · diuretics  · dofetilide  · fentanyl  · general anesthetics  · grapefruit juice  · lidocaine  · loratadine  · methotrexate  · other medicines that prolong the QT interval (cause an abnormal heart rhythm)  · procainamide  · quinidine  · rifabutin, rifampin, or rifapentine  · Noel's Wort  · trazodone  · ziprasidone  This list may not  describe all possible interactions. Give your health care provider a list of all the medicines, herbs, non-prescription drugs, or dietary supplements you use. Also tell them if you smoke, drink alcohol, or use illegal drugs. Some items may interact with your medicine.  What should I watch for while using this medicine?  Your condition will be monitored closely when you first begin therapy. Often, this drug is first started in a hospital or other monitored health care setting. Once you are on maintenance therapy, visit your doctor or health care professional for regular checks on your progress. Because your condition and use of this medicine carry some risk, it is a good idea to carry an identification card, necklace or bracelet with details of your condition, medications, and doctor or health care professional.  You may get drowsy or dizzy. Do not drive, use machinery, or do anything that needs mental alertness until you know how this medicine affects you. Do not stand or sit up quickly, especially if you are an older patient. This reduces the risk of dizzy or fainting spells.  This medicine can make you more sensitive to the sun. Keep out of the sun. If you cannot avoid being in the sun, wear protective clothing and use sunscreen. Do not use sun lamps or tanning beds/booths.  You should have regular eye exams before and during treatment. Call your doctor if you have blurred vision, see halos, or your eyes become sensitive to light. Your eyes may get dry. It may be helpful to use a lubricating eye solution or artificial tears solution.  If you are going to have surgery or a procedure that requires contrast dyes, tell your doctor or health care professional that you are taking this medicine.  What side effects may I notice from receiving this medicine?  Side effects that you should report to your doctor or health care professional as soon as possible:  · allergic reactions like skin rash, itching or hives, swelling  of the face, lips, or tongue  · blue-gray coloring of the skin  · blurred vision, seeing blue green halos, increased sensitivity of the eyes to light  · breathing problems  · chest pain  · dark urine  · fast, irregular heartbeat  · feeling faint or light-headed  · intolerance to heat or cold  · nausea or vomiting  · pain and swelling of the scrotum  · pain, tingling, numbness in feet, hands  · redness, blistering, peeling or loosening of the skin, including inside the mouth  · spitting up blood  · stomach pain  · sweating  · unusual or uncontrolled movements of body  · unusually weak or tired  · weight gain or loss  · yellowing of the eyes or skin  Side effects that usually do not require medical attention (report to your doctor or health care professional if they continue or are bothersome):  · change in sex drive or performance  · constipation  · dizziness  · headache  · loss of appetite  · trouble sleeping  This list may not describe all possible side effects. Call your doctor for medical advice about side effects. You may report side effects to FDA at 0-098-FDA-2118.  Where should I keep my medicine?  Keep out of the reach of children.  Store at room temperature between 20 and 25 degrees C (68 and 77 degrees F). Protect from light. Keep container tightly closed. Throw away any unused medicine after the expiration date.  NOTE: This sheet is a summary. It may not cover all possible information. If you have questions about this medicine, talk to your doctor, pharmacist, or health care provider.  © 2021 [employer] Standard (2019-11-20 13:44:04)  Apixaban oral tablets  What is this medicine?  APIXABAN (a PIX a ban) is an anticoagulant (blood thinner). It is used to lower the chance of stroke in people with a medical condition called atrial fibrillation. It is also used to treat or prevent blood clots in the lungs or in the veins.  This medicine may be used for other purposes; ask your health care provider or  pharmacist if you have questions.  COMMON BRAND NAME(S): Eliquis  What should I tell my health care provider before I take this medicine?  They need to know if you have any of these conditions:  · antiphospholipid antibody syndrome  · bleeding disorders  · bleeding in the brain  · blood in your stools (black or tarry stools) or if you have blood in your vomit  · history of blood clots  · history of stomach bleeding  · kidney disease  · liver disease  · mechanical heart valve  · an unusual or allergic reaction to apixaban, other medicines, foods, dyes, or preservatives  · pregnant or trying to get pregnant  · breast-feeding  How should I use this medicine?  Take this medicine by mouth with a glass of water. Follow the directions on the prescription label. You can take it with or without food. If it upsets your stomach, take it with food. Take your medicine at regular intervals. Do not take it more often than directed. Do not stop taking except on your doctor's advice. Stopping this medicine may increase your risk of a blood clot. Be sure to refill your prescription before you run out of medicine.  Talk to your pediatrician regarding the use of this medicine in children. Special care may be needed.  Overdosage: If you think you have taken too much of this medicine contact a poison control center or emergency room at once.  NOTE: This medicine is only for you. Do not share this medicine with others.  What if I miss a dose?  If you miss a dose, take it as soon as you can. If it is almost time for your next dose, take only that dose. Do not take double or extra doses.  What may interact with this medicine?  This medicine may interact with the following:  · aspirin and aspirin-like medicines  · certain medicines for fungal infections like ketoconazole and itraconazole  · certain medicines for seizures like carbamazepine and phenytoin  · certain medicines that treat or prevent blood clots like warfarin, enoxaparin, and  dalteparin  · clarithromycin  · NSAIDs, medicines for pain and inflammation, like ibuprofen or naproxen  · rifampin  · ritonavir  · Temitope's wort  This list may not describe all possible interactions. Give your health care provider a list of all the medicines, herbs, non-prescription drugs, or dietary supplements you use. Also tell them if you smoke, drink alcohol, or use illegal drugs. Some items may interact with your medicine.  What should I watch for while using this medicine?  Visit your healthcare professional for regular checks on your progress. You may need blood work done while you are taking this medicine. Your condition will be monitored carefully while you are receiving this medicine. It is important not to miss any appointments.  Avoid sports and activities that might cause injury while you are using this medicine. Severe falls or injuries can cause unseen bleeding. Be careful when using sharp tools or knives. Consider using an electric razor. Take special care brushing or flossing your teeth. Report any injuries, bruising, or red spots on the skin to your healthcare professional.  If you are going to need surgery or other procedure, tell your healthcare professional that you are taking this medicine.  Wear a medical ID bracelet or chain. Carry a card that describes your disease and details of your medicine and dosage times.  What side effects may I notice from receiving this medicine?  Side effects that you should report to your doctor or health care professional as soon as possible:  · allergic reactions like skin rash, itching or hives, swelling of the face, lips, or tongue  · signs and symptoms of bleeding such as bloody or black, tarry stools; red or dark-brown urine; spitting up blood or brown material that looks like coffee grounds; red spots on the skin; unusual bruising or bleeding from the eye, gums, or nose  · signs and symptoms of a blood clot such as chest pain; shortness of breath; pain,  swelling, or warmth in the leg  · signs and symptoms of a stroke such as changes in vision; confusion; trouble speaking or understanding; severe headaches; sudden numbness or weakness of the face, arm or leg; trouble walking; dizziness; loss of coordination  This list may not describe all possible side effects. Call your doctor for medical advice about side effects. You may report side effects to FDA at 4-064-BZT-7670.  Where should I keep my medicine?  Keep out of the reach of children.  Store at room temperature between 20 and 25 degrees C (68 and 77 degrees F). Throw away any unused medicine after the expiration date.  NOTE: This sheet is a summary. It may not cover all possible information. If you have questions about this medicine, talk to your doctor, pharmacist, or health care provider.  © 2021 [employer] Standard (2019-08-28 17:39:34)  Aspirin, ASA oral tablets  What is this medicine?  ASPIRIN (AS pir in) is a pain reliever. It is used to treat mild pain and fever. This medicine is also used as directed by a doctor to prevent and to treat heart attacks, to prevent strokes and blood clots, and to treat arthritis or inflammation.  This medicine may be used for other purposes; ask your health care provider or pharmacist if you have questions.  COMMON BRAND NAME(S): Aspir-Low, Aspir-Julianne, Aspirtab, Paras Advanced Aspirin, Paras Aspirin, Paras Aspirin Extra Strength, Paras Aspirin Plus, Paras Extra Strength, Paras Extra Strength Plus, Paras Genuine Aspirin, Paras Womens Aspirin, Bufferin, Bufferin Extra Strength, Bufferin Low Dose  What should I tell my health care provider before I take this medicine?  They need to know if you have any of these conditions:  · anemia  · asthma  · bleeding problems  · child with chickenpox, the flu, or other viral infection  · diabetes  · gout  · if you frequently drink alcohol containing drinks  · kidney disease  · liver disease  · low level of vitamin K  · lupus  · smoke  tobacco  · stomach ulcers or other problems  · an unusual or allergic reaction to aspirin, tartrazine dye, other medicines, dyes, or preservatives  · pregnant or trying to get pregnant  · breast-feeding  How should I use this medicine?  Take this medicine by mouth with a glass of water. Follow the directions on the package or prescription label. You can take this medicine with or without food. If it upsets your stomach, take it with food. Do not take your medicine more often than directed.  Talk to your pediatrician regarding the use of this medicine in children. While this drug may be prescribed for children as young as 12 years of age for selected conditions, precautions do apply. Children and teenagers should not use this medicine to treat chicken pox or flu symptoms unless directed by a doctor.  Patients over 65 years old may have a stronger reaction and need a smaller dose.  Overdosage: If you think you have taken too much of this medicine contact a poison control center or emergency room at once.  NOTE: This medicine is only for you. Do not share this medicine with others.  What if I miss a dose?  If you are taking this medicine on a regular schedule and miss a dose, take it as soon as you can. If it is almost time for your next dose, take only that dose. Do not take double or extra doses.  What may interact with this medicine?  Do not take this medicine with any of the following medications:  · cidofovir  · ketorolac  · probenecid  This medicine may also interact with the following medications:  · alcohol  · alendronate  · bismuth subsalicylate  · flavocoxid  · herbal supplements like feverfew, garlic, romel, ginkgo biloba, horse chestnut  · medicines for diabetes or glaucoma like acetazolamide, methazolamide  · medicines for gout  · medicines that treat or prevent blood clots like enoxaparin, heparin, ticlopidine, warfarin  · other aspirin and aspirin-like medicines  · NSAIDs, medicines for pain and  inflammation, like ibuprofen or naproxen  · pemetrexed  · sulfinpyrazone  · varicella live vaccine  This list may not describe all possible interactions. Give your health care provider a list of all the medicines, herbs, non-prescription drugs, or dietary supplements you use. Also tell them if you smoke, drink alcohol, or use illegal drugs. Some items may interact with your medicine.  What should I watch for while using this medicine?  If you are treating yourself for pain, tell your doctor or health care provider if the pain lasts more than 10 days, if it gets worse, or if there is a new or different kind of pain. Tell your doctor if you see redness or swelling. Also, check with your doctor if you have a fever that lasts for more than 3 days. Only take this medicine to prevent heart attacks or blood clotting if prescribed by your doctor or health care provider.  Do not take other medicines that contain aspirin, ibuprofen, or naproxen with this medicine. Side effects such as stomach upset, nausea, or ulcers may be more likely to occur. Many non-prescription medicines contain aspirin, ibuprofen, or naproxen. Always read labels carefully.  This medicine can cause serious ulcers and bleeding in the stomach. It can happen with no warning. Smoking, drinking alcohol, older age, and poor health can also increase risks. Call your health care provider right away if you have stomach pain or blood in your vomit or stool.  This medicine does not prevent a heart attack or stroke. This medicine may increase the chance of a heart attack or stroke. The chance may increase the longer you use this medicine or if you have heart disease. If you take aspirin to prevent a heart attack or stroke, talk to your health care provider about using this medicine.  Alcohol may interfere with the effect of this medicine. Avoid alcoholic drinks.  This medicine may cause serious skin reactions. They can happen weeks to months after starting the  medicine. Contact your health care provider right away if you notice fevers or flu-like symptoms with a rash. The rash may be red or purple and then turn into blisters or peeling of the skin. Or, you might notice a red rash with swelling of the face, lips or lymph nodes in your neck or under your arms.  Talk to your health care provider if you are pregnant before taking this medicine. Taking this medicine between weeks 20 and 30 of pregnancy may harm your unborn baby. Your health care provider will monitor you closely if you need to take it. After 30 weeks of pregnancy, do not take this medicine.  You may get drowsy or dizzy. Do not drive, use machinery, or do anything that needs mental alertness until you know how this medicine affects you. Do not stand up or sit up quickly, especially if you are an older patient. This reduces the risk of dizzy or fainting spells.  Be careful brushing or flossing your teeth or using a toothpick because you may get an infection or bleed more easily. If you have any dental work done, tell your dentist you are receiving this medicine.  This medicine may make it more difficult to get pregnant. Talk to your health care provider if you are concerned about your fertility.  What side effects may I notice from receiving this medicine?  Side effects that you should report to your doctor or health care provider as soon as possible:  · allergic reactions (skin rash, itching or hives; swelling of the face, lips, or tongue)  · bleeding (bloody or black, tarry stools; red or dark brown urine; spitting up blood or brown material that looks like coffee grounds; red spots on the skin; unusual bruising or bleeding from the eyes, gums, or nose)  · blood clot (chest pain; shortness of breath; pain, swelling, or warmth in the leg)  · blurred vision OR changes in vision  · heart failure (trouble breathing; fast, irregular heartbeat; sudden weight gain; swelling of the ankles, feet, hands; unusually weak  or tired)  · heart attack (trouble breathing; pain or tightness in the chest, neck, back or arms; unusually weak or tired)  · kidney injury (trouble passing urine or change in the amount of urine)  · light-colored stool  · liver injury (dark yellow or brown urine; general ill feeling or flu-like symptoms; loss of appetite, right upper belly pain; unusually weak or tired, yellowing of the eyes or skin)  · low red blood cell counts (trouble breathing; feeling faint; lightheaded, falls; unusually weak or tired)  · rash, fever, and swollen lymph nodes  · redness, blistering, peeling, or loosening of the skin, including inside the mouth  · stroke (changes in vision; confusion; trouble speaking or understanding; severe headaches; sudden numbness or weakness of the face, arm or leg; trouble walking; dizziness; loss of balance or coordination)  · trouble breathing  Side effects that usually do not require medical attention (report to your doctor or health care professional if they continue or are bothersome):  · constipation  · diarrhea  · dizziness  · headache  · nausea, vomiting  · passing gas  This list may not describe all possible side effects. Call your doctor for medical advice about side effects. You may report side effects to FDA at 3-926-FDA-3169.  Where should I keep my medicine?  Keep out of the reach of children and pets.  Store at room temperature between 15 and 30 degrees C (59 and 86 degrees F). Protect from heat and moisture. Do not use this medicine if it has a strong vinegar smell. Throw away any unused medicine after the expiration date.  Get rid of any unused medicine after the expiration date.  To get rid of medicines that are no longer needed or have :  · Take the medicine to a medicine take-back program. Check with your pharmacy or law enforcement to find a location.  · If you cannot return the medicine, check the label or package insert to see if the medicine should be thrown out in the  garbage or flushed down the toilet. If you are not sure, ask your health care provider. If it is safe to put it in the trash, empty the medicine out of the container. Mix the medicine with cat litter, dirt, coffee grounds, or other unwanted substance. Seal the mixture in a bag or container. Put it in the trash.  NOTE: This sheet is a summary. It may not cover all possible information. If you have questions about this medicine, talk to your doctor, pharmacist, or health care provider.  © 2021 [employer] Standard (2021-05-13 17:13:55)  Clopidogrel tablets  What is this medicine?  CLOPIDOGREL (kloh PID oh grel) helps to prevent blood clots. This medicine is used to prevent heart attack, stroke, or other vascular events in people who are at high risk.  This medicine may be used for other purposes; ask your health care provider or pharmacist if you have questions.  COMMON BRAND NAME(S): Plavix  What should I tell my health care provider before I take this medicine?  They need to know if you have any of the following conditions:  · bleeding disorders  · bleeding in the brain  · having surgery  · history of stomach bleeding  · an unusual or allergic reaction to clopidogrel, other medicines, foods, dyes, or preservatives  · pregnant or trying to get pregnant  · breast-feeding  How should I use this medicine?  Take this medicine by mouth with a glass of water. Follow the directions on the prescription label. You may take this medicine with or without food. If it upsets your stomach, take it with food. Take your medicine at regular intervals. Do not take it more often than directed. Do not stop taking except on your doctor's advice.  A special MedGuide will be given to you by the pharmacist with each prescription and refill. Be sure to read this information carefully each time.  Talk to your pediatrician regarding the use of this medicine in children. Special care may be needed.  Overdosage: If you think you have taken too  much of this medicine contact a poison control center or emergency room at once.  NOTE: This medicine is only for you. Do not share this medicine with others.  What if I miss a dose?  If you miss a dose, take it as soon as you can. If it is almost time for your next dose, take only that dose. Do not take double or extra doses.  What may interact with this medicine?  Do not take this medicine with the following medications:  · dasabuvir; ombitasvir; paritaprevir; ritonavir  · defibrotide  · selexipag  This medicine may also interact with the following medications:  · certain medicines that treat or prevent blood clots like warfarin  · narcotic medicines for pain  · NSAIDs, medicines for pain and inflammation, like ibuprofen or naproxen  · repaglinide  · SNRIs, medicines for depression, like desvenlafaxine, duloxetine, levomilnacipran, venlafaxine  · SSRIs, medicines for depression, like citalopram, escitalopram, fluoxetine, fluvoxamine, paroxetine, sertraline  · stomach acid blockers like cimetidine, esomeprazole, omeprazole  This list may not describe all possible interactions. Give your health care provider a list of all the medicines, herbs, non-prescription drugs, or dietary supplements you use. Also tell them if you smoke, drink alcohol, or use illegal drugs. Some items may interact with your medicine.  What should I watch for while using this medicine?  Visit your doctor or health care professional for regular check-ups. Do not stop taking your medicine unless your doctor tells you to.  Notify your doctor or health care professional and seek emergency treatment if you develop breathing problems; changes in vision; chest pain; severe, sudden headache; pain, swelling, warmth in the leg; trouble speaking; sudden numbness or weakness of the face, arm or leg. These can be signs that your condition has gotten worse.  If you are going to have surgery or dental work, tell your doctor or health care professional that  you are taking this medicine.  Certain genetic factors may reduce the effect of this medicine. Your doctor may use genetic tests to determine treatment.  Only take aspirin if you are instructed to. Low doses of aspirin are used with this medicine to treat some conditions. Taking aspirin with this medicine can increase your risk of bleeding so you must be careful. Talk to your doctor or pharmacist if you have questions.  What side effects may I notice from receiving this medicine?  Side effects that you should report to your doctor or health care professional as soon as possible:  · allergic reactions like skin rash, itching or hives, swelling of the face, lips, or tongue  · signs and symptoms of bleeding such as bloody or black, tarry stools; red or dark-brown urine; spitting up blood or brown material that looks like coffee grounds; red spots on the skin; unusual bruising or bleeding from the eye, gums, or nose  · signs and symptoms of a blood clot such as breathing problems; changes in vision; chest pain; severe, sudden headache; pain, swelling, warmth in the leg; trouble speaking; sudden numbness or weakness of the face, arm or leg  · signs and symptoms of low blood sugar such as feeling anxious; confusion; dizziness; increased hunger; unusually weak or tired; increased sweating; shakiness; cold, clammy skin; irritable; headache; blurred vision; fast heartbeat; loss of consciousness  Side effects that usually do not require medical attention (report to your doctor or health care professional if they continue or are bothersome):  · constipation  · diarrhea  · headache  · upset stomach  This list may not describe all possible side effects. Call your doctor for medical advice about side effects. You may report side effects to FDA at 1-180-FDA-3510.  Where should I keep my medicine?  Keep out of the reach of children.  Store at room temperature of 59 to 86 degrees F (15 to 30 degrees C). Throw away any unused  medicine after the expiration date.  NOTE: This sheet is a summary. It may not cover all possible information. If you have questions about this medicine, talk to your doctor, pharmacist, or health care provider.  © 2021 [employer] Standard (2019-05-20 15:03:38)  Sacubitril; Valsartan Oral Tablets  What is this medicine?  SACUBITRIL; VALSARTAN (sak UE bi tril; afsaneh SELVIN tan) is a combination of a neprilysin inhibitor and a an angiotensin II receptor blocker. It treats heart failure.  This medicine may be used for other purposes; ask your health care provider or pharmacist if you have questions.  COMMON BRAND NAME(S): Entresto  What should I tell my health care provider before I take this medicine?  They need to know if you have any of these conditions:  · diabetes and take a medicine that contains aliskiren  · high levels of potassium in the blood  · kidney disease  · liver disease  · low blood pressure  · an unusual or allergic reaction to sacubitril; valsartan, drugs called angiotensin converting enzyme (ACE) inhibitors, angiotensin II receptor blockers (ARBs), other medicines, foods, dyes, or preservatives  · pregnant or trying to get pregnant  · breast-feeding  How should I use this medicine?  Take this medicine by mouth. Take it as directed on the prescription label at the same time every day. You can take it with or without food. If it upsets your stomach, take it with food. Keep taking it unless your health care provider tells you to stop.  Talk to your health care provider about the use of this drug in children. While it may be prescribed for children as young as 1 for selected conditions, precautions do apply.  Overdosage: If you think you have taken too much of this medicine contact a poison control center or emergency room at once.  NOTE: This medicine is only for you. Do not share this medicine with others.  What if I miss a dose?  If you miss a dose, take it as soon as you can. If it is almost time for  your next dose, take only that dose. Do not take double or extra doses.  What may interact with this medicine?  Do not take this medicine with any of the following medicines:  · aliskiren if you have diabetes  · angiotensin-converting enzyme (ACE) inhibitors, like benazepril, captopril, enalapril, fosinopril, lisinopril, or ramipril  · tranylcypromine  This medicine may also interact with the following medicines:  · angiotensin II receptor blockers (ARBs) like azilsartan, candesartan, eprosartan, irbesartan, losartan, olmesartan, telmisartan, or valsartan  · celecoxib  · lithium  · NSAIDS, medicines for pain and inflammation, like ibuprofen or naproxen  · potassium-sparing diuretics like amiloride, spironolactone, and triamterene  · potassium supplements  This list may not describe all possible interactions. Give your health care provider a list of all the medicines, herbs, non-prescription drugs, or dietary supplements you use. Also tell them if you smoke, drink alcohol, or use illegal drugs. Some items may interact with your medicine.  What should I watch for while using this medicine?  Tell your doctor or health care provider if your symptoms do not start to get better or if they get worse.  Do not become pregnant while taking this medicine. Women should inform their health care provider if they wish to become pregnant or think they might be pregnant. There is a potential for serious harm to an unborn child. Talk to your health care provider for more information.  You may get drowsy or dizzy. Do not drive, use machinery, or do anything that needs mental alertness until you know how this medicine affects you. Do not stand or sit up quickly, especially if you are an older patient. This reduces the risk of dizzy or fainting spells.  Alcohol may interfere with the effects of this medicine. Avoid alcoholic drinks.  Avoid salt substitutes unless you are told otherwise by your health care provider.  What side effects  may I notice from receiving this medicine?  Side effects that you should report to your doctor or health care provider as soon as possible:  · allergic reactions (skin rash, itching or hives; swelling of the face, lips, or tongue)  · high potassium levels (chest pain; fast, irregular heartbeat; muscle weakness)  · kidney injury (trouble passing urine or change in the amount of urine)  · low blood pressure (dizziness; feeling faint or lightheaded, falls; unusually weak or tired)  Side effects that usually do not require medical attention (report to your doctor or health care provider if they continue or are bothersome):  · cough  This list may not describe all possible side effects. Call your doctor for medical advice about side effects. You may report side effects to FDA at 9-761-RWR-5438.  Where should I keep my medicine?  Keep out of the reach of children and pets.  Store at room temperature between 20 and 25 degrees C (68 and 77 degrees F). Protect from moisture. Keep the container tightly closed. Get rid of any unused medicine after the expiration date.  To get rid of medicines that are no longer needed or have :  · Take the medicine to a take-back program. Check with your pharmacy or law enforcement to find a location.  · If you cannot return the medicine, check the label or package insert to see if the medicine should be thrown out in the garbage or flushed down the toilet. If you are not sure, ask your health care provider. If it is safe to put it in the trash, empty the medicine out of the container. Mix the medicine with cat litter, dirt, coffee grounds, or other unwanted substance. Seal the mixture in a bag or container. Put it in the trash.  NOTE: This sheet is a summary. It may not cover all possible information. If you have questions about this medicine, talk to your doctor, pharmacist, or health care provider.  ©  [employer] Standard (2021 11:23:32)  Nitroglycerin sublingual  tablets  What is this medicine?  NITROGLYCERIN (abigail troe GLI ser in) is a type of vasodilator. It relaxes blood vessels, increasing the blood and oxygen supply to your heart. This medicine is used to relieve chest pain caused by angina. It is also used to prevent chest pain before activities like climbing stairs, going outdoors in cold weather, or sexual activity.  This medicine may be used for other purposes; ask your health care provider or pharmacist if you have questions.  COMMON BRAND NAME(S): Nitroquick, Nitrostat, Nitrotab  What should I tell my health care provider before I take this medicine?  They need to know if you have any of these conditions:  · anemia  · head injury, recent stroke, or bleeding in the brain  · liver disease  · previous heart attack  · an unusual or allergic reaction to nitroglycerin, other medicines, foods, dyes, or preservatives  · pregnant or trying to get pregnant  · breast-feeding  How should I use this medicine?  Take this medicine by mouth as needed. At the first sign of an angina attack (chest pain or tightness) place one tablet under your tongue. You can also take this medicine 5 to 10 minutes before an event likely to produce chest pain. Follow the directions on the prescription label. Let the tablet dissolve under the tongue. Do not swallow whole. Replace the dose if you accidentally swallow it. It will help if your mouth is not dry. Saliva around the tablet will help it to dissolve more quickly. Do not eat or drink, smoke or chew tobacco while a tablet is dissolving. If you are not better within 5 minutes after taking ONE dose of nitroglycerin, call 9-1-1 immediately to seek emergency medical care. Do not take more than 3 nitroglycerin tablets over 15 minutes.  If you take this medicine often to relieve symptoms of angina, your doctor or health care professional may provide you with different instructions to manage your symptoms. If symptoms do not go away after following  these instructions, it is important to call 9-1-1 immediately. Do not take more than 3 nitroglycerin tablets over 15 minutes.  Talk to your pediatrician regarding the use of this medicine in children. Special care may be needed.  Overdosage: If you think you have taken too much of this medicine contact a poison control center or emergency room at once.  NOTE: This medicine is only for you. Do not share this medicine with others.  What if I miss a dose?  This does not apply. This medicine is only used as needed.  What may interact with this medicine?  Do not take this medicine with any of the following medications:  · certain migraine medicines like ergotamine and dihydroergotamine (DHE)  · medicines used to treat erectile dysfunction like sildenafil, tadalafil, and vardenafil  · riociguat  This medicine may also interact with the following medications:  · alteplase  · aspirin  · heparin  · medicines for high blood pressure  · medicines for mental depression  · other medicines used to treat angina  · phenothiazines like chlorpromazine, mesoridazine, prochlorperazine, thioridazine  This list may not describe all possible interactions. Give your health care provider a list of all the medicines, herbs, non-prescription drugs, or dietary supplements you use. Also tell them if you smoke, drink alcohol, or use illegal drugs. Some items may interact with your medicine.  What should I watch for while using this medicine?  Tell your doctor or health care professional if you feel your medicine is no longer working.  Keep this medicine with you at all times. Sit or lie down when you take your medicine to prevent falling if you feel dizzy or faint after using it. Try to remain calm. This will help you to feel better faster. If you feel dizzy, take several deep breaths and lie down with your feet propped up, or bend forward with your head resting between your knees.  You may get drowsy or dizzy. Do not drive, use machinery, or do  anything that needs mental alertness until you know how this drug affects you. Do not stand or sit up quickly, especially if you are an older patient. This reduces the risk of dizzy or fainting spells. Alcohol can make you more drowsy and dizzy. Avoid alcoholic drinks.  Do not treat yourself for coughs, colds, or pain while you are taking this medicine without asking your doctor or health care professional for advice. Some ingredients may increase your blood pressure.  What side effects may I notice from receiving this medicine?  Side effects that you should report to your doctor or health care professional as soon as possible:  · blurred vision  · dry mouth  · skin rash  · sweating  · the feeling of extreme pressure in the head  · unusually weak or tired  Side effects that usually do not require medical attention (report to your doctor or health care professional if they continue or are bothersome):  · flushing of the face or neck  · headache  · irregular heartbeat, palpitations  · nausea, vomiting  This list may not describe all possible side effects. Call your doctor for medical advice about side effects. You may report side effects to FDA at 6-032-FDA-6303.  Where should I keep my medicine?  Keep out of the reach of children.  Store at room temperature between 20 and 25 degrees C (68 and 77 degrees F). Store in original container. Protect from light and moisture. Keep tightly closed. Throw away any unused medicine after the expiration date.  NOTE: This sheet is a summary. It may not cover all possible information. If you have questions about this medicine, talk to your doctor, pharmacist, or health care provider.  © 2021 [employer] Standard (2014-10-16 17:57:36)  Rosuvastatin Tablets  What is this medicine?  ROSUVASTATIN (satya SUSY va sta tin) is known as a HMG-CoA reductase inhibitor or 'statin'. It lowers cholesterol and triglycerides in the blood. This drug may also reduce the risk of heart attack, stroke,  or other health problems in patients with risk factors for heart disease. Diet and lifestyle changes are often used with this drug.  This medicine may be used for other purposes; ask your health care provider or pharmacist if you have questions.  COMMON BRAND NAME(S): Crestor  What should I tell my health care provider before I take this medicine?  They need to know if you have any of these conditions:  · diabetes  · if you often drink alcohol  · history of stroke  · kidney disease  · liver disease  · muscle aches or weakness  · thyroid disease  · an unusual or allergic reaction to rosuvastatin, other medicines, foods, dyes, or preservatives  · pregnant or trying to get pregnant  · breast-feeding  How should I use this medicine?  Take this medicine by mouth with a glass of water. Follow the directions on the prescription label. Do not cut, crush or chew this medicine. You can take this medicine with or without food. Take your doses at regular intervals. Do not take your medicine more often than directed.  Talk to your pediatrician regarding the use of this medicine in children. While this drug may be prescribed for children as young as 7 years old for selected conditions, precautions do apply.  Overdosage: If you think you have taken too much of this medicine contact a poison control center or emergency room at once.  NOTE: This medicine is only for you. Do not share this medicine with others.  What if I miss a dose?  If you miss a dose, take it as soon as you can. If your next dose is to be taken in less than 12 hours, then do not take the missed dose. Take the next dose at your regular time. Do not take double or extra doses.  What may interact with this medicine?  Do not take this medicine with any of the following medications:  · herbal medicines like red yeast rice  This medicine may also interact with the following medications:  · alcohol  · antacids containing aluminum hydroxide or magnesium  hydroxide  · cyclosporine  · other medicines for high cholesterol  · some medicines for HIV infection  · warfarin  This list may not describe all possible interactions. Give your health care provider a list of all the medicines, herbs, non-prescription drugs, or dietary supplements you use. Also tell them if you smoke, drink alcohol, or use illegal drugs. Some items may interact with your medicine.  What should I watch for while using this medicine?  Visit your doctor or health care professional for regular check-ups. You may need regular tests to make sure your liver is working properly.  Your health care professional may tell you to stop taking this medicine if you develop muscle problems. If your muscle problems do not go away after stopping this medicine, contact your health care professional.  Do not become pregnant while taking this medicine. Women should inform their health care professional if they wish to become pregnant or think they might be pregnant. There is a potential for serious side effects to an unborn child. Talk to your health care professional or pharmacist for more information. Do not breast-feed an infant while taking this medicine.  This medicine may increase blood sugar. Ask your healthcare provider if changes in diet or medicines are needed if you have diabetes.  If you are going to need surgery or other procedure, tell your doctor that you are using this medicine.  This drug is only part of a total heart-health program. Your doctor or a dietician can suggest a low-cholesterol and low-fat diet to help. Avoid alcohol and smoking, and keep a proper exercise schedule.  This medicine may cause a decrease in Co-Enzyme Q-10. You should make sure that you get enough Co-Enzyme Q-10 while you are taking this medicine. Discuss the foods you eat and the vitamins you take with your health care professional.  What side effects may I notice from receiving this medicine?  Side effects that you should  report to your doctor or health care professional as soon as possible:  · allergic reactions like skin rash, itching or hives, swelling of the face, lips, or tongue  · confusion  · joint pain  · loss of memory  · redness, blistering, peeling or loosening of the skin, including inside the mouth  · signs and symptoms of high blood sugar such as being more thirsty or hungry or having to urinate more than normal. You may also feel very tired or have blurry vision.  · signs and symptoms of muscle injury like dark urine; trouble passing urine or change in the amount of urine; unusually weak or tired; muscle pain or side or back pain  · yellowing of the eyes or skin  Side effects that usually do not require medical attention (report to your doctor or health care professional if they continue or are bothersome):  · constipation  · diarrhea  · dizziness  · gas  · headache  · nausea  · stomach pain  · trouble sleeping  · upset stomach  This list may not describe all possible side effects. Call your doctor for medical advice about side effects. You may report side effects to FDA at 9-885-FDA-2956.  Where should I keep my medicine?  Keep out of the reach of children.  Store at room temperature between 20 and 25 degrees C (68 and 77 degrees F). Keep container tightly closed (protect from moisture). Throw away any unused medicine after the expiration date.  NOTE: This sheet is a summary. It may not cover all possible information. If you have questions about this medicine, talk to your doctor, pharmacist, or health care provider.  © 2021 [employer] Standard (2019-10-10 08:25:08)  Metoprolol Extended-Release Tablets  What is this medicine?  METOPROLOL (me TOE proe lole) is a beta blocker. It decreases the amount of work your heart has to do and helps your heart beat regularly. It treats high blood pressure and/or prevent chest pain (also called angina). It also treats heart failure.  This medicine may be used for other purposes;  ask your health care provider or pharmacist if you have questions.  COMMON BRAND NAME(S): toprol, Toprol XL  What should I tell my health care provider before I take this medicine?  They need to know if you have any of these conditions:  · diabetes  · heart or vessel disease like slow heart rate, worsening heart failure, heart block, sick sinus syndrome or Raynaud's disease  · kidney disease  · liver disease  · lung or breathing disease, like asthma or emphysema  · pheochromocytoma  · thyroid disease  · an unusual or allergic reaction to metoprolol, other beta-blockers, medicines, foods, dyes, or preservatives  · pregnant or trying to get pregnant  · breast-feeding  How should I use this medicine?  Take this drug by mouth. Take it as directed on the prescription label at the same time every day. Take it with food. You may cut the tablet in half if it is scored (has a line in the middle of it). This may help you swallow the tablet if the whole tablet is too big. Be sure to take both halves. Do not take just one-half of the tablet. Keep taking it unless your health care provider tells you to stop.  Talk to your health care provider about the use of this drug in children. While it may be prescribed for children as young as 6 for selected conditions, precautions do apply.  Overdosage: If you think you have taken too much of this medicine contact a poison control center or emergency room at once.  NOTE: This medicine is only for you. Do not share this medicine with others.  What if I miss a dose?  If you miss a dose, take it as soon as you can. If it is almost time for your next dose, take only that dose. Do not take double or extra doses.  What may interact with this medicine?  This medicine may interact with the following medications:  · certain medicines for blood pressure, heart disease, irregular heart beat  · certain medicines for depression, like monoamine oxidase (MAO) inhibitors, fluoxetine, or  paroxetine  · clonidine  · dobutamine  · epinephrine  · isoproterenol  · reserpine  This list may not describe all possible interactions. Give your health care provider a list of all the medicines, herbs, non-prescription drugs, or dietary supplements you use. Also tell them if you smoke, drink alcohol, or use illegal drugs. Some items may interact with your medicine.  What should I watch for while using this medicine?  Visit your doctor or health care professional for regular check ups. Contact your doctor right away if your symptoms worsen. Check your blood pressure and pulse rate regularly. Ask your health care professional what your blood pressure and pulse rate should be, and when you should contact them.  You may get drowsy or dizzy. Do not drive, use machinery, or do anything that needs mental alertness until you know how this medicine affects you. Do not sit or stand up quickly, especially if you are an older patient. This reduces the risk of dizzy or fainting spells. Contact your doctor if these symptoms continue. Alcohol may interfere with the effect of this medicine. Avoid alcoholic drinks.  This medicine may increase blood sugar. Ask your healthcare provider if changes in diet or medicines are needed if you have diabetes.  What side effects may I notice from receiving this medicine?  Side effects that you should report to your doctor or health care professional as soon as possible:  · allergic reactions like skin rash, itching or hives  · cold or numb hands or feet  · depression  · difficulty breathing  · faint  · fever with sore throat  · irregular heartbeat, chest pain  · rapid weight gain  · signs and symptoms of high blood sugar such as being more thirsty or hungry or having to urinate more than normal. You may also feel very tired or have blurry vision.  · swollen legs or ankles  Side effects that usually do not require medical attention (report to your doctor or health care professional if they  continue or are bothersome):  · anxiety or nervousness  · change in sex drive or performance  · dry skin  · headache  · nightmares or trouble sleeping  · short term memory loss  · stomach upset or diarrhea  This list may not describe all possible side effects. Call your doctor for medical advice about side effects. You may report side effects to FDA at 3-184-IQW-2070.  Where should I keep my medicine?  Keep out of the reach of children and pets.  Store at room temperature between 20 and 25 degrees C (68 and 77 degrees F). Throw away any unused drug after the expiration date.  NOTE: This sheet is a summary. It may not cover all possible information. If you have questions about this medicine, talk to your doctor, pharmacist, or health care provider.  © 2021 [employer] (2020-07-30 18:23:00)

## 2021-12-21 NOTE — PROGRESS NOTES
Cardiac Rehab Initial Assessment      Name: George Ribeiro  :1980 Allergies:Patient has no known allergies.   MRN: [id number] 41 y.o. Physician: Hansa Abreu APRN   Primary Diagnosis:    Diagnosis Plan   1. Stented coronary artery     2. ST elevation myocardial infarction involving left anterior descending (LAD) coronary artery (HCC)      Event Date: 21 Specialist:  Rafat Hardy    Secondary Diagnosis: CHF Risk Stratification:High Risk Note Author: Grace Lane RN     Cardiovascular History: Previous MI and Previous PCI     EXERCISE AT HOME  no  10min intervals to add to 30 minutes a day   5 days per week    EF: 35-40%      Source: Echo          Ambulatory Status:Independent  Ambulatory Fall Risk Assessed on Initial Visit: yes 6 Minute Walk Pre- Cardiac Rehab:  Distance:[id number]      RPE:13  Max. HR: 94       SPO2:98    MET: 3.1    Resting /64 LA, 102/66 RA    Peak BP: 110/60  Recovery BP: 104/60  Comments: Patient tolerated 6MWT well, no distress noted, no complaints voiced, denied chest pain pressure or SOA.  Life Vest noted      NUTRITION  Lipids:yes If yes, labs as follows;  Total: No components found for: CHOLESTEROL  HDL:   HDL Cholesterol   Date Value Ref Range Status   2021 41 40 - 60 mg/dL Final    Lipids continued:  LDL:  LDL Cholesterol    Date Value Ref Range Status   2021 149 (H) 0 - 100 mg/dL Final     Triglyceride: No components found for: TRIGLYCERIDE   Weight Management:                 Weight: 242.2lb   Height: 70 in                                   BMI: There is no height or weight on file to calculate BMI.  Waist Circumference: 45  inches   Alcohol Use: none Diabetes:No    Last HGBA1C with date if applicable:No components found for: A1C         SOCIAL HISTORY  Social History     Socioeconomic History   • Marital status:    Tobacco Use   • Smoking status: Never Smoker   • Smokeless tobacco: Never Used   Vaping Use   • Vaping Use: Never used    Substance and Sexual Activity   • Alcohol use: Never   • Drug use: Never   • Sexual activity: Defer       Educational Level (choose one that applies) high school diploma/GED Learning Barriers:Ready to [employer], [employer]    Family Support:yes    Living Arrangement: lives with their spouse    Risk Factors: Stress  Yes, Heredity  Yes If Yes mother, father, brothers and Hyperlipidemia  Yes     Tobacco Adjunct: N/A        Comorbidities: Previous MI     PSYCHOSOCIAL  Clinical Depression: no    Stress: yes     Assess presence or absence of depression using a valid screening tool: yes    PHQ screening 6       PHYSICAL ASSESSMENT  Influenza vaccine: no  Pneumococcal vaccine: no          Angina: no    Describe angina scale of 0 - 4: 0 = none    Today are you having incisional pain? No. If, Yes, Scale: na        Today are you having any other pain? No. If, Yes, Scale: na     Diagnosed with Hypertension:yes    Heart Sounds: S1 S2 no rubs or murmurs noted      Lung Sounds: normal air entry, lungs clear to auscultation         Assessment:Pt tolerated 6MWT well, NAD noted, no complaints voiced,  skin warm, pink and dry, resp within normal limits and even, denies chest discomfort, chest pressure ,SOA .  Monitor shows SB-NSR with BBB noted, Lifevest noted , V/S WDL ,see Alvaro documentation for exercise data.  Dr. Chandler physician immediately available     Pt educated on Cardiac Rehab Program,  warm up, stretching, use of RPE scale, application of heart monitor, home exercise and exercise guidelines, pre exercise meal, Diabetic guidelines for Cardiac Rehab,  Dietary information provided by Janie Minor (dietary) Steps to your health,  s/s to report ,Cleaning and use of equipment, see Epic for all other education completed with patient today.  Verbal teach back verified   Orthopedic Problems: na    Are you being hurt, hit, or frightened by anyone at home or in your life? no    Are you being neglected by a caregiver? No Shoulder  flexibility/Range of motion: Average     Recommended arm activity: Any    Chair sit and reach within: 18 inches   Leg flexibility: Average    Leg Strength/Balance/Five times sit to stand: 17 seconds.     Chose one: Fall Risk    Recommended stretching: Standing    Assessment: Alert and oriented to person place and time  Heart rate Normal rate and Rhythm  Respiratory respirations even , no distress noted  Cap refill less than 2 seconds  Pedal pulses : Normal  no edema noted  Skin :warm and dry         Family attends IA: no Time of arrival: 0900  Time of departure: 1110     Patient Goals: build strength and heart function          12/21/2021  09:10 ANGI Lane RN

## 2021-12-23 ENCOUNTER — APPOINTMENT (OUTPATIENT)
Dept: CARDIAC REHAB | Facility: HOSPITAL | Age: 41
End: 2021-12-23

## 2021-12-23 ENCOUNTER — DOCUMENTATION (OUTPATIENT)
Dept: CARDIAC REHAB | Facility: HOSPITAL | Age: 41
End: 2021-12-23

## 2021-12-23 NOTE — PROGRESS NOTES
Patient called and stated his CHF clinician stated she would like for him to see this cardiologist Dr. Hardy before starting to exercise. He stated his appointment was 1/4/22 at 1145 and he would call me after that appointment.

## 2021-12-28 ENCOUNTER — APPOINTMENT (OUTPATIENT)
Dept: CARDIAC REHAB | Facility: HOSPITAL | Age: 41
End: 2021-12-28

## 2021-12-30 ENCOUNTER — APPOINTMENT (OUTPATIENT)
Dept: CARDIAC REHAB | Facility: HOSPITAL | Age: 41
End: 2021-12-30

## 2022-01-04 ENCOUNTER — APPOINTMENT (OUTPATIENT)
Dept: CARDIAC REHAB | Facility: HOSPITAL | Age: 42
End: 2022-01-04

## 2022-01-05 DIAGNOSIS — F40.10 SOCIAL ANXIETY DISORDER: ICD-10-CM

## 2022-01-05 RX ORDER — CITALOPRAM 40 MG/1
40 TABLET ORAL DAILY
Qty: 30 TABLET | Refills: 5 | Status: SHIPPED | OUTPATIENT
Start: 2022-01-05 | End: 2022-08-08 | Stop reason: SDUPTHER

## 2022-01-06 ENCOUNTER — APPOINTMENT (OUTPATIENT)
Dept: CARDIAC REHAB | Facility: HOSPITAL | Age: 42
End: 2022-01-06

## 2022-01-11 ENCOUNTER — TREATMENT (OUTPATIENT)
Dept: CARDIAC REHAB | Facility: HOSPITAL | Age: 42
End: 2022-01-11

## 2022-01-11 VITALS
HEART RATE: 53 BPM | BODY MASS INDEX: 34.77 KG/M2 | WEIGHT: 242.3 LBS | DIASTOLIC BLOOD PRESSURE: 68 MMHG | OXYGEN SATURATION: 98 % | SYSTOLIC BLOOD PRESSURE: 114 MMHG

## 2022-01-11 DIAGNOSIS — I21.02 ST ELEVATION MYOCARDIAL INFARCTION INVOLVING LEFT ANTERIOR DESCENDING (LAD) CORONARY ARTERY: ICD-10-CM

## 2022-01-11 DIAGNOSIS — Z95.5 S/P CORONARY ARTERY STENT PLACEMENT: ICD-10-CM

## 2022-01-11 PROCEDURE — 93798 PHYS/QHP OP CAR RHAB W/ECG: CPT

## 2022-01-11 RX ORDER — RIVAROXABAN 20 MG/1
20 TABLET, FILM COATED ORAL DAILY
COMMUNITY
Start: 2021-12-28

## 2022-01-11 NOTE — PROGRESS NOTES
Pt attended phase II visit.  Tolerated exercise well, NAD noted, no complaints voiced, skin warm, pink, dry, resp nl and even, denies chest discomfort, denies SOA.   Monitor shows SB-NSR with BBB noted , V/S WDL ,see Alvaro documentation for exercise data.  Dr. Cordero physician immediately available       Patient attended first day of exercise. Educated on warm up, cool down, cleaning and use of equipment, signs and symptoms to report, Cardiac Rehab Protocol, Applying the heart monitor.

## 2022-01-13 ENCOUNTER — TREATMENT (OUTPATIENT)
Dept: CARDIAC REHAB | Facility: HOSPITAL | Age: 42
End: 2022-01-13

## 2022-01-13 VITALS — HEART RATE: 54 BPM | SYSTOLIC BLOOD PRESSURE: 108 MMHG | OXYGEN SATURATION: 98 % | DIASTOLIC BLOOD PRESSURE: 58 MMHG

## 2022-01-13 DIAGNOSIS — I21.02 ST ELEVATION MYOCARDIAL INFARCTION INVOLVING LEFT ANTERIOR DESCENDING (LAD) CORONARY ARTERY: ICD-10-CM

## 2022-01-13 DIAGNOSIS — Z95.5 S/P CORONARY ARTERY STENT PLACEMENT: ICD-10-CM

## 2022-01-13 PROCEDURE — 93798 PHYS/QHP OP CAR RHAB W/ECG: CPT

## 2022-01-13 NOTE — PATIENT INSTRUCTIONS
High Triglycerides Eating Plan  Triglycerides are a type of fat in the blood. High levels of triglycerides can increase your risk of heart disease and stroke. If your triglyceride levels are high, choosing the right foods can help lower your triglycerides and keep your heart healthy. Work with your health care provider or a diet and nutrition specialist (dietitian) to develop an eating plan that is right for you.  What are tips for following this plan?  General guidelines    · Lose weight, if you are overweight. For most people, losing 5-10 lbs (2-5 kg) helps lower triglyceride levels. A weight-loss plan may include.  ? 30 minutes of exercise at least 5 days a week.  ? Reducing the amount of calories, sugar, and fat you eat.  · Eat a wide variety of fresh fruits, vegetables, and whole grains. These foods are high in fiber.  · Eat foods that contain healthy fats, such as fatty fish, nuts, seeds, and olive oil.  · Avoid foods that are high in added sugar, added salt (sodium), saturated fat, and trans fat.  · Avoid low-fiber, refined carbohydrates such as white bread, crackers, noodles, and white rice.  · Avoid foods with partially hydrogenated oils (trans fats), such as fried foods or stick margarine.  · Limit alcohol intake to no more than 1 drink a day for nonpregnant women and 2 drinks a day for men. One drink equals 12 oz of beer, 5 oz of wine, or 1½ oz of hard liquor. Your health care provider may recommend that you drink less depending on your overall health.    Reading food labels  · Check food labels for the amount of saturated fat. Choose foods with no or very little saturated fat.  · Check food labels for the amount of trans fat. Choose foods with no trans fat.  · Check food labels for the amount of cholesterol. Choose foods low in cholesterol. Ask your dietitian how much cholesterol you should have each day.  · Check food labels for the amount of sodium. Choose foods with less than 140 milligrams (mg) per  serving.  Shopping  · Buy dairy products labeled as nonfat (skim) or low-fat (1%).  · Avoid buying processed or prepackaged foods. These are often high in added sugar, sodium, and fat.  Cooking  · Choose healthy fats when cooking, such as olive oil or canola oil.  · Cook foods using lower fat methods, such as baking, broiling, boiling, or grilling.  · Make your own sauces, dressings, and marinades when possible, instead of buying them. Store-bought sauces, dressings, and marinades are often high in sodium and sugar.  Meal planning  · Eat more home-cooked food and less restaurant, buffet, and fast food.  · Eat fatty fish at least 2 times each week. Examples of fatty fish include salmon, trout, mackerel, tuna, and herring.  · If you eat whole eggs, do not eat more than 3 egg yolks per week.  What foods are recommended?  The items listed may not be a complete list. Talk with your dietitian about what dietary choices are best for you.  Grains  Whole wheat or whole grain breads, crackers, cereals, and pasta. Unsweetened oatmeal. Bulgur. Barley. Quinoa. Brown rice. Whole wheat flour tortillas.  Vegetables  Fresh or frozen vegetables. Low-sodium canned vegetables.  Fruits  All fresh, canned (in natural juice), or frozen fruits.  Meats and other protein foods  Skinless chicken or turkey. Ground chicken or turkey. Lean cuts of pork, trimmed of fat. Fish and seafood, especially salmon, trout, and herring. Egg whites. Dried beans, peas, or lentils. Unsalted nuts or seeds. Unsalted canned beans. Natural peanut or almond butter.  Dairy  Low-fat dairy products. Skim or low-fat (1%) milk. Reduced fat (2%) and low-sodium cheese. Low-fat ricotta cheese. Low-fat cottage cheese. Plain, low-fat yogurt.  Fats and oils  Tub margarine without trans fats. Light or reduced-fat mayonnaise. Light or reduced-fat salad dressings. Avocado. Safflower, olive, sunflower, soybean, and canola oils.  What foods are not recommended?  The items listed  may not be a complete list. Talk with your dietitian about what dietary choices are best for you.  Grains  White bread. White (regular) pasta. White rice. Cornbread. Bagels. Pastries. Crackers that contain trans fat.  Vegetables  Creamed or fried vegetables. Vegetables in a cheese sauce.  Fruits  Sweetened dried fruit. Canned fruit in syrup. Fruit juice.  Meats and other protein foods  Fatty cuts of meat. Ribs. Chicken wings. De Jesus. Sausage. Bologna. Salami. Chitterlings. Fatback. Hot dogs. Bratwurst. Packaged lunch meats.  Dairy  Whole or reduced-fat (2%) milk. Half-and-half. Cream cheese. Full-fat or sweetened yogurt. Full-fat cheese. Nondairy creamers. Whipped toppings. Processed cheese or cheese spreads. Cheese curds.  Beverages  Alcohol. Sweetened drinks, such as soda, lemonade, fruit drinks, or punches.  Fats and oils  Butter. Stick margarine. Lard. Shortening. Ghee. De Jesus fat. Tropical oils, such as coconut, palm kernel, or palm oils.  Sweets and desserts  Corn syrup. Sugars. Honey. Molasses. Candy. Jam and jelly. Syrup. Sweetened cereals. Cookies. Pies. Cakes. Donuts. Muffins. Ice cream.  Condiments  Store-bought sauces, dressings, and marinades that are high in sugar, such as ketchup and barbecue sauce.  Summary  · High levels of triglycerides can increase the risk of heart disease and stroke. Choosing the right foods can help lower your triglycerides.  · Eat plenty of fresh fruits, vegetables, and whole grains. Choose low-fat dairy and lean meats. Eat fatty fish at least twice a week.  · Avoid processed and prepackaged foods with added sugar, sodium, saturated fat, and trans fat.  · If you need suggestions or have questions about what types of food are good for you, talk with your health care provider or a dietitian.  This information is not intended to replace advice given to you by your health care provider. Make sure you discuss any questions you have with your health care provider.  Document Revised:  04/21/2021 Document Reviewed: 04/21/2021  Elsevier Patient Education © 2021 [employer]    Preventing High Cholesterol  Cholesterol is a white, waxy substance similar to fat that the human body needs to help build cells. The liver makes all the cholesterol that a person's body needs. Having high cholesterol (hypercholesterolemia) increases your risk for heart disease and stroke. Extra or excess cholesterol comes from the food that you eat.  High cholesterol can often be prevented with diet and lifestyle changes. If you already have high cholesterol, you can control it with diet, lifestyle changes, and medicines.  How can high cholesterol affect me?  If you have high cholesterol, fatty deposits (plaques) may build up on the walls of your blood vessels. The blood vessels that carry blood away from your heart are called arteries. Plaques make the arteries narrower and stiffer. This in turn can:  · Restrict or block blood flow and cause blood clots to form.  · Increase your risk for heart attack and stroke.  What can increase my risk for high cholesterol?  This condition is more likely to develop in people who:  · Eat foods that are high in saturated fat or cholesterol. Saturated fat is mostly found in foods that come from animal sources.  · Are overweight.  · Are not getting enough exercise.  · Have a family history of high cholesterol (familial hypercholesterolemia).  What actions can I take to prevent this?  Nutrition    · Eat less saturated fat.  · Avoid trans fats (partially hydrogenated oils). These are often found in margarine and in some baked goods, fried foods, and snacks bought in packages.  · Avoid precooked or cured meat, such as barreto, sausages, or meat loaves.  · Avoid foods and drinks that have added sugars.  · Eat more fruits, vegetables, and whole grains.  · Choose healthy sources of protein, such as fish, poultry, lean cuts of red meat, beans, peas, lentils, and nuts.  · Choose healthy sources of  fat, such as:  ? Nuts.  ? Vegetable oils, especially olive oil.  ? Fish that have healthy fats, such as omega-3 fatty acids. These fish include mackerel or salmon.    Lifestyle  · Lose weight if you are overweight. Maintaining a healthy body mass index (BMI) can help prevent or control high cholesterol. It can also lower your risk for diabetes and high blood pressure. Ask your health care provider to help you with a diet and exercise plan to lose weight safely.  · Do not use any products that contain nicotine or tobacco, such as cigarettes, e-cigarettes, and chewing tobacco. If you need help quitting, ask your health care provider.  Alcohol use  · Do not drink alcohol if:  ? Your health care provider tells you not to drink.  ? You are pregnant, may be pregnant, or are planning to become pregnant.  · If you drink alcohol:  ? Limit how much you use to:  § 0-1 drink a day for women.  § 0-2 drinks a day for men.  ? Be aware of how much alcohol is in your drink. In the [place] one drink equals one 12 oz bottle of beer (355 mL), one 5 oz glass of wine (148 mL), or one 1½ oz glass of hard liquor (44 mL).  Activity    · Get enough exercise. Do exercises as told by your health care provider.  · Each week, do at least 150 minutes of exercise that takes a medium level of effort (moderate-intensity exercise). This kind of exercise:  ? Makes your heart beat faster while allowing you to still be able to talk.  ? Can be done in short sessions several times a day or longer sessions a few times a week. For example, on 5 days each week, you could walk fast or ride your bike 3 times a day for 10 minutes each time.    Medicines  · Your health care provider may recommend medicines to help lower cholesterol. This may be a medicine to lower the amount of cholesterol that your liver makes. You may need medicine if:  ? Diet and lifestyle changes have not lowered your cholesterol enough.  ? You have high cholesterol and other risk factors for  heart disease or stroke.  · Take over-the-counter and prescription medicines only as told by your health care provider.  General information  · Manage your risk factors for high cholesterol. Talk with your health care provider about all your risk factors and how to lower your risk.  · Manage other conditions that you have, such as diabetes or high blood pressure (hypertension).  · Have blood tests to check your cholesterol levels at regular points in time as told by your health care provider.  · Keep all follow-up visits as told by your health care provider. This is important.  Where to find more information  · American Heart Association: www.heart.org  · National Heart, Lung, and Blood [place]: www.nhlbi.nih.gov  Summary  · High cholesterol increases your risk for heart disease and stroke. By keeping your cholesterol level low, you can reduce your risk for these conditions.  · High cholesterol can often be prevented with diet and lifestyle changes.  · Work with your health care provider to manage your risk factors, and have your blood tested regularly.  This information is not intended to replace advice given to you by your health care provider. Make sure you discuss any questions you have with your health care provider.  Document Revised: 09/29/2020 Document Reviewed: 09/29/2020  Elsevier Patient Education © 2021 [employer] Inc.

## 2022-01-13 NOTE — PROGRESS NOTES
Pt attended phase II visit.  Tolerated exercise well, NAD noted, no complaints voiced, skin warm, pink, dry, resp nl and even, denies chest discomfort, denies SOA.   Monitor shows SB-NSR with BBB noted , V/S WDL ,see Alvaro documentation for exercise data.  Dr. Cordero physician immediately available       See Epic for education

## 2022-01-20 ENCOUNTER — TREATMENT (OUTPATIENT)
Dept: CARDIAC REHAB | Facility: HOSPITAL | Age: 42
End: 2022-01-20

## 2022-01-20 VITALS — DIASTOLIC BLOOD PRESSURE: 66 MMHG | OXYGEN SATURATION: 98 % | HEART RATE: 60 BPM | SYSTOLIC BLOOD PRESSURE: 110 MMHG

## 2022-01-20 DIAGNOSIS — I21.02 ST ELEVATION MYOCARDIAL INFARCTION INVOLVING LEFT ANTERIOR DESCENDING (LAD) CORONARY ARTERY: ICD-10-CM

## 2022-01-20 DIAGNOSIS — Z95.5 S/P CORONARY ARTERY STENT PLACEMENT: ICD-10-CM

## 2022-01-20 PROCEDURE — 93798 PHYS/QHP OP CAR RHAB W/ECG: CPT

## 2022-01-20 NOTE — PROGRESS NOTES
Pt attended phase II visit.  Tolerated exercise well, NAD noted, no complaints voiced, skin warm, pink, dry, resp nl and even, denies chest discomfort, denies SOA.   Monitor shows SB-NSR with BBB noted , V/S WDL ,see Alvaro documentation for exercise data.  Dr. Chandler physician immediately available

## 2022-01-25 ENCOUNTER — TREATMENT (OUTPATIENT)
Dept: CARDIAC REHAB | Facility: HOSPITAL | Age: 42
End: 2022-01-25

## 2022-01-25 VITALS — HEART RATE: 64 BPM | SYSTOLIC BLOOD PRESSURE: 108 MMHG | OXYGEN SATURATION: 98 % | DIASTOLIC BLOOD PRESSURE: 70 MMHG

## 2022-01-25 DIAGNOSIS — I21.02 ST ELEVATION MYOCARDIAL INFARCTION INVOLVING LEFT ANTERIOR DESCENDING (LAD) CORONARY ARTERY: ICD-10-CM

## 2022-01-25 DIAGNOSIS — Z95.5 S/P CORONARY ARTERY STENT PLACEMENT: ICD-10-CM

## 2022-01-25 PROCEDURE — 93798 PHYS/QHP OP CAR RHAB W/ECG: CPT

## 2022-01-25 NOTE — PROGRESS NOTES
Pt attended phase II visit.  Tolerated exercise well, NAD noted, no complaints voiced, skin warm, pink, dry, resp nl and even, denies chest discomfort, denies SOA.   Monitor shows SB-NSR with BBB noted , V/S WDL ,see Alvaro documentation for exercise data.    physician immediately available

## 2022-01-27 ENCOUNTER — TREATMENT (OUTPATIENT)
Dept: CARDIAC REHAB | Facility: HOSPITAL | Age: 42
End: 2022-01-27

## 2022-01-27 VITALS — OXYGEN SATURATION: 98 % | DIASTOLIC BLOOD PRESSURE: 60 MMHG | SYSTOLIC BLOOD PRESSURE: 102 MMHG | HEART RATE: 55 BPM

## 2022-01-27 DIAGNOSIS — Z95.5 S/P CORONARY ARTERY STENT PLACEMENT: ICD-10-CM

## 2022-01-27 DIAGNOSIS — I21.02 ST ELEVATION MYOCARDIAL INFARCTION INVOLVING LEFT ANTERIOR DESCENDING (LAD) CORONARY ARTERY: ICD-10-CM

## 2022-01-27 PROCEDURE — 93798 PHYS/QHP OP CAR RHAB W/ECG: CPT

## 2022-01-27 NOTE — PROGRESS NOTES
CARDIAC/PULMONARY REHAB NUTRITION EDUCATION/ASSESSMENT      41 y.o.         Height: 71  in    Weight: 242  lb     BMI: 33.7 IBW:  189      %IBW: na      Adj IBW:             Time seen:  9 AM   Diet Survey Score: na  Cardiac Risk Factors: MI/stents Weight Assessment: Obese  Weight Change:  weight loss of 6 lbs over the past 2 month(s)    Intentional?  Yes  Usual Weight: 250 lb  Desired Weight: 200 lb     Current Diet: cardiac  Appetite: good   Factors limiting PO intake:  NA  Taste/smell changes:  No Food records reviewed? Yes     Occupation:    Job Activity Level:moderate    Routine Exercise: mild     Who does the patient live with: wife  Who does the cooking at home:  spoiuse  Spouse/significant other's name:  Janet  Spouse/significant other present for diet instruction today? No  Patient actively receiving lifestyle support from others at home? Yes       Pertinent Lab Values:   Total: No components found for: CHOLESTEROL  HDL:   HDL Cholesterol   Date Value Ref Range Status   08/23/2021 41 40 - 60 mg/dL Final     LDL:  LDL Cholesterol    Date Value Ref Range Status   08/23/2021 149 (H) 0 - 100 mg/dL Final     Triglyceride: No components found for: TRIGLYCERIDE  Last HGBA1C with date if applicable:No components found for: A1C  Glucose:   Glucose   Date Value Ref Range Status   08/23/2021 85 65 - 99 mg/dL Final    Nutritional Supplements: NA    Pertinent Nutrition-Related Medications:  NA         Stated Problem Areas / Concerns: Weight management and heart healthy eating     Assessment / Recommendations: Client voices significant changes in eating habits since MI - increased fruits and vegetables - minimal intake of red meat - low fat diet and increased physical activity   Motivation level toward diet compliance: strong       Education:    Education Level:  High school  Previous cardiac diet education prior to coming to Cardiac Rehab?  No  Instructed on:  Cardiac diet  Lipid management  1500 mg/day Na  restriction  Label reading for heart health  Advised against salt-substitute use  Written materials given:  Yes         Goals:  Continue with current diet plan - encouraged intake of omega 3 rich foods - minimal 2/week - client receptive to education verbalized understanding and willingness to comply - client with high level of knowledge r/t heart healthy eating -compliance expected -  RD contact info provided                 11:49 EST  1/27/2022  Janie Beebe RD

## 2022-02-01 ENCOUNTER — APPOINTMENT (OUTPATIENT)
Dept: CARDIAC REHAB | Facility: HOSPITAL | Age: 42
End: 2022-02-01

## 2022-02-03 ENCOUNTER — TREATMENT (OUTPATIENT)
Dept: CARDIAC REHAB | Facility: HOSPITAL | Age: 42
End: 2022-02-03

## 2022-02-03 VITALS — SYSTOLIC BLOOD PRESSURE: 118 MMHG | DIASTOLIC BLOOD PRESSURE: 60 MMHG | HEART RATE: 57 BPM | OXYGEN SATURATION: 98 %

## 2022-02-03 DIAGNOSIS — I21.02 ST ELEVATION MYOCARDIAL INFARCTION INVOLVING LEFT ANTERIOR DESCENDING (LAD) CORONARY ARTERY: ICD-10-CM

## 2022-02-03 DIAGNOSIS — Z95.5 S/P CORONARY ARTERY STENT PLACEMENT: ICD-10-CM

## 2022-02-03 PROCEDURE — 93798 PHYS/QHP OP CAR RHAB W/ECG: CPT

## 2022-02-08 ENCOUNTER — TREATMENT (OUTPATIENT)
Dept: CARDIAC REHAB | Facility: HOSPITAL | Age: 42
End: 2022-02-08

## 2022-02-08 VITALS — HEART RATE: 62 BPM | DIASTOLIC BLOOD PRESSURE: 58 MMHG | OXYGEN SATURATION: 98 % | SYSTOLIC BLOOD PRESSURE: 102 MMHG

## 2022-02-08 DIAGNOSIS — Z95.5 S/P CORONARY ARTERY STENT PLACEMENT: ICD-10-CM

## 2022-02-08 DIAGNOSIS — I21.02 ST ELEVATION MYOCARDIAL INFARCTION INVOLVING LEFT ANTERIOR DESCENDING (LAD) CORONARY ARTERY: ICD-10-CM

## 2022-02-08 PROCEDURE — 93798 PHYS/QHP OP CAR RHAB W/ECG: CPT

## 2022-02-10 ENCOUNTER — TREATMENT (OUTPATIENT)
Dept: CARDIAC REHAB | Facility: HOSPITAL | Age: 42
End: 2022-02-10

## 2022-02-10 VITALS — DIASTOLIC BLOOD PRESSURE: 58 MMHG | OXYGEN SATURATION: 98 % | SYSTOLIC BLOOD PRESSURE: 102 MMHG | HEART RATE: 58 BPM

## 2022-02-10 DIAGNOSIS — I21.02 ST ELEVATION MYOCARDIAL INFARCTION INVOLVING LEFT ANTERIOR DESCENDING (LAD) CORONARY ARTERY: ICD-10-CM

## 2022-02-10 DIAGNOSIS — Z95.5 S/P CORONARY ARTERY STENT PLACEMENT: ICD-10-CM

## 2022-02-10 PROCEDURE — 93798 PHYS/QHP OP CAR RHAB W/ECG: CPT

## 2022-02-10 RX ORDER — ALIROCUMAB 75 MG/ML
75 INJECTION, SOLUTION SUBCUTANEOUS TAKE AS DIRECTED
COMMUNITY

## 2022-02-10 NOTE — PROGRESS NOTES
Pt attended phase II visit.  Tolerated exercise well, NAD noted, no complaints voiced, skin warm, pink, dry, resp nl and even, denies chest discomfort, denies SOA.   Monitor shows SB-NSR with BBB noted , V/S WDL ,see Alvaro documentation for exercise data.  Dr. Cordero   physician immediately available       Echo report sent down to medical records

## 2022-02-10 NOTE — PATIENT INSTRUCTIONS
Subcutaneous Cardioverter Defibrillator Implantation  A subcutaneous implantable cardioverter defibrillator (S-ICD) is a device that identifies and corrects abnormal heart rhythms. Subcutaneous cardioverter defibrillator implantation is a surgery to place an S-ICD under the skin in your chest. An S-ICD has a battery, a small computer (pulse generator), and a device that moves electrical currents (electrode). The S-ICD detects and corrects two types of dangerous irregular heart rhythms (arrhythmias):  · A rapid heart rhythm in the lower chambers of the heart (ventricles). This is called ventricular tachycardia.  · The ventricles rudolph in an uncoordinated way. This is called ventricular fibrillation.  When the S-ICD detects ventricular tachycardia or fibrillation, it sends a shock to the heart that attempts to restore the heartbeat to normal (defibrillation). The shock may feel like a strong jolt in the chest.  Your health care provider may recommend S-ICD implantation if:  · You had an arrhythmia that started in the ventricles.  · Your heart has damage from heart disease or a heart condition.  · Your heart muscle is weak.  · You have congenital heart disease.  Your health care provider may recommend implantation of an S-ICD instead of a traditional ICD if:  · You have a blood vessel disorder (vascular disease).  · You have medical conditions that increase your risk for infection or you have had a prior ICD infection.  · You do not need a pacemaker.  · You are younger in age and are expected to have a long-term need for defibrillation.  Talk with your health care provider about the benefits of an S-ICD.  Tell a health care provider about:  · Any allergies you have.  · All medicines you are taking, including vitamins, herbs, eye drops, creams, and over-the-counter medicines.  · Any problems you or family members have had with anesthetic medicines.  · Any blood disorders you have.  · Any surgeries you have  had.  · Any medical conditions you have.  · Whether you are pregnant or may be pregnant.  What are the risks?  Generally, this is a safe procedure. However, problems may occur, including:  · Infection.  · Bleeding.  · Allergic reactions to medicines or dyes.  · Failure to shock or correct the arrhythmia.  · Swelling and bruising.  · Blood clots.  What happens before the procedure?  Staying hydrated  Follow instructions from your health care provider about hydration, which may include:  · Up to 2 hours before the procedure - you may continue to drink clear liquids, such as water, clear fruit juice, black coffee, and plain tea.    Eating and drinking restrictions  Follow instructions from your health care provider about eating and drinking, which may include:  · 8 hours before the procedure - stop eating heavy meals or foods, such as meat, fried foods, or fatty foods.  · 6 hours before the procedure - stop eating light meals or foods, such as toast or cereal.  · 6 hours before the procedure - stop drinking milk or drinks that contain milk.  · 2 hours before the procedure - stop drinking clear liquids.  Medicines  Ask your health care provider about:  · Changing or stopping your regular medicines. This is especially important if you are taking diabetes medicines or blood thinners.  · Taking medicines such as aspirin and ibuprofen. These medicines can thin your blood. Do not take these medicines unless your health care provider tells you to take them.  · Taking over-the-counter medicines, vitamins, herbs, and supplements.  Tests  You may have tests, such as:  · Blood tests.  · A test to check the electrical signals in your heart (electrocardiogram, ECG).  · Imaging tests, such as a chest X-ray.  General instructions  · Do not use any products that contain nicotine or tobacco for at least 4 weeks before the procedure. These products include cigarettes, chewing tobacco, and vaping devices, such as e-cigarettes. If you  need help quitting, ask your health care provider.  · Ask your health care provider:  ? How your procedure site will be marked.  ? What steps will be taken to help prevent infection. These may include:  § Removing hair at the surgery site.  § Washing skin with a germ-killing soap.  § Taking antibiotic medicine.  · Plan to have a responsible adult care for you for the time you are told after you leave the hospital or clinic. This is important.  What happens during the procedure?  · Small monitors will be put on your body. They will be used to check your heart, blood pressure, and oxygen level.  · An IV will be inserted into one of your veins.  · You will be given one or more of the following:  ? A medicine to help you relax (sedative).  ? A medicine to numb the area (local anesthetic).  ? A medicine to make you fall asleep (general anesthetic).  · A small incision will be made on the left side of your chest near your rib cage, under your arm.  · A pocket (pouch) will be made in the skin on the left lower part of your chest. The S-ICD pulse generator will be inserted into this pocket.  · An electrode will be placed under the skin in your chest, near your breastbone (sternum).  · The electrode will be attached to the S-ICD pulse generator.  · The S-ICD will be tested, and your health care provider will program the S-ICD for the condition being treated.  · Your incision will be closed with stitches (sutures) or glue and adhesive strips.  · A bandage (dressing) may be placed over your incision.  The procedure may vary among health care providers and hospitals.  What happens after the procedure?  · Your blood pressure, heart rate, breathing rate, and blood oxygen level will be monitored until you leave the hospital or clinic.  · You will be given pain medicine as needed.  · You may have a chest X-ray to check the S-ICD.  · You will get an identification card explaining that you have an S-ICD. You will need to keep this  card with you at all times.  · You will be given a remote home monitoring device to use with your S-ICD to allow your device to communicate with your clinic.  · Do not drive until your health care provider approves.  Summary  · A subcutaneous cardioverter defibrillator implantation is a type of surgery to implant a device that corrects dangerous irregular heart rhythms (arrhythmias).  · During the surgery, a subcutaneous implantable cardioverter defibrillator (S-ICD) is placed under the skin in the chest.  · The S-ICD electrode rests in the chest, but the electrode does not directly attach to the heart or blood vessels.  This information is not intended to replace advice given to you by your health care provider. Make sure you discuss any questions you have with your health care provider.  Document Revised: 06/16/2021 Document Reviewed: 06/16/2021  Elsevier Patient Education © 2021 [employer]    Cardioverter Defibrillator Implantation  An implantable cardioverter defibrillator (ICD) is a device that identifies and corrects abnormal heart rhythms. Cardioverter defibrillator implantation is a surgery to place an ICD under the skin in the chest or abdomen. An ICD has a battery, a small computer (pulse generator), and wires (leads) that go into the heart. The ICD detects and corrects two types of dangerous irregular heart rhythms (arrhythmias):  · A rapid heart rhythm in the lower chambers of the heart (ventricles). This is called ventricular tachycardia.  · The ventricles rudolph in an uncoordinated way. This is called ventricular fibrillation.  There are different types of ICDs, and the electrical signals from the ICD can be programmed differently based on the condition being treated. The electrical signals from the ICD can be low-energy pulses, high-energy shocks, or a combination of the two. The low-energy pulses are generally used to restore the heartbeat to normal when it is either too slow (bradycardia) or  too fast. These pulses are painless. The high-energy shocks are used to treat abnormal rhythms such as ventricular tachycardia or ventricular fibrillation. This shock may feel like a strong jolt in the chest.  Your health care provider may recommend an ICD if you have:  · Had a ventricular arrhythmia in the past.  · A damaged heart because of a disease or heart condition.  · A weakened heart muscle from a heart attack or cardiac arrest.  · A congenital heart defect.  · Long QT syndrome, which is a disorder of the heart's electrical system.  · Brugada syndrome, which is a condition that causes a disruption of the heart's normal rhythm.  Tell a health care provider about:  · Any allergies you have.  · All medicines you are taking, including vitamins, herbs, eye drops, creams, and over-the-counter medicines.  · Any problems you or family members have had with anesthetic medicines.  · Any blood disorders you have.  · Any surgeries you have had.  · Any medical conditions you have.  · Whether you are pregnant or may be pregnant.  What are the risks?  Generally, this is a safe procedure. However, problems may occur, including:  · Infection.  · Bleeding.  · Allergic reactions to medicines used during the procedure.  · Blood clots.  · Swelling or bruising.  · Damage to nearby structures or organs, such as nerves, lungs, blood vessels, or the heart where the ICD leads or pulse generator is implanted.  What happens before the procedure?  Staying hydrated  Follow instructions from your health care provider about hydration, which may include:  · Up to 2 hours before the procedure - you may continue to drink clear liquids, such as water, clear fruit juice, black coffee, and plain tea.    Eating and drinking restrictions  Follow instructions from your health care provider about eating and drinking, which may include:  · 8 hours before the procedure - stop eating heavy meals or foods, such as meat, fried foods, or fatty foods.  · 6  hours before the procedure - stop eating light meals or foods, such as toast or cereal.  · 6 hours before the procedure - stop drinking milk or drinks that contain milk.  · 2 hours before the procedure - stop drinking clear liquids.  Medicines  Ask your health care provider about:  · Changing or stopping your regular medicines. This is especially important if you are taking diabetes medicines or blood thinners.  · Taking medicines such as aspirin and ibuprofen. These medicines can thin your blood. Do not take these medicines unless your health care provider tells you to take them.  · Taking over-the-counter medicines, vitamins, herbs, and supplements.  Tests  You may have an exam or testing. These may include:  · Blood tests.  · A test to check the electrical signals in your heart (electrocardiogram, ECG).  · Imaging tests, such as a chest X-ray.  · Echocardiogram. This is an ultrasound of your heart to evaluate your heart structures and function.  · An event monitor or Holter monitor to wear at home.  General instructions  · Do not use any products that contain nicotine or tobacco for at least 4 weeks before the procedure. These products include cigarettes, chewing tobacco, and vaping devices, such as e-cigarettes. If you need help quitting, ask your health care provider.  · Ask your health care provider:  ? How your procedure site will be marked.  ? What steps will be taken to help prevent infection. These may include:  § Removing hair at the surgery site.  § Washing skin with a germ-killing soap.  § Taking antibiotic medicine.  · You may be asked to shower with a germ-killing soap.  · Plan to have a responsible adult take you home from the hospital or clinic.  What happens during the procedure?    · Small monitors will be put on your body. They will be used to check your heart rate, blood pressure, and oxygen level.  · A pair of sticky pads (defibrillator pads) may be placed on your back and chest. These pads are  able to pace your heart as needed during the procedure.  · An IV will be inserted into one of your veins.  · You will be given one or more of the following:  ? A medicine to help you relax (sedative).  ? A medicine to numb the area (local anesthetic).  ? A medicine to make you fall asleep(general anesthetic).  · A small incision will be made to create a deep pocket under the skin of your chest or abdomen.  · Leads will be guided through a blood vessel into your heart and attached to your heart muscles. Depending on the ICD, the leads may go into one ventricle, or they may go into both ventricles and into an upper chamber of the heart. An X-ray machine (fluoroscope) will be used to help guide the leads. The other end of the leads will be attached to the pulse generator.  · The pulse generator will be placed into the pocket under the skin.  · The ICD will be tested, and your health care provider will program the ICD for the condition being treated.  · The incision will be closed with stitches (sutures), skin glue, adhesive strips, or staples.  · A bandage (dressing) will be placed over the incision.  The procedure may vary among health care providers and hospitals.  What happens after the procedure?  · Your blood pressure, heart rate, breathing rate, and blood oxygen level will be monitored until you leave the hospital or clinic. Your health care provider will also monitor your ICD to make sure it is working properly.  · A chest X-ray will be taken to check that the ICD is in the right place.  · Do not raise the arm on the side of your procedure higher than your shoulder for as long as told by your health care provider. This is usually at least 6 weeks.  · You may be given an identification card explaining that you have an ICD.  · You will be given a remote home monitoring device to use with your ICD to allow your device to communicate with your clinic.  Summary  · An implantable cardioverter defibrillator (ICD) is a  device that identifies and corrects abnormal heart rhythms. Cardioverter defibrillator implantation is a surgery to place an ICD under the skin in the chest or abdomen.  · An ICD consists of a battery, a small computer (pulse generator), and wires (leads) that go into the heart.  · During the procedure, the ICD will be tested, and your health care provider will program the ICD for the condition being treated.  · After the procedure, a chest X-ray will be taken to check that the ICD is in the right place.  This information is not intended to replace advice given to you by your health care provider. Make sure you discuss any questions you have with your health care provider.  Document Revised: 06/16/2021 Document Reviewed: 06/16/2021  Elsevier Patient Education © 2021 [employer]

## 2022-02-15 ENCOUNTER — TREATMENT (OUTPATIENT)
Dept: CARDIAC REHAB | Facility: HOSPITAL | Age: 42
End: 2022-02-15

## 2022-02-15 VITALS — DIASTOLIC BLOOD PRESSURE: 60 MMHG | OXYGEN SATURATION: 98 % | SYSTOLIC BLOOD PRESSURE: 110 MMHG | HEART RATE: 55 BPM

## 2022-02-15 DIAGNOSIS — Z95.5 S/P CORONARY ARTERY STENT PLACEMENT: ICD-10-CM

## 2022-02-15 DIAGNOSIS — I21.02 ST ELEVATION MYOCARDIAL INFARCTION INVOLVING LEFT ANTERIOR DESCENDING (LAD) CORONARY ARTERY: ICD-10-CM

## 2022-02-15 PROCEDURE — 93798 PHYS/QHP OP CAR RHAB W/ECG: CPT

## 2022-02-15 NOTE — PROGRESS NOTES
Pt attended phase II visit.  Tolerated exercise well, NAD noted, no complaints voiced, skin warm, pink, dry, resp nl and even, denies chest discomfort, denies SOA.   Monitor shows SB-NSR with BBB noted , V/S WDL ,see [place] documentation for exercise data.  Dr. Duarte  physician immediately available

## 2022-02-17 ENCOUNTER — TREATMENT (OUTPATIENT)
Dept: CARDIAC REHAB | Facility: HOSPITAL | Age: 42
End: 2022-02-17

## 2022-02-17 VITALS — DIASTOLIC BLOOD PRESSURE: 60 MMHG | OXYGEN SATURATION: 98 % | SYSTOLIC BLOOD PRESSURE: 102 MMHG | HEART RATE: 60 BPM

## 2022-02-17 DIAGNOSIS — Z95.5 S/P CORONARY ARTERY STENT PLACEMENT: ICD-10-CM

## 2022-02-17 DIAGNOSIS — I21.02 ST ELEVATION MYOCARDIAL INFARCTION INVOLVING LEFT ANTERIOR DESCENDING (LAD) CORONARY ARTERY: ICD-10-CM

## 2022-02-17 PROCEDURE — 93798 PHYS/QHP OP CAR RHAB W/ECG: CPT

## 2022-02-22 ENCOUNTER — APPOINTMENT (OUTPATIENT)
Dept: CARDIAC REHAB | Facility: HOSPITAL | Age: 42
End: 2022-02-22

## 2022-02-23 ENCOUNTER — TREATMENT (OUTPATIENT)
Dept: CARDIAC REHAB | Facility: HOSPITAL | Age: 42
End: 2022-02-23

## 2022-02-23 VITALS — HEART RATE: 55 BPM | DIASTOLIC BLOOD PRESSURE: 60 MMHG | SYSTOLIC BLOOD PRESSURE: 102 MMHG | OXYGEN SATURATION: 98 %

## 2022-02-23 DIAGNOSIS — Z95.5 S/P CORONARY ARTERY STENT PLACEMENT: ICD-10-CM

## 2022-02-23 DIAGNOSIS — I21.02 ST ELEVATION MYOCARDIAL INFARCTION INVOLVING LEFT ANTERIOR DESCENDING (LAD) CORONARY ARTERY: ICD-10-CM

## 2022-02-23 PROCEDURE — 93798 PHYS/QHP OP CAR RHAB W/ECG: CPT

## 2022-02-23 NOTE — PROGRESS NOTES
Pt attended phase II visit.  Tolerated exercise well, NAD noted, no complaints voiced, skin warm, pink, dry, resp nl and even, denies chest discomfort, denies SOA.   Monitor shows SB-NSR with BBB noted , V/S WDL ,see [facility] documentation for exercise data.  Dr. Cordero  physician immediately available

## 2022-02-24 ENCOUNTER — APPOINTMENT (OUTPATIENT)
Dept: CARDIAC REHAB | Facility: HOSPITAL | Age: 42
End: 2022-02-24

## 2022-03-01 ENCOUNTER — TREATMENT (OUTPATIENT)
Dept: CARDIAC REHAB | Facility: HOSPITAL | Age: 42
End: 2022-03-01

## 2022-03-01 VITALS — HEART RATE: 53 BPM | SYSTOLIC BLOOD PRESSURE: 112 MMHG | OXYGEN SATURATION: 98 % | DIASTOLIC BLOOD PRESSURE: 54 MMHG

## 2022-03-01 DIAGNOSIS — Z95.5 S/P CORONARY ARTERY STENT PLACEMENT: ICD-10-CM

## 2022-03-01 DIAGNOSIS — I21.02 ST ELEVATION MYOCARDIAL INFARCTION INVOLVING LEFT ANTERIOR DESCENDING (LAD) CORONARY ARTERY: ICD-10-CM

## 2022-03-01 PROCEDURE — 93798 PHYS/QHP OP CAR RHAB W/ECG: CPT

## 2022-03-01 NOTE — PROGRESS NOTES
Pt attended phase II visit.  Tolerated exercise well, NAD noted, no complaints voiced, skin warm, pink, dry, resp nl and even, denies chest discomfort, denies SOA.   Monitor shows SB-NSR with BBB noted ,  [employer] noted V/S WDL ,see [facility] documentation for exercise data.  Dr. Chandler  physician immediately available

## 2022-03-03 ENCOUNTER — TREATMENT (OUTPATIENT)
Dept: CARDIAC REHAB | Facility: HOSPITAL | Age: 42
End: 2022-03-03

## 2022-03-03 VITALS — DIASTOLIC BLOOD PRESSURE: 60 MMHG | OXYGEN SATURATION: 98 % | SYSTOLIC BLOOD PRESSURE: 102 MMHG | HEART RATE: 57 BPM

## 2022-03-03 DIAGNOSIS — Z95.5 S/P CORONARY ARTERY STENT PLACEMENT: ICD-10-CM

## 2022-03-03 DIAGNOSIS — I21.02 ST ELEVATION MYOCARDIAL INFARCTION INVOLVING LEFT ANTERIOR DESCENDING (LAD) CORONARY ARTERY: ICD-10-CM

## 2022-03-03 PROCEDURE — 93798 PHYS/QHP OP CAR RHAB W/ECG: CPT

## 2022-03-08 ENCOUNTER — APPOINTMENT (OUTPATIENT)
Dept: CARDIAC REHAB | Facility: HOSPITAL | Age: 42
End: 2022-03-08

## 2022-03-10 ENCOUNTER — TREATMENT (OUTPATIENT)
Dept: CARDIAC REHAB | Facility: HOSPITAL | Age: 42
End: 2022-03-10

## 2022-03-10 VITALS — OXYGEN SATURATION: 98 % | DIASTOLIC BLOOD PRESSURE: 60 MMHG | SYSTOLIC BLOOD PRESSURE: 102 MMHG | HEART RATE: 52 BPM

## 2022-03-10 DIAGNOSIS — Z95.5 S/P CORONARY ARTERY STENT PLACEMENT: ICD-10-CM

## 2022-03-10 DIAGNOSIS — I21.02 ST ELEVATION MYOCARDIAL INFARCTION INVOLVING LEFT ANTERIOR DESCENDING (LAD) CORONARY ARTERY: Primary | ICD-10-CM

## 2022-03-10 PROCEDURE — 93798 PHYS/QHP OP CAR RHAB W/ECG: CPT

## 2022-03-10 NOTE — PATIENT INSTRUCTIONS
Cardiac Rehabilitation  Your physician has referred you for Cardiac Rehabilitation*.  Please contact your local Cardiac/Pulmonary Rehabilitation Center to schedule your appointment:  [facility] [phone]  [address] [place] [address]                                                                          [facility] [phone]              [address] [place] [address]  [facility] [phone]              [address] [place] [address]  [facility] [phone]              [address] [place] [address]  [facility] [phone]              [address] [place] [address]   [facility] [phone]              [address] [place] [address]  [facility] [phone]  [address] [place] [address]  [facility] [phone]  [address] [place] [address]  [facility] [phone] [address] [facility] [address], [place] [address]  [facility] [phone]              [address] [place] [address]  If you live outside the local area, please contact your cardiologist for Cardiac Rehabilitation recommendations.    *Please bring your entire discharge packet with you to your first appointment. It contains important information for your cardiac rehabilitation team. You may wish to contact your insurance company for information about co-pays and coverage.

## 2022-03-10 NOTE — PROGRESS NOTES
Pt attended phase II visit.  Tolerated exercise well, NAD noted, no complaints voiced, skin warm, pink, dry, resp nl and even, denies chest discomfort, denies SOA.   Monitor shows SB-NSR with BBB noted ,  Lifevest noted V/S WDL ,see [place] documentation for exercise data.  Dr. Cordero physician immediately available

## 2022-03-15 ENCOUNTER — TREATMENT (OUTPATIENT)
Dept: CARDIAC REHAB | Facility: HOSPITAL | Age: 42
End: 2022-03-15

## 2022-03-15 VITALS — DIASTOLIC BLOOD PRESSURE: 58 MMHG | SYSTOLIC BLOOD PRESSURE: 102 MMHG | HEART RATE: 55 BPM | OXYGEN SATURATION: 98 %

## 2022-03-15 DIAGNOSIS — Z95.5 S/P CORONARY ARTERY STENT PLACEMENT: ICD-10-CM

## 2022-03-15 DIAGNOSIS — I21.02 ST ELEVATION MYOCARDIAL INFARCTION INVOLVING LEFT ANTERIOR DESCENDING (LAD) CORONARY ARTERY: Primary | ICD-10-CM

## 2022-03-15 PROCEDURE — 93798 PHYS/QHP OP CAR RHAB W/ECG: CPT

## 2022-03-17 ENCOUNTER — TREATMENT (OUTPATIENT)
Dept: CARDIAC REHAB | Facility: HOSPITAL | Age: 42
End: 2022-03-17

## 2022-03-17 VITALS — DIASTOLIC BLOOD PRESSURE: 58 MMHG | HEART RATE: 55 BPM | OXYGEN SATURATION: 98 % | SYSTOLIC BLOOD PRESSURE: 102 MMHG

## 2022-03-17 DIAGNOSIS — Z95.5 S/P CORONARY ARTERY STENT PLACEMENT: ICD-10-CM

## 2022-03-17 DIAGNOSIS — I21.02 ST ELEVATION MYOCARDIAL INFARCTION INVOLVING LEFT ANTERIOR DESCENDING (LAD) CORONARY ARTERY: Primary | ICD-10-CM

## 2022-03-17 PROCEDURE — 93798 PHYS/QHP OP CAR RHAB W/ECG: CPT

## 2022-03-22 ENCOUNTER — TREATMENT (OUTPATIENT)
Dept: CARDIAC REHAB | Facility: HOSPITAL | Age: 42
End: 2022-03-22

## 2022-03-22 VITALS — HEART RATE: 58 BPM | SYSTOLIC BLOOD PRESSURE: 108 MMHG | DIASTOLIC BLOOD PRESSURE: 60 MMHG | OXYGEN SATURATION: 98 %

## 2022-03-22 DIAGNOSIS — I21.02 ST ELEVATION MYOCARDIAL INFARCTION INVOLVING LEFT ANTERIOR DESCENDING (LAD) CORONARY ARTERY: Primary | ICD-10-CM

## 2022-03-22 DIAGNOSIS — Z95.5 S/P CORONARY ARTERY STENT PLACEMENT: ICD-10-CM

## 2022-03-22 PROCEDURE — 93798 PHYS/QHP OP CAR RHAB W/ECG: CPT

## 2022-03-22 NOTE — PROGRESS NOTES
Pt attended phase II visit.  Tolerated exercise well, NAD noted, no complaints voiced, skin warm, pink, dry, resp nl and even, denies chest discomfort, denies SOA.   Monitor shows SB-NSR with BBB noted ,  Lifevest noted V/S WDL ,see [place] documentation for exercise data.  Dr. Forte   physician immediately available

## 2022-03-24 ENCOUNTER — TREATMENT (OUTPATIENT)
Dept: CARDIAC REHAB | Facility: HOSPITAL | Age: 42
End: 2022-03-24

## 2022-03-24 VITALS — OXYGEN SATURATION: 98 % | HEART RATE: 51 BPM | DIASTOLIC BLOOD PRESSURE: 60 MMHG | SYSTOLIC BLOOD PRESSURE: 110 MMHG

## 2022-03-24 DIAGNOSIS — Z95.5 S/P CORONARY ARTERY STENT PLACEMENT: ICD-10-CM

## 2022-03-24 DIAGNOSIS — I21.02 ST ELEVATION MYOCARDIAL INFARCTION INVOLVING LEFT ANTERIOR DESCENDING (LAD) CORONARY ARTERY: Primary | ICD-10-CM

## 2022-03-24 PROCEDURE — 93798 PHYS/QHP OP CAR RHAB W/ECG: CPT

## 2022-03-24 NOTE — PROGRESS NOTES
Pt attended phase II visit.  Tolerated exercise well, NAD noted, no complaints voiced, skin warm, pink, dry, resp nl and even, denies chest discomfort, denies SOA.   Monitor shows SB-NSR with BBB noted ,  Lifevest noted V/S WDL ,see [place] documentation for exercise data.  Dr. Forte   physician immediately available

## 2022-03-29 ENCOUNTER — TREATMENT (OUTPATIENT)
Dept: CARDIAC REHAB | Facility: HOSPITAL | Age: 42
End: 2022-03-29

## 2022-03-29 VITALS — HEART RATE: 55 BPM | SYSTOLIC BLOOD PRESSURE: 102 MMHG | OXYGEN SATURATION: 98 % | DIASTOLIC BLOOD PRESSURE: 58 MMHG

## 2022-03-29 DIAGNOSIS — I21.02 ST ELEVATION MYOCARDIAL INFARCTION INVOLVING LEFT ANTERIOR DESCENDING (LAD) CORONARY ARTERY: Primary | ICD-10-CM

## 2022-03-29 DIAGNOSIS — Z95.5 S/P CORONARY ARTERY STENT PLACEMENT: ICD-10-CM

## 2022-03-29 PROCEDURE — 93798 PHYS/QHP OP CAR RHAB W/ECG: CPT

## 2022-03-31 ENCOUNTER — TREATMENT (OUTPATIENT)
Dept: CARDIAC REHAB | Facility: HOSPITAL | Age: 42
End: 2022-03-31

## 2022-03-31 VITALS — SYSTOLIC BLOOD PRESSURE: 110 MMHG | OXYGEN SATURATION: 98 % | HEART RATE: 57 BPM | DIASTOLIC BLOOD PRESSURE: 60 MMHG

## 2022-03-31 DIAGNOSIS — I21.02 ST ELEVATION MYOCARDIAL INFARCTION INVOLVING LEFT ANTERIOR DESCENDING (LAD) CORONARY ARTERY: Primary | ICD-10-CM

## 2022-03-31 DIAGNOSIS — Z95.5 S/P CORONARY ARTERY STENT PLACEMENT: ICD-10-CM

## 2022-03-31 PROCEDURE — 93798 PHYS/QHP OP CAR RHAB W/ECG: CPT

## 2022-03-31 NOTE — PROGRESS NOTES
Pt attended phase II visit.  Tolerated exercise well, NAD noted, no complaints voiced, skin warm, pink, dry, resp nl and even, denies chest discomfort, denies SOA.   Monitor shows SB-NSR with BBB noted ,  Lifevest noted V/S WDL ,see [facility] documentation for exercise data.  Dr. Chandler   physician immediately available       
No

## 2022-04-05 ENCOUNTER — TREATMENT (OUTPATIENT)
Dept: CARDIAC REHAB | Facility: HOSPITAL | Age: 42
End: 2022-04-05

## 2022-04-05 VITALS — HEART RATE: 80 BPM | OXYGEN SATURATION: 98 % | DIASTOLIC BLOOD PRESSURE: 58 MMHG | SYSTOLIC BLOOD PRESSURE: 100 MMHG

## 2022-04-05 DIAGNOSIS — Z95.5 S/P CORONARY ARTERY STENT PLACEMENT: ICD-10-CM

## 2022-04-05 DIAGNOSIS — I21.02 ST ELEVATION MYOCARDIAL INFARCTION INVOLVING LEFT ANTERIOR DESCENDING (LAD) CORONARY ARTERY: Primary | ICD-10-CM

## 2022-04-05 PROCEDURE — 93798 PHYS/QHP OP CAR RHAB W/ECG: CPT

## 2022-04-05 NOTE — PROGRESS NOTES
Pt attended phase II visit.  Tolerated exercise well, NAD noted, no complaints voiced, skin warm, pink, dry, resp nl and even, denies chest discomfort, denies SOA.   Monitor shows SB-NSR with BBB noted ,  Life[employer] noted V/S WDL ,see [facility] documentation for exercise data.  Dr. Forte   physician immediately available     Patient to have Defib placed on 4/7/22

## 2022-04-07 ENCOUNTER — APPOINTMENT (OUTPATIENT)
Dept: CARDIAC REHAB | Facility: HOSPITAL | Age: 42
End: 2022-04-07

## 2022-04-12 ENCOUNTER — APPOINTMENT (OUTPATIENT)
Dept: CARDIAC REHAB | Facility: HOSPITAL | Age: 42
End: 2022-04-12

## 2022-04-14 ENCOUNTER — APPOINTMENT (OUTPATIENT)
Dept: CARDIAC REHAB | Facility: HOSPITAL | Age: 42
End: 2022-04-14

## 2022-04-19 ENCOUNTER — APPOINTMENT (OUTPATIENT)
Dept: CARDIAC REHAB | Facility: HOSPITAL | Age: 42
End: 2022-04-19

## 2022-04-21 ENCOUNTER — APPOINTMENT (OUTPATIENT)
Dept: CARDIAC REHAB | Facility: HOSPITAL | Age: 42
End: 2022-04-21

## 2022-04-22 ENCOUNTER — DOCUMENTATION (OUTPATIENT)
Dept: CARDIAC REHAB | Facility: HOSPITAL | Age: 42
End: 2022-04-22

## 2022-04-22 NOTE — PROGRESS NOTES
Patient called and stated due to copay and distance his cardiologist has agreed to let him go to a gym in his hometown since he has the ICD placed.

## 2022-04-26 ENCOUNTER — APPOINTMENT (OUTPATIENT)
Dept: CARDIAC REHAB | Facility: HOSPITAL | Age: 42
End: 2022-04-26

## 2022-05-06 ENCOUNTER — TELEPHONE (OUTPATIENT)
Dept: FAMILY MEDICINE CLINIC | Facility: CLINIC | Age: 42
End: 2022-05-06

## 2022-05-06 RX ORDER — AMOXICILLIN AND CLAVULANATE POTASSIUM 875; 125 MG/1; MG/1
1 TABLET, FILM COATED ORAL 2 TIMES DAILY
Qty: 20 TABLET | Refills: 0 | Status: SHIPPED | OUTPATIENT
Start: 2022-05-06 | End: 2022-07-22

## 2022-05-06 NOTE — TELEPHONE ENCOUNTER
----- Message from NANCY Jessica sent at 5/6/2022 11:42 AM EDT -----  Sent    ----- Message -----  From: Doris Figueroa MA  Sent: 5/6/2022  11:20 AM EDT  To: NANCY Jessica    Patient calling, wants something for sinus infection.

## 2022-07-22 ENCOUNTER — OFFICE VISIT (OUTPATIENT)
Dept: FAMILY MEDICINE CLINIC | Facility: CLINIC | Age: 42
End: 2022-07-22

## 2022-07-22 VITALS
DIASTOLIC BLOOD PRESSURE: 80 MMHG | OXYGEN SATURATION: 99 % | HEART RATE: 64 BPM | BODY MASS INDEX: 30.66 KG/M2 | WEIGHT: 219 LBS | SYSTOLIC BLOOD PRESSURE: 122 MMHG | TEMPERATURE: 97.3 F | HEIGHT: 71 IN

## 2022-07-22 DIAGNOSIS — K21.9 GASTROESOPHAGEAL REFLUX DISEASE WITHOUT ESOPHAGITIS: Chronic | ICD-10-CM

## 2022-07-22 DIAGNOSIS — I25.10 CORONARY ARTERY DISEASE INVOLVING NATIVE CORONARY ARTERY OF NATIVE HEART WITHOUT ANGINA PECTORIS: Chronic | ICD-10-CM

## 2022-07-22 DIAGNOSIS — I50.21 ACUTE SYSTOLIC HEART FAILURE: Chronic | ICD-10-CM

## 2022-07-22 DIAGNOSIS — R00.2 PALPITATIONS: Primary | ICD-10-CM

## 2022-07-22 DIAGNOSIS — E78.5 DYSLIPIDEMIA: Chronic | ICD-10-CM

## 2022-07-22 PROCEDURE — 93005 ELECTROCARDIOGRAM TRACING: CPT | Performed by: PHYSICIAN ASSISTANT

## 2022-07-22 PROCEDURE — 99214 OFFICE O/P EST MOD 30 MIN: CPT | Performed by: PHYSICIAN ASSISTANT

## 2022-07-22 NOTE — PROGRESS NOTES
"Subjective   George Ribeiro is a 42 y.o. male.       Chief Complaint -palpitations    History of Present Illness -    ROS    Palpitations-  Patient complains of palpitations that occurred after moving furniture and being more active than usual day before yesterday.  He does have a history of coronary artery disease status post 3 stent placement on 11/26/2021.  Patient has congestive heart failure and is status post defibrillator placement due to V. fib in the past.  The patient reports last echocardiogram at [facility] showed left ventricular ejection fraction 37%.  He is followed by Dr. Fontanez at [facility].  Patient states that his cardiologist is mailing him a 30-day event monitor but asked him to come into the office today for EKG and evaluation.  Patient denies any chest pain, dizziness, near syncope, shortness of breath or palpitations today.    Dyslipidemia-  Stable with rosuvastatin and low-cholesterol diet    Gastroesophageal reflux disease-stable with dietary modification    The following portions of the patient's history were reviewed and updated as appropriate: allergies, current medications, past family history, past medical history, past social history, past surgical history and problem list.    Review of Systems    Objective  Vital signs:  /80   Pulse 64   Temp 97.3 °F (36.3 °C) (Temporal)   Ht 180.3 cm (70.98\")   Wt 99.3 kg (219 lb)   SpO2 99%   BMI 30.56 kg/m²     Physical Exam  Vitals and nursing note reviewed.   Constitutional:       Appearance: Normal appearance. He is well-developed.   Eyes:      Extraocular Movements: Extraocular movements intact.      Conjunctiva/sclera: Conjunctivae normal.   Cardiovascular:      Rate and Rhythm: Normal rate and regular rhythm.      Heart sounds: Normal heart sounds. No murmur heard.  Pulmonary:      Effort: Pulmonary effort is normal. No respiratory distress.      Breath sounds: Normal breath sounds. No wheezing.   Musculoskeletal:         " General: No tenderness.   Skin:     General: Skin is warm and dry.      Findings: No rash.   Neurological:      Mental Status: He is alert and oriented to person, place, and time.   Psychiatric:         Mood and Affect: Mood normal.         Behavior: Behavior normal.         Thought Content: Thought content normal.         The following data was reviewed by: ADE Mccoy on 07/22/2022:  CMP    CMP 8/23/21   Glucose 85   BUN 14   Creatinine 0.65 (A)   eGFR Non African Am 135   Sodium 138   Potassium 4.1   Chloride 102   Calcium 9.9   Albumin 5.00   Total Bilirubin 0.6   Alkaline Phosphatase 69   AST (SGOT) 28   ALT (SGPT) 26   (A) Abnormal value            CBC w/diff    CBC w/Diff 8/23/21   WBC 6.73   RBC 5.31   Hemoglobin 15.6   Hematocrit 45.8   MCV 86.3   MCH 29.4   MCHC 34.1   RDW 12.6   Platelets 316   Neutrophil Rel % 52.6   Immature Granulocyte Rel % 0.3   Lymphocyte Rel % 37.0   Monocyte Rel % 8.0   Eosinophil Rel % 1.2   Basophil Rel % 0.9           Lipid Panel    Lipid Panel 8/23/21   Total Cholesterol 219 (A)   Triglycerides 158 (A)   HDL Cholesterol 41   VLDL Cholesterol 29   LDL Cholesterol  149 (A)   LDL/HDL Ratio 3.57   (A) Abnormal value            TSH    TSH 8/23/21   TSH 5.300 (A)   (A) Abnormal value            Most Recent A1C    HGBA1C Most Recent 8/23/21   Hemoglobin A1C 5.30                  Assessment & Plan     Diagnoses and all orders for this visit:    1. Palpitations (Primary)  Comments:  EKG reviewed today with patient  EKG is abnormal but stable at this time  Advised to do 30-day event monitor  Discussed s/s appropriate to go to ER  Orders:  -     CBC & Differential; Future  -     Comprehensive Metabolic Panel; Future  -     TSH; Future  -     ECG 12 Lead    2. Dyslipidemia  Comments:  Advised low-cholesterol diet  Continue rosuvastatin  Orders:  -     Lipid Panel; Future    3. Gastroesophageal reflux disease without esophagitis  Comments:  Advised to avoid known trigger  foods    4. Acute systolic heart failure (HCC)  Comments:  Continue Entresto  Continue to monitor    5. Coronary artery disease involving native coronary artery of native heart without angina pectoris  Comments:  Status post STEMI to the LAD requiring 3 stent placement November 2021  Conservative measures advised including rosuvastatin    July 22, 2022 at 4:45 PM  EKG  Performed by: Terrie Matthew CMA  Authorized by: Zully Mckenzie PA-C  Indication: Palpitations  Comparison: Not compared with previous EKG  No previous EKG available  Rhythm: Sinus bradycardia  Rate: Cardiac at ventricular rate 53 bpm  Clinical impression: Poor R wave progression noted with ST depression in anterior leads suggestive of previous anteroseptal MI.          Patient was given instructions and counseling regarding his condition or for health maintenance advice. Please see specific information pulled into the AVS if appropriate      This document has been electronically signed by:  Zully Mckenzie PA-C

## 2022-07-26 ENCOUNTER — LAB (OUTPATIENT)
Dept: FAMILY MEDICINE CLINIC | Facility: CLINIC | Age: 42
End: 2022-07-26

## 2022-07-26 DIAGNOSIS — R00.2 PALPITATIONS: ICD-10-CM

## 2022-07-26 DIAGNOSIS — E78.5 DYSLIPIDEMIA: Chronic | ICD-10-CM

## 2022-07-26 LAB
ALBUMIN SERPL-MCNC: 4.8 G/DL (ref 3.5–5.2)
ALBUMIN/GLOB SERPL: 2.2 G/DL
ALP SERPL-CCNC: 66 U/L (ref 39–117)
ALT SERPL W P-5'-P-CCNC: 30 U/L (ref 1–41)
ANION GAP SERPL CALCULATED.3IONS-SCNC: 10 MMOL/L (ref 5–15)
AST SERPL-CCNC: 29 U/L (ref 1–40)
BASOPHILS # BLD AUTO: 0.04 10*3/MM3 (ref 0–0.2)
BASOPHILS NFR BLD AUTO: 0.8 % (ref 0–1.5)
BILIRUB SERPL-MCNC: 1 MG/DL (ref 0–1.2)
BUN SERPL-MCNC: 12 MG/DL (ref 6–20)
BUN/CREAT SERPL: 14.1 (ref 7–25)
CALCIUM SPEC-SCNC: 9.6 MG/DL (ref 8.6–10.5)
CHLORIDE SERPL-SCNC: 100 MMOL/L (ref 98–107)
CHOLEST SERPL-MCNC: 109 MG/DL (ref 0–200)
CO2 SERPL-SCNC: 28 MMOL/L (ref 22–29)
CREAT SERPL-MCNC: 0.85 MG/DL (ref 0.76–1.27)
DEPRECATED RDW RBC AUTO: 40.5 FL (ref 37–54)
EGFRCR SERPLBLD CKD-EPI 2021: 111.3 ML/MIN/1.73
EOSINOPHIL # BLD AUTO: 0.06 10*3/MM3 (ref 0–0.4)
EOSINOPHIL NFR BLD AUTO: 1.2 % (ref 0.3–6.2)
ERYTHROCYTE [DISTWIDTH] IN BLOOD BY AUTOMATED COUNT: 12.4 % (ref 12.3–15.4)
GLOBULIN UR ELPH-MCNC: 2.2 GM/DL
GLUCOSE SERPL-MCNC: 87 MG/DL (ref 65–99)
HCT VFR BLD AUTO: 44.4 % (ref 37.5–51)
HDLC SERPL-MCNC: 39 MG/DL (ref 40–60)
HGB BLD-MCNC: 14.6 G/DL (ref 13–17.7)
IMM GRANULOCYTES # BLD AUTO: 0.01 10*3/MM3 (ref 0–0.05)
IMM GRANULOCYTES NFR BLD AUTO: 0.2 % (ref 0–0.5)
LDLC SERPL CALC-MCNC: 56 MG/DL (ref 0–100)
LDLC/HDLC SERPL: 1.47 {RATIO}
LYMPHOCYTES # BLD AUTO: 2.06 10*3/MM3 (ref 0.7–3.1)
LYMPHOCYTES NFR BLD AUTO: 40.6 % (ref 19.6–45.3)
MCH RBC QN AUTO: 29.6 PG (ref 26.6–33)
MCHC RBC AUTO-ENTMCNC: 32.9 G/DL (ref 31.5–35.7)
MCV RBC AUTO: 89.9 FL (ref 79–97)
MONOCYTES # BLD AUTO: 0.45 10*3/MM3 (ref 0.1–0.9)
MONOCYTES NFR BLD AUTO: 8.9 % (ref 5–12)
NEUTROPHILS NFR BLD AUTO: 2.46 10*3/MM3 (ref 1.7–7)
NEUTROPHILS NFR BLD AUTO: 48.3 % (ref 42.7–76)
NRBC BLD AUTO-RTO: 0 /100 WBC (ref 0–0.2)
PLATELET # BLD AUTO: 239 10*3/MM3 (ref 140–450)
PMV BLD AUTO: 10.9 FL (ref 6–12)
POTASSIUM SERPL-SCNC: 4 MMOL/L (ref 3.5–5.2)
PROT SERPL-MCNC: 7 G/DL (ref 6–8.5)
RBC # BLD AUTO: 4.94 10*6/MM3 (ref 4.14–5.8)
SODIUM SERPL-SCNC: 138 MMOL/L (ref 136–145)
TRIGL SERPL-MCNC: 64 MG/DL (ref 0–150)
TSH SERPL DL<=0.05 MIU/L-ACNC: 3.43 UIU/ML (ref 0.27–4.2)
VLDLC SERPL-MCNC: 14 MG/DL (ref 5–40)
WBC NRBC COR # BLD: 5.08 10*3/MM3 (ref 3.4–10.8)

## 2022-07-26 PROCEDURE — 80050 GENERAL HEALTH PANEL: CPT | Performed by: PHYSICIAN ASSISTANT

## 2022-07-26 PROCEDURE — 36415 COLL VENOUS BLD VENIPUNCTURE: CPT | Performed by: NURSE PRACTITIONER

## 2022-07-26 PROCEDURE — 80061 LIPID PANEL: CPT | Performed by: PHYSICIAN ASSISTANT

## 2022-08-08 ENCOUNTER — OFFICE VISIT (OUTPATIENT)
Dept: FAMILY MEDICINE CLINIC | Facility: CLINIC | Age: 42
End: 2022-08-08

## 2022-08-08 VITALS
SYSTOLIC BLOOD PRESSURE: 126 MMHG | HEART RATE: 58 BPM | OXYGEN SATURATION: 97 % | DIASTOLIC BLOOD PRESSURE: 80 MMHG | WEIGHT: 221 LBS | TEMPERATURE: 98.2 F | BODY MASS INDEX: 30.94 KG/M2 | HEIGHT: 71 IN

## 2022-08-08 DIAGNOSIS — F40.10 SOCIAL ANXIETY DISORDER: ICD-10-CM

## 2022-08-08 DIAGNOSIS — E78.5 DYSLIPIDEMIA: ICD-10-CM

## 2022-08-08 DIAGNOSIS — I50.22 CHRONIC SYSTOLIC HEART FAILURE: ICD-10-CM

## 2022-08-08 DIAGNOSIS — I10 ESSENTIAL HYPERTENSION: Primary | ICD-10-CM

## 2022-08-08 PROCEDURE — 99214 OFFICE O/P EST MOD 30 MIN: CPT | Performed by: NURSE PRACTITIONER

## 2022-08-08 RX ORDER — CITALOPRAM 40 MG/1
40 TABLET ORAL DAILY
Qty: 30 TABLET | Refills: 5 | Status: SHIPPED | OUTPATIENT
Start: 2022-08-08 | End: 2022-11-08 | Stop reason: SDUPTHER

## 2022-08-08 NOTE — ASSESSMENT & PLAN NOTE
Continue Celexa.    Stress reduction advised (examples:  make time for self, Reading, Listening to music, Exercise, keeping a journal, venting to a confidant, etc.)

## 2022-08-08 NOTE — PROGRESS NOTES
"Subjective   George Ribeiro is a 42 y.o. male.     Chief Complaint   Patient presents with   • Palpitations     Follow up         History of Present Illness     Follow-up for palpitation- patient is here today for follow-up for palpitations that he experienced for approx 2 days.  He reports that previous to the event he had been overactive.  He is currently on metoprolol 25 mg.  He is followed by cardiology (Dr Vivar).  He is currently wearing a 30 day monitor that was sent from [facility].  He reports it will come of \"at the end of the month\".  Once that is reviewed the plan for care will be discussed.  He does have a monitoring device that sends information back to the clinic where the monitor is from.  He reports they have not contacted him about any observed events.      HF and HTN-ongoing.  Patient is currently anticoagulated on Plavix 75 mg and Xarelto 20 mg.  He is on Entresto 24-26 mg.  He is also on metoprolol 25 mg.  He has a history of MI with stenting x3.  He does weigh daily for his CHF.   He reports that he is aware to report any gain more than 3# daily.  He has had an ICD placement.   Depression and anxiety-ongoing.  Patient is currently on Celexa 40 mg.  Reports that his symptoms seem to be doing well at this time.  He does need a medication refill.    Cholesterol follow up-on [employer] for his LDL. He continues to try to work on diet changes.   He has cut red meat.  He is eating more fish.  More nuts,  Less white bread, usually wheat if he eats it.  No white potatoes.  He reports his last LDL at [facility] was 81.      The following portions of the patient's history were reviewed and updated as appropriate: CC, ROS, allergies, current medications, past family history, past medical history, past social history, past surgical history and problem list.    Review of Systems   Constitutional: Negative for activity change, appetite change, fatigue and fever.   HENT: Negative for congestion, ear pain, facial " "swelling, nosebleeds, rhinorrhea, sinus pressure, sore throat and trouble swallowing.    Eyes: Negative for blurred vision, double vision and redness.   Respiratory: Negative for cough, chest tightness, shortness of breath and wheezing.    Cardiovascular: Negative for chest pain, palpitations and leg swelling.        Chest wall pain on the left.  Occurs very intermittently.  Last 1-2 seconds.  Not of new onset.  cardiology is aware.   Gastrointestinal: Negative for abdominal pain, blood in stool, constipation, diarrhea, nausea and vomiting.   Endocrine: Negative.    Genitourinary: Negative for dysuria, flank pain, frequency, hematuria and urgency.   Musculoskeletal: Negative for arthralgias, myalgias and bursitis.   Skin: Negative.    Allergic/Immunologic: Negative.    Neurological: Negative for dizziness, syncope, facial asymmetry, weakness, light-headedness and headache.   Hematological: Negative.    Psychiatric/Behavioral: Positive for depressed mood and stress. Negative for self-injury, sleep disturbance and suicidal ideas. The patient is nervous/anxious.    All other systems reviewed and are negative.      Objective     /80   Pulse 58   Temp 98.2 °F (36.8 °C)   Ht 180.3 cm (70.98\")   Wt 100 kg (221 lb)   SpO2 97%   BMI 30.84 kg/m²     Physical Exam  Vitals reviewed.   Constitutional:       General: He is not in acute distress.     Appearance: He is well-developed. He is not diaphoretic.   HENT:      Head: Normocephalic and atraumatic.      Jaw: No tenderness.      Comments: Oropharynx not examined.  Patient is presently wearing a face covering/mask due to COVID-19 pandemic.     Right Ear: Hearing, tympanic membrane, ear canal and external ear normal.      Left Ear: Hearing, tympanic membrane, ear canal and external ear normal.   Eyes:      General: Lids are normal. No scleral icterus.     Extraocular Movements:      Right eye: Normal extraocular motion and no nystagmus.      Left eye: Normal " extraocular motion and no nystagmus.      Conjunctiva/sclera: Conjunctivae normal.      Pupils: Pupils are equal, round, and reactive to light.   Neck:      Thyroid: No thyromegaly or thyroid tenderness.      Vascular: No carotid bruit or JVD.      Trachea: No tracheal tenderness.   Cardiovascular:      Rate and Rhythm: Normal rate and regular rhythm.      Pulses:           Dorsalis pedis pulses are 2+ on the right side and 2+ on the left side.        Posterior tibial pulses are 2+ on the right side and 2+ on the left side.      Heart sounds: Normal heart sounds, S1 normal and S2 normal. No murmur heard.  Pulmonary:      Effort: Pulmonary effort is normal. No accessory muscle usage, prolonged expiration or respiratory distress.      Breath sounds: Normal breath sounds.   Chest:      Chest wall: No tenderness.   Abdominal:      General: Bowel sounds are normal. There is no distension.      Palpations: Abdomen is soft. There is no mass.      Tenderness: There is no abdominal tenderness.   Musculoskeletal:         General: Tenderness present.      Cervical back: Normal range of motion and neck supple.      Right lower leg: No edema.      Left lower leg: No edema.      Comments: No muscular atrophy or flaccidity.   Lymphadenopathy:      Head:      Right side of head: No submental or submandibular adenopathy.      Left side of head: No submental or submandibular adenopathy.      Cervical: No cervical adenopathy.      Right cervical: No superficial cervical adenopathy.     Left cervical: No superficial cervical adenopathy.   Skin:     General: Skin is warm and dry.      Capillary Refill: Capillary refill takes less than 2 seconds.      Coloration: Skin is not jaundiced or pale.      Findings: No erythema.      Nails: There is no clubbing.   Neurological:      Mental Status: He is alert and oriented to person, place, and time.      Cranial Nerves: No cranial nerve deficit or facial asymmetry.      Sensory: No sensory  deficit.      Motor: No weakness, tremor, atrophy or abnormal muscle tone.      Coordination: Coordination normal.      Gait: Gait normal.      Deep Tendon Reflexes: Reflexes are normal and symmetric.   Psychiatric:         Attention and Perception: He is attentive.         Mood and Affect: Mood normal.         Speech: Speech normal.         Behavior: Behavior normal. Behavior is cooperative.         Thought Content: Thought content normal.         Judgment: Judgment normal.           Diagnoses and all orders for this visit:    1. Essential hypertension (Primary)  Assessment & Plan:  Continue Metoprolol 25 mg       2. Social anxiety disorder  Comments:  Continue Celexa  Assessment & Plan:  Continue Celexa.    Stress reduction advised (examples:  make time for self, Reading, Listening to music, Exercise, keeping a journal, venting to a confidant, etc.)      Orders:  -     citalopram (CeleXA) 40 MG tablet; Take 1 tablet by mouth Daily.  Dispense: 30 tablet; Refill: 5    3. Dyslipidemia  Assessment & Plan:  Continue Crestor 40 mg and Praluent 75 mg as directed.  Continue to work on diet changes.      4. Chronic systolic heart failure (HCC)  Comments:  continue under the care of Cardiology  continue Entresto  continue daily weights  Overview:  EF 35 to 40% with severe hypokinesia of the LAD  Doppler flow suggestive of grade 1 diastolic dysfunction         BMI is >= 30 and <35. (Class 1 Obesity). The following options were offered after discussion;: nutrition counseling/recommendations       Understands disease processes and need for medications.  Understands reasons for urgent and emergent care.  Patient (& family) verbalized agreement for treatment plan.   Emotional support and active listening provided.  Patient provided time to verbalize feelings.    Reviewed most recent labs with patient.  Discussed any abnormal findings.  Patient's questions answered.    RTC 3 months, sooner if needed.           This document has  been electronically signed by:  NANCY Jessica, CHUCKC    Dragon disclaimer:  Part of this note may be an electronic transcription/translation of spoken language to printed text using the Dragon Dictation System.

## 2022-11-08 ENCOUNTER — OFFICE VISIT (OUTPATIENT)
Dept: FAMILY MEDICINE CLINIC | Facility: CLINIC | Age: 42
End: 2022-11-08

## 2022-11-08 VITALS
HEART RATE: 62 BPM | BODY MASS INDEX: 31.89 KG/M2 | RESPIRATION RATE: 14 BRPM | OXYGEN SATURATION: 98 % | HEIGHT: 71 IN | DIASTOLIC BLOOD PRESSURE: 78 MMHG | TEMPERATURE: 98 F | SYSTOLIC BLOOD PRESSURE: 124 MMHG | WEIGHT: 227.8 LBS

## 2022-11-08 DIAGNOSIS — I10 ESSENTIAL HYPERTENSION: ICD-10-CM

## 2022-11-08 DIAGNOSIS — Z00.00 VISIT FOR ANNUAL HEALTH EXAMINATION: Primary | ICD-10-CM

## 2022-11-08 DIAGNOSIS — Z23 ENCOUNTER FOR IMMUNIZATION: ICD-10-CM

## 2022-11-08 DIAGNOSIS — F40.10 SOCIAL ANXIETY DISORDER: ICD-10-CM

## 2022-11-08 DIAGNOSIS — E78.5 DYSLIPIDEMIA: ICD-10-CM

## 2022-11-08 DIAGNOSIS — I50.22 CHRONIC SYSTOLIC HEART FAILURE: ICD-10-CM

## 2022-11-08 DIAGNOSIS — E55.9 VITAMIN D DEFICIENCY: ICD-10-CM

## 2022-11-08 LAB
25(OH)D3 SERPL-MCNC: 42.3 NG/ML (ref 30–100)
ALBUMIN SERPL-MCNC: 4.7 G/DL (ref 3.5–5.2)
ALBUMIN/GLOB SERPL: 2 G/DL
ALP SERPL-CCNC: 60 U/L (ref 39–117)
ALT SERPL W P-5'-P-CCNC: 22 U/L (ref 1–41)
ANION GAP SERPL CALCULATED.3IONS-SCNC: 10 MMOL/L (ref 5–15)
AST SERPL-CCNC: 24 U/L (ref 1–40)
BASOPHILS # BLD AUTO: 0.04 10*3/MM3 (ref 0–0.2)
BASOPHILS NFR BLD AUTO: 0.7 % (ref 0–1.5)
BILIRUB SERPL-MCNC: 0.7 MG/DL (ref 0–1.2)
BUN SERPL-MCNC: 13 MG/DL (ref 6–20)
BUN/CREAT SERPL: 18.1 (ref 7–25)
CALCIUM SPEC-SCNC: 9.6 MG/DL (ref 8.6–10.5)
CHLORIDE SERPL-SCNC: 103 MMOL/L (ref 98–107)
CHOLEST SERPL-MCNC: 143 MG/DL (ref 0–200)
CO2 SERPL-SCNC: 26 MMOL/L (ref 22–29)
CREAT SERPL-MCNC: 0.72 MG/DL (ref 0.76–1.27)
DEPRECATED RDW RBC AUTO: 39 FL (ref 37–54)
EGFRCR SERPLBLD CKD-EPI 2021: 117 ML/MIN/1.73
EOSINOPHIL # BLD AUTO: 0.05 10*3/MM3 (ref 0–0.4)
EOSINOPHIL NFR BLD AUTO: 0.9 % (ref 0.3–6.2)
ERYTHROCYTE [DISTWIDTH] IN BLOOD BY AUTOMATED COUNT: 12 % (ref 12.3–15.4)
GLOBULIN UR ELPH-MCNC: 2.4 GM/DL
GLUCOSE SERPL-MCNC: 96 MG/DL (ref 65–99)
HCT VFR BLD AUTO: 45.7 % (ref 37.5–51)
HDLC SERPL-MCNC: 47 MG/DL (ref 40–60)
HGB BLD-MCNC: 15.4 G/DL (ref 13–17.7)
IMM GRANULOCYTES # BLD AUTO: 0.01 10*3/MM3 (ref 0–0.05)
IMM GRANULOCYTES NFR BLD AUTO: 0.2 % (ref 0–0.5)
LDLC SERPL CALC-MCNC: 80 MG/DL (ref 0–100)
LDLC/HDLC SERPL: 1.7 {RATIO}
LYMPHOCYTES # BLD AUTO: 1.83 10*3/MM3 (ref 0.7–3.1)
LYMPHOCYTES NFR BLD AUTO: 34.1 % (ref 19.6–45.3)
MCH RBC QN AUTO: 30 PG (ref 26.6–33)
MCHC RBC AUTO-ENTMCNC: 33.7 G/DL (ref 31.5–35.7)
MCV RBC AUTO: 88.9 FL (ref 79–97)
MONOCYTES # BLD AUTO: 0.39 10*3/MM3 (ref 0.1–0.9)
MONOCYTES NFR BLD AUTO: 7.3 % (ref 5–12)
NEUTROPHILS NFR BLD AUTO: 3.05 10*3/MM3 (ref 1.7–7)
NEUTROPHILS NFR BLD AUTO: 56.8 % (ref 42.7–76)
NRBC BLD AUTO-RTO: 0 /100 WBC (ref 0–0.2)
PLATELET # BLD AUTO: 236 10*3/MM3 (ref 140–450)
PMV BLD AUTO: 10.6 FL (ref 6–12)
POTASSIUM SERPL-SCNC: 4.1 MMOL/L (ref 3.5–5.2)
PROT SERPL-MCNC: 7.1 G/DL (ref 6–8.5)
RBC # BLD AUTO: 5.14 10*6/MM3 (ref 4.14–5.8)
SODIUM SERPL-SCNC: 139 MMOL/L (ref 136–145)
T4 FREE SERPL-MCNC: 1.15 NG/DL (ref 0.93–1.7)
TRIGL SERPL-MCNC: 81 MG/DL (ref 0–150)
TSH SERPL DL<=0.05 MIU/L-ACNC: 2.55 UIU/ML (ref 0.27–4.2)
VIT B12 BLD-MCNC: 495 PG/ML (ref 211–946)
VLDLC SERPL-MCNC: 16 MG/DL (ref 5–40)
WBC NRBC COR # BLD: 5.37 10*3/MM3 (ref 3.4–10.8)

## 2022-11-08 PROCEDURE — 90677 PCV20 VACCINE IM: CPT | Performed by: NURSE PRACTITIONER

## 2022-11-08 PROCEDURE — 82306 VITAMIN D 25 HYDROXY: CPT | Performed by: NURSE PRACTITIONER

## 2022-11-08 PROCEDURE — 90471 IMMUNIZATION ADMIN: CPT | Performed by: NURSE PRACTITIONER

## 2022-11-08 PROCEDURE — 84439 ASSAY OF FREE THYROXINE: CPT | Performed by: NURSE PRACTITIONER

## 2022-11-08 PROCEDURE — 80050 GENERAL HEALTH PANEL: CPT | Performed by: NURSE PRACTITIONER

## 2022-11-08 PROCEDURE — 80061 LIPID PANEL: CPT | Performed by: NURSE PRACTITIONER

## 2022-11-08 PROCEDURE — 99396 PREV VISIT EST AGE 40-64: CPT | Performed by: NURSE PRACTITIONER

## 2022-11-08 PROCEDURE — 82607 VITAMIN B-12: CPT | Performed by: NURSE PRACTITIONER

## 2022-11-08 PROCEDURE — 83036 HEMOGLOBIN GLYCOSYLATED A1C: CPT | Performed by: NURSE PRACTITIONER

## 2022-11-08 PROCEDURE — 36415 COLL VENOUS BLD VENIPUNCTURE: CPT | Performed by: NURSE PRACTITIONER

## 2022-11-08 RX ORDER — CITALOPRAM 40 MG/1
40 TABLET ORAL DAILY
Qty: 30 TABLET | Refills: 5 | Status: SHIPPED | OUTPATIENT
Start: 2022-11-08

## 2022-11-08 RX ORDER — TIRZEPATIDE 2.5 MG/.5ML
INJECTION, SOLUTION SUBCUTANEOUS
COMMUNITY
Start: 2022-10-25 | End: 2022-11-08

## 2022-11-08 NOTE — ASSESSMENT & PLAN NOTE
Continue Entresto 24-26  Report any symptoms of shortness of breath or any evidence of fluid retention.

## 2022-11-08 NOTE — PATIENT INSTRUCTIONS
It is important for your health to eat a healthy balanced diet, to exercise as tolerated, obtain dental and vision checkups routinely, work on stress reduction and be active about taking care of your mental health.  Routine age related immunizations(pneumonia, flu, shingles if applicable) are recommended.  Be active in obtaining age and gender routine maintenance exams (such as paps, breast exams, colonscopy, prostate exams, etc).    You are encouraged to have safe sex practices for STD prevention.    Be alert/educated on gun and water safety, seek help for any domestic violence concerns, and seatbelt use is strongly encouraged.

## 2022-11-08 NOTE — PROGRESS NOTES
"Subjective   George Ribeiro is a 42 y.o. male.     Chief Complaint   Patient presents with   • Annual Exam       History of Present Illness     Annual physical-patient is here for an updated annual physical.   History of MI-patient with ICD placement.  He is currently on Plavix 75 mg and Entresto 24-26 mg.  He is also on metoprolol 25 mg daily.  No negative side effects.  He had a follow up in September and will have a follow up in April.  His recent monitor questionable A Fib but EP provider has ruled that out.   He reports an episode of dizziness yesterday with bend to stand but that is the first time in \"forever\".    Occasional CP but only a second and \"gone\".  He does have Nitro to use.    Hyperlipidemia-patient is currently on Crestor 40 mg.  No negative side effects.  He is also on Praluent 75 mg injection every 2 weeks.  He reports he is tolerating well.    Depression and anxiety-patient on Celexa 40 mg.  He reports that he is doing well.  No negative side effects of medications.   Hip Pain-left.  He reports for a year or more.  He reports only hurts when he sits.  No pain with activity.  He reports that pain is aching.  \"discomforting\".   He has not used any treatments for the pain.  He reports some intermittent association with LBP.  Initially occurred intermittently but now He reports that it has increased to \"every time I sit\".  He denies warmth to touch.  No soreness.  Sensation is a deep pain.  No leg numbness.  No fall or injury.     The following portions of the patient's history were reviewed and updated as appropriate: CC, ROS, allergies, current medications, past family history, past medical history, past social history, past surgical history and problem list.    Review of Systems   Constitutional: Positive for fatigue. Negative for activity change, appetite change and fever.   HENT: Negative for congestion, ear pain, facial swelling, nosebleeds, rhinorrhea, sinus pressure, sore throat and " "trouble swallowing.    Eyes: Negative for blurred vision, double vision and redness.   Respiratory: Negative for cough, chest tightness, shortness of breath and wheezing.    Cardiovascular: Negative for chest pain, palpitations and leg swelling.   Gastrointestinal: Negative for abdominal pain, blood in stool, constipation, diarrhea, nausea and vomiting.   Endocrine: Negative.    Genitourinary: Negative for dysuria, flank pain, frequency, hematuria and urgency.   Musculoskeletal: Positive for arthralgias and myalgias.   Skin: Negative.    Allergic/Immunologic: Negative.    Neurological: Negative for dizziness, weakness, light-headedness and headache.   Hematological: Negative.    Psychiatric/Behavioral: Negative for self-injury, sleep disturbance and suicidal ideas. The patient is not nervous/anxious.    All other systems reviewed and are negative.      Objective     /78   Pulse 62   Temp 98 °F (36.7 °C)   Resp 14   Ht 180.3 cm (70.98\")   Wt 103 kg (227 lb 12.8 oz)   SpO2 98%   BMI 31.79 kg/m²     Physical Exam  Vitals reviewed.   Constitutional:       General: He is not in acute distress.     Appearance: He is well-developed. He is obese. He is not diaphoretic.   HENT:      Head: Normocephalic and atraumatic.      Jaw: No tenderness.      Comments: Oropharynx not examined.  Patient is presently wearing a face covering/mask due to COVID-19 pandemic.     Right Ear: Hearing, tympanic membrane, ear canal and external ear normal.      Left Ear: Hearing, tympanic membrane, ear canal and external ear normal.   Eyes:      General: Lids are normal. No scleral icterus.     Extraocular Movements:      Right eye: Normal extraocular motion and no nystagmus.      Left eye: Normal extraocular motion and no nystagmus.      Conjunctiva/sclera: Conjunctivae normal.      Pupils: Pupils are equal, round, and reactive to light.   Neck:      Thyroid: No thyromegaly or thyroid tenderness.      Vascular: No carotid bruit or " JVD.      Trachea: No tracheal tenderness.   Cardiovascular:      Rate and Rhythm: Normal rate and regular rhythm.      Pulses:           Dorsalis pedis pulses are 2+ on the right side and 2+ on the left side.        Posterior tibial pulses are 2+ on the right side and 2+ on the left side.      Heart sounds: Normal heart sounds, S1 normal and S2 normal. No murmur heard.  Pulmonary:      Effort: Pulmonary effort is normal. No accessory muscle usage, prolonged expiration or respiratory distress.      Breath sounds: Normal breath sounds.   Chest:      Chest wall: No tenderness.   Abdominal:      General: Bowel sounds are normal. There is no distension.      Palpations: Abdomen is soft. There is no mass.      Tenderness: There is no abdominal tenderness.   Musculoskeletal:         General: Tenderness present.      Cervical back: Normal range of motion and neck supple.      Right lower leg: No edema.      Left lower leg: No edema.      Comments: No muscular atrophy or flaccidity.  Hip pain not reproducible on exam   Lymphadenopathy:      Head:      Right side of head: No submental or submandibular adenopathy.      Left side of head: No submental or submandibular adenopathy.      Cervical: No cervical adenopathy.      Right cervical: No superficial cervical adenopathy.     Left cervical: No superficial cervical adenopathy.   Skin:     General: Skin is warm and dry.      Capillary Refill: Capillary refill takes less than 2 seconds.      Coloration: Skin is not jaundiced or pale.      Findings: No erythema.      Nails: There is no clubbing.   Neurological:      Mental Status: He is alert and oriented to person, place, and time.      Cranial Nerves: No cranial nerve deficit or facial asymmetry.      Sensory: No sensory deficit.      Motor: No weakness, tremor, atrophy or abnormal muscle tone.      Coordination: Coordination normal.      Gait: Gait normal.      Deep Tendon Reflexes: Reflexes are normal and symmetric.    Psychiatric:         Attention and Perception: He is attentive.         Mood and Affect: Mood normal.         Speech: Speech normal.         Behavior: Behavior normal. Behavior is cooperative.         Thought Content: Thought content normal.         Judgment: Judgment normal.         Assessment & Plan     Vision Screening    Right eye Left eye Both eyes   Without correction      With correction 20/25 20/25 20/25       Functional & Cognitive Status 11/8/2022   Do you have difficulty preparing food and eating? No   Do you have difficulty bathing yourself, getting dressed or grooming yourself? No   Do you have difficulty using the toilet? No   Do you have difficulty moving around from place to place? No   Do you have trouble with steps or getting out of a bed or a chair? No   Current Diet Well Balanced Diet   Dental Exam Up to date   Eye Exam Up to date   Exercise (times per week) 3 times per week   Current Exercises Include Walking   Do you need help using the phone?  No   Are you deaf or do you have serious difficulty hearing?  No   Do you need help with transportation? No   Do you need help shopping? No   Do you need help preparing meals?  No   Do you need help with housework?  No   Do you need help with laundry? No   Do you need help taking your medications? No   Do you need help managing money? No   Do you ever drive or ride in a car without wearing a seat belt? No   Have you felt unusual stress, anger or loneliness in the last month? No   Who do you live with? Spouse   If you need help, do you have trouble finding someone available to you? No   Have you been bothered in the last four weeks by sexual problems? No   Do you have difficulty concentrating, remembering or making decisions? No       PHQ-2 Depression Screening  Little interest or pleasure in doing things? 0-->not at all   Feeling down, depressed, or hopeless? 0-->not at all   PHQ-2 Total Score 0           Diagnoses and all orders for this visit:    1.  Visit for annual health examination (Primary)    2. Social anxiety disorder  Comments:  Continue Celexa  Orders:  -     citalopram (CeleXA) 40 MG tablet; Take 1 tablet by mouth Daily.  Dispense: 30 tablet; Refill: 5  -     TSH  -     T4, Free    3. Encounter for immunization  -     Pneumococcal Conjugate Vaccine 20-Valent (PCV20)    4. Dyslipidemia  Assessment & Plan:  Continue Crestor 40 mg and Praluent 75 mg as directed.  Continue to work on diet changes.    Orders:  -     Lipid Panel    5. Essential hypertension  Assessment & Plan:  Continue Metoprolol 25 mg   Continue Entresto.  Continue under the care of cardiology    Orders:  -     CBC & Differential  -     Comprehensive Metabolic Panel    6. Vitamin D deficiency  -     Vitamin D,25-Hydroxy    7. Chronic systolic heart failure (HCC)  Overview:  EF 35 to 40% with severe hypokinesia of the LAD  Doppler flow suggestive of grade 1 diastolic dysfunction    Assessment & Plan:  Continue Entresto 24-26  Report any symptoms of shortness of breath or any evidence of fluid retention.    Orders:  -     Hemoglobin A1c  -     Vitamin B12      BMI is >= 30 and <35. (Class 1 Obesity). The following options were offered after discussion;: nutrition counseling/recommendations     George Ribeiro  reports that he has never smoked. He has never used smokeless tobacco.. I have educated him on the risk of diseases from using tobacco products such as cancer, COPD and heart disease.     Understands disease processes and need for medications.  Understands reasons for urgent and emergent care.  Patient (& family) verbalized agreement for treatment plan.   Emotional support and active listening provided.  Patient provided time to verbalize feelings.  Counseling provided today including importance of good nutrition, exercise as tolerated, dental health, stress reduction and mental health. Importance of immunizations discussed.   Appropriate screenings based on gender (paps, breast  exam, mammogram, PSA, colon screens, etc).     Counseled on safe sex practices and STD prevention.   Counseling regarding gun and water safety, domestic violence, and seatbelt use.      RTC 3-4 months, sooner if needed.           This document has been electronically signed by:  NANCY Jessica FNP-C Dragon disclaimer:  Part of this note may be an electronic transcription/translation of spoken language to printed text using the Dragon Dictation System.

## 2022-11-09 LAB — HBA1C MFR BLD: 5.6 % (ref 4.8–5.6)

## 2022-11-15 NOTE — PROGRESS NOTES
Blood sugars are good.  Vitamin D and B12 are good.  Thyroid is normal.  Cholesterol is good.  One of his kidney markers is slightly decreased but the filtration is good.  Be sure he is drinking plenty of fluids.  Blood count is normal.

## 2022-11-30 ENCOUNTER — OFFICE VISIT (OUTPATIENT)
Dept: FAMILY MEDICINE CLINIC | Facility: CLINIC | Age: 42
End: 2022-11-30

## 2022-11-30 VITALS
BODY MASS INDEX: 31.75 KG/M2 | WEIGHT: 226.8 LBS | RESPIRATION RATE: 16 BRPM | SYSTOLIC BLOOD PRESSURE: 120 MMHG | HEART RATE: 80 BPM | OXYGEN SATURATION: 99 % | TEMPERATURE: 99.4 F | HEIGHT: 71 IN | DIASTOLIC BLOOD PRESSURE: 78 MMHG

## 2022-11-30 DIAGNOSIS — R10.32 LEFT GROIN PAIN: ICD-10-CM

## 2022-11-30 DIAGNOSIS — R68.89 FLU-LIKE SYMPTOMS: Primary | ICD-10-CM

## 2022-11-30 DIAGNOSIS — N50.812 PAIN IN LEFT TESTICLE: ICD-10-CM

## 2022-11-30 DIAGNOSIS — M25.552 LEFT HIP PAIN: ICD-10-CM

## 2022-11-30 LAB
B PARAPERT DNA SPEC QL NAA+PROBE: NOT DETECTED
B PERT DNA SPEC QL NAA+PROBE: NOT DETECTED
C PNEUM DNA NPH QL NAA+NON-PROBE: NOT DETECTED
FLUAV H1 2009 PAND RNA NPH QL NAA+PROBE: DETECTED
FLUBV RNA ISLT QL NAA+PROBE: NOT DETECTED
HADV DNA SPEC NAA+PROBE: NOT DETECTED
HCOV 229E RNA SPEC QL NAA+PROBE: NOT DETECTED
HCOV HKU1 RNA SPEC QL NAA+PROBE: NOT DETECTED
HCOV NL63 RNA SPEC QL NAA+PROBE: NOT DETECTED
HCOV OC43 RNA SPEC QL NAA+PROBE: NOT DETECTED
HMPV RNA NPH QL NAA+NON-PROBE: NOT DETECTED
HPIV1 RNA ISLT QL NAA+PROBE: NOT DETECTED
HPIV2 RNA SPEC QL NAA+PROBE: NOT DETECTED
HPIV3 RNA NPH QL NAA+PROBE: NOT DETECTED
HPIV4 P GENE NPH QL NAA+PROBE: NOT DETECTED
M PNEUMO IGG SER IA-ACNC: NOT DETECTED
RHINOVIRUS RNA SPEC NAA+PROBE: NOT DETECTED
RSV RNA NPH QL NAA+NON-PROBE: NOT DETECTED
SARS-COV-2 RNA NPH QL NAA+NON-PROBE: NOT DETECTED

## 2022-11-30 PROCEDURE — 0202U NFCT DS 22 TRGT SARS-COV-2: CPT | Performed by: NURSE PRACTITIONER

## 2022-11-30 PROCEDURE — 99213 OFFICE O/P EST LOW 20 MIN: CPT | Performed by: NURSE PRACTITIONER

## 2022-11-30 RX ORDER — PROMETHAZINE HYDROCHLORIDE 6.25 MG/5ML
6.25-12.5 SYRUP ORAL EVERY 6 HOURS PRN
Qty: 60 ML | Refills: 0 | Status: SHIPPED | OUTPATIENT
Start: 2022-11-30 | End: 2023-03-07

## 2022-11-30 RX ORDER — OSELTAMIVIR PHOSPHATE 75 MG/1
75 CAPSULE ORAL 2 TIMES DAILY
Qty: 10 CAPSULE | Refills: 0 | Status: SHIPPED | OUTPATIENT
Start: 2022-11-30 | End: 2023-03-07

## 2022-11-30 RX ORDER — GUAIFENESIN AND DEXTROMETHORPHAN HYDROBROMIDE 100; 10 MG/5ML; MG/5ML
5-10 SOLUTION ORAL EVERY 6 HOURS PRN
Qty: 60 ML | Refills: 0 | Status: SHIPPED | OUTPATIENT
Start: 2022-11-30 | End: 2023-03-07

## 2022-12-13 ENCOUNTER — HOSPITAL ENCOUNTER (OUTPATIENT)
Dept: CT IMAGING | Facility: HOSPITAL | Age: 42
Discharge: HOME | End: 2022-12-13
Admitting: NURSE PRACTITIONER

## 2022-12-13 PROCEDURE — 72192 CT PELVIS W/O DYE: CPT | Performed by: RADIOLOGY

## 2022-12-13 PROCEDURE — 72192 CT PELVIS W/O DYE: CPT

## 2022-12-14 NOTE — PROGRESS NOTES
His CT scan does not show any abnormalities other than artery/plaque.  He is already on the medication we need for that.  Did he do the hip xray yesterday too?

## 2022-12-19 ENCOUNTER — HOSPITAL ENCOUNTER (OUTPATIENT)
Dept: GENERAL RADIOLOGY | Facility: HOSPITAL | Age: 42
Discharge: HOME | End: 2022-12-19
Admitting: NURSE PRACTITIONER

## 2022-12-19 PROCEDURE — 73502 X-RAY EXAM HIP UNI 2-3 VIEWS: CPT | Performed by: RADIOLOGY

## 2022-12-19 PROCEDURE — 73502 X-RAY EXAM HIP UNI 2-3 VIEWS: CPT

## 2022-12-20 NOTE — PROGRESS NOTES
Patient notified of labs via [employer].  Patient message is as follows:      Hip xray does not show any problems.

## 2023-01-05 ENCOUNTER — APPOINTMENT (OUTPATIENT)
Dept: CT IMAGING | Facility: HOSPITAL | Age: 43
End: 2023-01-05

## 2023-03-07 ENCOUNTER — OFFICE VISIT (OUTPATIENT)
Dept: FAMILY MEDICINE CLINIC | Facility: CLINIC | Age: 43
End: 2023-03-07
Payer: COMMERCIAL

## 2023-03-07 VITALS
OXYGEN SATURATION: 100 % | SYSTOLIC BLOOD PRESSURE: 116 MMHG | RESPIRATION RATE: 14 BRPM | TEMPERATURE: 98.4 F | HEART RATE: 62 BPM | BODY MASS INDEX: 31.36 KG/M2 | HEIGHT: 71 IN | DIASTOLIC BLOOD PRESSURE: 70 MMHG | WEIGHT: 224 LBS

## 2023-03-07 DIAGNOSIS — E53.8 VITAMIN B12 DEFICIENCY: ICD-10-CM

## 2023-03-07 DIAGNOSIS — E55.9 VITAMIN D DEFICIENCY: ICD-10-CM

## 2023-03-07 DIAGNOSIS — R73.09 ABNORMAL GLUCOSE: ICD-10-CM

## 2023-03-07 DIAGNOSIS — F40.10 SOCIAL ANXIETY DISORDER: ICD-10-CM

## 2023-03-07 DIAGNOSIS — I50.22 CHRONIC SYSTOLIC HEART FAILURE: Primary | ICD-10-CM

## 2023-03-07 DIAGNOSIS — I10 ESSENTIAL HYPERTENSION: ICD-10-CM

## 2023-03-07 DIAGNOSIS — E78.5 DYSLIPIDEMIA: ICD-10-CM

## 2023-03-07 LAB
25(OH)D3 SERPL-MCNC: 27.9 NG/ML (ref 30–100)
ALBUMIN SERPL-MCNC: 4.7 G/DL (ref 3.5–5.2)
ALBUMIN/GLOB SERPL: 1.7 G/DL
ALP SERPL-CCNC: 59 U/L (ref 39–117)
ALT SERPL W P-5'-P-CCNC: 22 U/L (ref 1–41)
ANION GAP SERPL CALCULATED.3IONS-SCNC: 9.9 MMOL/L (ref 5–15)
AST SERPL-CCNC: 27 U/L (ref 1–40)
BASOPHILS # BLD AUTO: 0.04 10*3/MM3 (ref 0–0.2)
BASOPHILS NFR BLD AUTO: 0.6 % (ref 0–1.5)
BILIRUB SERPL-MCNC: 0.9 MG/DL (ref 0–1.2)
BUN SERPL-MCNC: 14 MG/DL (ref 6–20)
BUN/CREAT SERPL: 18.7 (ref 7–25)
CALCIUM SPEC-SCNC: 10 MG/DL (ref 8.6–10.5)
CHLORIDE SERPL-SCNC: 103 MMOL/L (ref 98–107)
CHOLEST SERPL-MCNC: 119 MG/DL (ref 0–200)
CO2 SERPL-SCNC: 28.1 MMOL/L (ref 22–29)
CREAT SERPL-MCNC: 0.75 MG/DL (ref 0.76–1.27)
DEPRECATED RDW RBC AUTO: 39.2 FL (ref 37–54)
EGFRCR SERPLBLD CKD-EPI 2021: 115.5 ML/MIN/1.73
EOSINOPHIL # BLD AUTO: 0.07 10*3/MM3 (ref 0–0.4)
EOSINOPHIL NFR BLD AUTO: 1.1 % (ref 0.3–6.2)
ERYTHROCYTE [DISTWIDTH] IN BLOOD BY AUTOMATED COUNT: 12.3 % (ref 12.3–15.4)
GLOBULIN UR ELPH-MCNC: 2.7 GM/DL
GLUCOSE SERPL-MCNC: 87 MG/DL (ref 65–99)
HBA1C MFR BLD: 5.4 % (ref 4.8–5.6)
HCT VFR BLD AUTO: 47.3 % (ref 37.5–51)
HDLC SERPL-MCNC: 43 MG/DL (ref 40–60)
HGB BLD-MCNC: 16.1 G/DL (ref 13–17.7)
IMM GRANULOCYTES # BLD AUTO: 0.01 10*3/MM3 (ref 0–0.05)
IMM GRANULOCYTES NFR BLD AUTO: 0.2 % (ref 0–0.5)
LDLC SERPL CALC-MCNC: 62 MG/DL (ref 0–100)
LDLC/HDLC SERPL: 1.45 {RATIO}
LYMPHOCYTES # BLD AUTO: 2.11 10*3/MM3 (ref 0.7–3.1)
LYMPHOCYTES NFR BLD AUTO: 32 % (ref 19.6–45.3)
MCH RBC QN AUTO: 29.9 PG (ref 26.6–33)
MCHC RBC AUTO-ENTMCNC: 34 G/DL (ref 31.5–35.7)
MCV RBC AUTO: 87.9 FL (ref 79–97)
MONOCYTES # BLD AUTO: 0.69 10*3/MM3 (ref 0.1–0.9)
MONOCYTES NFR BLD AUTO: 10.5 % (ref 5–12)
NEUTROPHILS NFR BLD AUTO: 3.68 10*3/MM3 (ref 1.7–7)
NEUTROPHILS NFR BLD AUTO: 55.6 % (ref 42.7–76)
NRBC BLD AUTO-RTO: 0 /100 WBC (ref 0–0.2)
PLATELET # BLD AUTO: 253 10*3/MM3 (ref 140–450)
PMV BLD AUTO: 10.2 FL (ref 6–12)
POTASSIUM SERPL-SCNC: 4.1 MMOL/L (ref 3.5–5.2)
PROT SERPL-MCNC: 7.4 G/DL (ref 6–8.5)
RBC # BLD AUTO: 5.38 10*6/MM3 (ref 4.14–5.8)
SODIUM SERPL-SCNC: 141 MMOL/L (ref 136–145)
T4 FREE SERPL-MCNC: 1.71 NG/DL (ref 0.93–1.7)
TRIGL SERPL-MCNC: 69 MG/DL (ref 0–150)
TSH SERPL DL<=0.05 MIU/L-ACNC: 0.06 UIU/ML (ref 0.27–4.2)
VIT B12 BLD-MCNC: 438 PG/ML (ref 211–946)
VLDLC SERPL-MCNC: 14 MG/DL (ref 5–40)
WBC NRBC COR # BLD: 6.6 10*3/MM3 (ref 3.4–10.8)

## 2023-03-07 PROCEDURE — 80061 LIPID PANEL: CPT | Performed by: NURSE PRACTITIONER

## 2023-03-07 PROCEDURE — 82607 VITAMIN B-12: CPT | Performed by: NURSE PRACTITIONER

## 2023-03-07 PROCEDURE — 84439 ASSAY OF FREE THYROXINE: CPT | Performed by: NURSE PRACTITIONER

## 2023-03-07 PROCEDURE — 99214 OFFICE O/P EST MOD 30 MIN: CPT | Performed by: NURSE PRACTITIONER

## 2023-03-07 PROCEDURE — 83036 HEMOGLOBIN GLYCOSYLATED A1C: CPT | Performed by: NURSE PRACTITIONER

## 2023-03-07 PROCEDURE — 80050 GENERAL HEALTH PANEL: CPT | Performed by: NURSE PRACTITIONER

## 2023-03-07 PROCEDURE — 82306 VITAMIN D 25 HYDROXY: CPT | Performed by: NURSE PRACTITIONER

## 2023-03-07 NOTE — PROGRESS NOTES
Subjective   George Ribeiro is a 42 y.o. male.     Chief Complaint   Patient presents with   • Hypertension       History of Present Illness     Hypertension/history of MI/CHF-patient is currently on Entresto 24-26.  No negative side effects.  He is also on metoprolol 25 mg and Xarelto 20 mg and Plavix 75 mg.  No negative side effects.  Patient denies any bleeding of gums, melena, hematochezia, hematuria, or epistaxis.  No associated symptoms such as CP, SOA, HA, or dizziness.  Has an appt for April 6 for a follow up.  He will see the primary doctor who did his procedures.  He denies any difficulty with exercise.   Hyperlipidemia-ongoing.  Patient is currently taking Crestor 40 mg and Praluent 75 mg every 2 weeks..  No negative side effects.  Patient denies any negative side effects of cholesterol medication.  No reported myalgia or myopathies.  Depression/anxiety-ongoing.  Patient is currently taking Celexa 40 mg.  No negative side effects.  Hip Complaint-reports not gone but not as bad.  He reports sitting on the exam table he can feel the discomfort.  Riding long distance is also uncomfortable.  Xray and MRI were negative.  He reports he is able to walk long distance and has been able to jog without noting the pain.   He has some stiffness across his low back.     The following portions of the patient's history were reviewed and updated as appropriate: CC, ROS, allergies, current medications, past family history, past medical history, past social history, past surgical history and problem list.    Review of Systems   Constitutional: Positive for fatigue. Negative for activity change, appetite change and fever.   HENT: Negative for congestion, ear pain, facial swelling, nosebleeds, rhinorrhea, sinus pressure, sore throat and trouble swallowing.    Eyes: Negative for blurred vision, double vision and redness.   Respiratory: Negative for cough, chest tightness, shortness of breath and wheezing.   "  Cardiovascular: Positive for palpitations (occasional flutter). Negative for chest pain (very occasional) and leg swelling.   Gastrointestinal: Negative for abdominal pain, blood in stool, constipation, diarrhea, nausea and vomiting.   Endocrine: Negative.    Genitourinary: Negative for dysuria, flank pain, frequency, hematuria and urgency.   Musculoskeletal: Positive for arthralgias. Negative for back pain, gait problem and myalgias.   Allergic/Immunologic: Negative.    Neurological: Negative for dizziness, weakness, light-headedness and headache.   Hematological: Negative.    Psychiatric/Behavioral: Negative for self-injury, sleep disturbance, suicidal ideas and depressed mood. The patient is not nervous/anxious.    All other systems reviewed and are negative.      Objective     /70   Pulse 62   Temp 98.4 °F (36.9 °C)   Resp 14   Ht 180.3 cm (70.98\")   Wt 102 kg (224 lb)   SpO2 100%   BMI 31.26 kg/m²     Physical Exam  Vitals reviewed.   Constitutional:       General: He is not in acute distress.     Appearance: He is well-developed. He is not diaphoretic.   HENT:      Head: Normocephalic and atraumatic.      Jaw: No tenderness.      Comments: Oropharynx not examined.  Patient is presently wearing a face covering/mask due to COVID-19 pandemic.     Right Ear: Hearing, tympanic membrane, ear canal and external ear normal.      Left Ear: Hearing, tympanic membrane, ear canal and external ear normal.   Eyes:      General: Lids are normal. No scleral icterus.     Extraocular Movements:      Right eye: Normal extraocular motion and no nystagmus.      Left eye: Normal extraocular motion and no nystagmus.      Conjunctiva/sclera: Conjunctivae normal.      Pupils: Pupils are equal, round, and reactive to light.   Neck:      Thyroid: No thyromegaly or thyroid tenderness.      Vascular: No carotid bruit or JVD.      Trachea: No tracheal tenderness.   Cardiovascular:      Rate and Rhythm: Normal rate and " regular rhythm.      Pulses:           Dorsalis pedis pulses are 2+ on the right side and 2+ on the left side.        Posterior tibial pulses are 2+ on the right side and 2+ on the left side.      Heart sounds: Normal heart sounds, S1 normal and S2 normal. No murmur heard.  Pulmonary:      Effort: Pulmonary effort is normal. No accessory muscle usage, prolonged expiration or respiratory distress.      Breath sounds: Normal breath sounds.   Chest:      Chest wall: No tenderness.   Abdominal:      General: Bowel sounds are normal. There is no distension.      Palpations: Abdomen is soft. There is no hepatomegaly, splenomegaly or mass.      Tenderness: There is no abdominal tenderness.   Musculoskeletal:         General: Tenderness present.      Cervical back: Normal range of motion and neck supple.      Right lower leg: No edema.      Left lower leg: No edema.      Comments: No muscular atrophy or flaccidity.   Lymphadenopathy:      Head:      Right side of head: No submental or submandibular adenopathy.      Left side of head: No submental or submandibular adenopathy.      Cervical: No cervical adenopathy.      Right cervical: No superficial cervical adenopathy.     Left cervical: No superficial cervical adenopathy.   Skin:     General: Skin is warm and dry.      Capillary Refill: Capillary refill takes less than 2 seconds.      Coloration: Skin is not jaundiced or pale.      Findings: No erythema.      Nails: There is no clubbing.   Neurological:      Mental Status: He is alert and oriented to person, place, and time.      Cranial Nerves: No cranial nerve deficit or facial asymmetry.      Sensory: No sensory deficit.      Motor: No weakness, tremor, atrophy or abnormal muscle tone.      Coordination: Coordination normal.      Gait: Gait normal.      Deep Tendon Reflexes: Reflexes are normal and symmetric.   Psychiatric:         Attention and Perception: He is attentive.         Mood and Affect: Mood normal. Mood is  not anxious or depressed.         Speech: Speech normal.         Behavior: Behavior normal. Behavior is cooperative.         Thought Content: Thought content normal.         Cognition and Memory: Cognition normal.         Judgment: Judgment normal.         Diagnoses and all orders for this visit:    1. Chronic systolic heart failure (HCC) (Primary)  Comments:  continue under care of cardiology.  continue meds as directed.  Overview:  EF 35 to 40% with severe hypokinesia of the LAD  Doppler flow suggestive of grade 1 diastolic dysfunction      2. Essential hypertension  Comments:  Continue Metoprolol.  monitor BP and report elevations   Assessment & Plan:  Continue Metoprolol 25 mg   Continue Entresto.  Continue under the care of cardiology    Orders:  -     CBC & Differential  -     Comprehensive Metabolic Panel    3. Social anxiety disorder  Assessment & Plan:  Continue Celexa.    Stress reduction advised (examples:  make time for self, Reading, Listening to music, Exercise, keeping a journal, venting to a confidant, etc.)      Orders:  -     TSH  -     T4, Free    4. Dyslipidemia  Comments:  continue Crestor 40 mg.  Continue Praluent 75 mg injections   Orders:  -     Lipid Panel    5. Vitamin D deficiency  -     Vitamin D,25-Hydroxy    6. Vitamin B12 deficiency  -     Vitamin B12    7. Abnormal glucose  -     Hemoglobin A1c           Understands disease processes and need for medications.  Understands reasons for urgent and emergent care.  Patient (& family) verbalized agreement for treatment plan.   Emotional support and active listening provided.  Patient provided time to verbalize feelings.    Labs today.  Patient will obtain a copy for his cardiology appt.     RTC 3-4 months, sooner if needed.             This document has been electronically signed by:  NANCY Jessica FNP-C Dragon disclaimer:  Part of this note may be an electronic transcription/translation of spoken language to printed text using  the Dragon Dictation System.

## 2023-03-10 ENCOUNTER — TELEPHONE (OUTPATIENT)
Dept: FAMILY MEDICINE CLINIC | Facility: CLINIC | Age: 43
End: 2023-03-10
Payer: COMMERCIAL

## 2023-03-10 NOTE — TELEPHONE ENCOUNTER
Caller: George Ribeiro    Relationship: Self    Best call back number: [phone]    What is the best time to reach you: ANY    Who are you requesting to speak with (clinical staff, provider,  specific staff member): CLINICAL    What was the call regarding: WANTS TO KNOW IF HE CAN SCHEDULE AN EKG TODAY- SYMPTOMS INCLUDE: FLUTTERING AND CHEST PAIN    Do you require a callback: YES, PLEASE- WARM TRANSFERRED RED FLAG  DOES NOT WANT TO GO TO ER

## 2023-03-10 NOTE — TELEPHONE ENCOUNTER
Caller: Lakesha Ribeiro     Relationship: Self     Best call back number: [phone]     What is the best time to reach you: ANY     Who are you requesting to speak with (clinical staff, provider,  specific staff member): CLINICAL     What was the call regarding: WANTS TO KNOW IF HE CAN SCHEDULE AN EKG TODAY- SYMPTOMS INCLUDE: FLUTTERING AND CHEST PAIN     Do you require a callback: YES, PLEASE- WARM TRANSFERRED RED FLAG  DOES NOT WANT TO GO TO ER             LH    11:01 AM  Lauren Rodgers [employer] Rep routed this conversation to [facility]    Claudette Virk MA  to [facility]    11:12 AM  Note   Will you all see if you can schedule him for an apt.                TM    11:24 AM  You routed this conversation to Liyah Borges RegSched Rep Davis, Janet Faye, RegSched Rep  to Me    JEAN CLAUDE    11:38 AM  Spoke to lakesha on the phone transfer from the [facility], telling him he needed to go on to the er as bad as he felt and with chest pains and heart palapations, that we would have to call an ambulance if he got down here and ekg was bad or something went wrong, we advise anyone that calls with chest pain to go the ER immediately when advised of the urgent message, [facility] suggested also, so did . So sorry   Me     TM    11:51 AM  Note   [facility] to read     See rosendo espinosa

## 2023-03-19 NOTE — PROGRESS NOTES
Vitamin D has gone down.  His thyroid is also below normal.  I would like to repeat that level again in about 2 weeks for verification.  If it is abnormal again we will plan for thyroid ultrasound.      B12 level is good.  Cholesterol is good.  Blood sugars are normal.  Blood count is good.    Restart some vitamin D3 5000 units over-the-counter if he is not taking any vitamin D at home.

## 2023-04-13 ENCOUNTER — CLINICAL SUPPORT (OUTPATIENT)
Dept: FAMILY MEDICINE CLINIC | Facility: CLINIC | Age: 43
End: 2023-04-13
Payer: OTHER MISCELLANEOUS

## 2023-04-13 ENCOUNTER — LAB (OUTPATIENT)
Dept: FAMILY MEDICINE CLINIC | Facility: CLINIC | Age: 43
End: 2023-04-13
Payer: COMMERCIAL
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
## 2023-04-13 DIAGNOSIS — R79.89 ABNORMAL THYROID BLOOD TEST: Primary | ICD-10-CM
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
## 2023-04-13 PROCEDURE — 84443 ASSAY THYROID STIM HORMONE: CPT | Performed by: NURSE PRACTITIONER

## 2023-04-13 PROCEDURE — 84439 ASSAY OF FREE THYROXINE: CPT | Performed by: NURSE PRACTITIONER

## 2023-04-13 RX ORDER — CEPHALEXIN 500 MG/1
500 CAPSULE ORAL 3 TIMES DAILY
Qty: 30 CAPSULE | Refills: 0 | Status: SHIPPED | OUTPATIENT
Start: 2023-04-13

## 2023-04-14 LAB
T4 FREE SERPL-MCNC: 2.21 NG/DL (ref 0.93–1.7)
TSH SERPL DL<=0.05 MIU/L-ACNC: <0.005 UIU/ML (ref 0.27–4.2)

## 2023-04-16 DIAGNOSIS — R94.6 ABNORMAL THYROID FUNCTION TEST: Primary | ICD-10-CM

## 2023-04-16 NOTE — PROGRESS NOTES
Patient notified of labs via [employer].  Patient message is as follows:      Your thyroid labs continue to be abnormal.  The TSH is even slightly lower than they were previously.  This puts you into hyperthyroidism.  I am going to plan to do an ultrasound of your thyroid and refer you to the thyroid specialist.

## 2023-05-19 ENCOUNTER — LAB (OUTPATIENT)
Dept: FAMILY MEDICINE CLINIC | Facility: CLINIC | Age: 43
End: 2023-05-19
Payer: COMMERCIAL

## 2023-05-19 DIAGNOSIS — I50.20 SYSTOLIC HEART FAILURE, UNSPECIFIED HF CHRONICITY: Primary | ICD-10-CM

## 2023-05-19 DIAGNOSIS — F40.10 SOCIAL ANXIETY DISORDER: ICD-10-CM

## 2023-05-19 LAB
ANION GAP SERPL CALCULATED.3IONS-SCNC: 10.7 MMOL/L (ref 5–15)
BUN SERPL-MCNC: 15 MG/DL (ref 6–20)
BUN/CREAT SERPL: 22.1 (ref 7–25)
CALCIUM SPEC-SCNC: 9.4 MG/DL (ref 8.6–10.5)
CHLORIDE SERPL-SCNC: 105 MMOL/L (ref 98–107)
CO2 SERPL-SCNC: 24.3 MMOL/L (ref 22–29)
CREAT SERPL-MCNC: 0.68 MG/DL (ref 0.76–1.27)
EGFRCR SERPLBLD CKD-EPI 2021: 118.3 ML/MIN/1.73
GLUCOSE SERPL-MCNC: 99 MG/DL (ref 65–99)
POTASSIUM SERPL-SCNC: 4 MMOL/L (ref 3.5–5.2)
SODIUM SERPL-SCNC: 140 MMOL/L (ref 136–145)

## 2023-05-19 PROCEDURE — 80048 BASIC METABOLIC PNL TOTAL CA: CPT | Performed by: NURSE PRACTITIONER

## 2023-05-20 RX ORDER — CITALOPRAM 40 MG/1
40 TABLET ORAL DAILY
Qty: 30 TABLET | Refills: 5 | Status: SHIPPED | OUTPATIENT
Start: 2023-05-20

## 2023-05-22 DIAGNOSIS — R94.4 DECREASED CREATININE CLEARANCE: Primary | ICD-10-CM

## 2023-05-22 NOTE — PROGRESS NOTES
Kidney function is stable.  I am going to order him a kidney ultrasound to be sure that his anatomy is good.  I will also be doing a medication review to see if any of his medications may cause him some kidney decrease.

## 2023-05-30 RX ORDER — SCOLOPAMINE TRANSDERMAL SYSTEM 1 MG/1
1 PATCH, EXTENDED RELEASE TRANSDERMAL
Qty: 4 EACH | Refills: 0 | Status: SHIPPED | OUTPATIENT
Start: 2023-05-30

## 2023-06-01 ENCOUNTER — HOSPITAL ENCOUNTER (OUTPATIENT)
Dept: ULTRASOUND IMAGING | Facility: HOSPITAL | Age: 43
Discharge: HOME | End: 2023-06-01

## 2023-06-01 DIAGNOSIS — R94.4 DECREASED CREATININE CLEARANCE: ICD-10-CM

## 2023-06-01 PROCEDURE — 76775 US EXAM ABDO BACK WALL LIM: CPT

## 2023-06-01 PROCEDURE — 76775 US EXAM ABDO BACK WALL LIM: CPT | Performed by: RADIOLOGY

## 2023-06-01 NOTE — PROGRESS NOTES
Patient notified of labs via [employer].  Patient message is as follows:      Kidney ultra sound is good.  Kidneys are normal.

## 2023-08-07 ENCOUNTER — OFFICE VISIT (OUTPATIENT)
Dept: FAMILY MEDICINE CLINIC | Facility: CLINIC | Age: 43
End: 2023-08-07
Payer: COMMERCIAL

## 2023-08-07 VITALS
TEMPERATURE: 98.2 F | BODY MASS INDEX: 31.92 KG/M2 | SYSTOLIC BLOOD PRESSURE: 112 MMHG | OXYGEN SATURATION: 98 % | RESPIRATION RATE: 12 BRPM | HEART RATE: 58 BPM | DIASTOLIC BLOOD PRESSURE: 74 MMHG | WEIGHT: 228 LBS | HEIGHT: 71 IN

## 2023-08-07 DIAGNOSIS — M79.89 NODULE OF SOFT TISSUE: ICD-10-CM

## 2023-08-07 DIAGNOSIS — I50.22 CHRONIC SYSTOLIC HEART FAILURE: ICD-10-CM

## 2023-08-07 DIAGNOSIS — R53.83 OTHER FATIGUE: ICD-10-CM

## 2023-08-07 DIAGNOSIS — K21.9 GASTROESOPHAGEAL REFLUX DISEASE WITHOUT ESOPHAGITIS: ICD-10-CM

## 2023-08-07 DIAGNOSIS — R94.6 ABNORMAL THYROID FUNCTION TEST: Primary | ICD-10-CM

## 2023-08-07 DIAGNOSIS — R00.2 PALPITATIONS: ICD-10-CM

## 2023-08-07 PROCEDURE — 99214 OFFICE O/P EST MOD 30 MIN: CPT | Performed by: NURSE PRACTITIONER

## 2023-08-07 PROCEDURE — 93000 ELECTROCARDIOGRAM COMPLETE: CPT | Performed by: NURSE PRACTITIONER

## 2023-08-07 PROCEDURE — 85025 COMPLETE CBC W/AUTO DIFF WBC: CPT | Performed by: NURSE PRACTITIONER

## 2023-08-07 PROCEDURE — 80053 COMPREHEN METABOLIC PANEL: CPT | Performed by: NURSE PRACTITIONER

## 2023-08-07 PROCEDURE — 83735 ASSAY OF MAGNESIUM: CPT | Performed by: NURSE PRACTITIONER

## 2023-08-07 RX ORDER — FAMOTIDINE 20 MG/1
20 TABLET, FILM COATED ORAL 2 TIMES DAILY
Qty: 20 TABLET | Refills: 0 | Status: SHIPPED | OUTPATIENT
Start: 2023-08-07

## 2023-08-07 NOTE — PROGRESS NOTES
Subjective   George Ribeiro is a 43 y.o. male.     Chief Complaint   Patient presents with    Fatigue       History of Present Illness     Fatigue-began over the weekend.  He has had some increased activity.  He reports that has been active more than his usual this past week.  He reports that he is not dizzy but feels unfocused somewhat.  No obvious confusion.  He reports no sensation of syncope.  He is right dominant but did note some deep chest discomfort yesterday that was aching in quality.  It did not feel muscular.   Pain did not ever feel sharp and was intermittent all day.  He did not feel he was having any increased sweating.    He has had a video call with cardiology in July (29th).  He denies changes in urine output. No pedal edema.  He reports that he did not do anything specific for yesterdays pain or discomfort.  He reports that since Saturday he has had some stomach discomfort just above his umbilicus.  No bowel changes.  He reports that he has not noted heartburn.  He reports no cough.  He reports that he is not certain if the discomfort is related.  He has not missed any meds.    Imaging-did not get imaging for thyroid.  His last level was WNL.  Soft tissue nodule on back of neck-continues to be present but is not as sore.  No new concerns.        The following portions of the patient's history were reviewed and updated as appropriate: CC, ROS, allergies, current medications, past family history, past medical history, past social history, past surgical history and problem list.    Review of Systems   Constitutional:  Positive for fatigue. Negative for appetite change, chills, unexpected weight gain and unexpected weight loss.   HENT:  Negative for congestion, ear pain, postnasal drip, rhinorrhea, sore throat, swollen glands, trouble swallowing and voice change.    Eyes:  Negative for pain and visual disturbance.   Respiratory:  Negative for cough, chest tightness, shortness of breath and  "wheezing.    Cardiovascular:  Negative for chest pain, palpitations and leg swelling.   Gastrointestinal:  Negative for abdominal pain, blood in stool, constipation, diarrhea, nausea and indigestion.   Genitourinary:  Negative for dysuria, hematuria and urgency.   Musculoskeletal:  Negative for arthralgias, back pain, gait problem, joint swelling and myalgias.   Skin:  Negative for color change and skin lesions.   Allergic/Immunologic: Negative.    Neurological:  Negative for dizziness, numbness and headache.   Hematological: Negative.    Psychiatric/Behavioral:  Negative for dysphoric mood, sleep disturbance and suicidal ideas. The patient is not nervous/anxious.    All other systems reviewed and are negative.    Objective     /74   Pulse 58   Temp 98.2 øF (36.8 øC)   Resp 12   Ht 180.3 cm (70.98\")   Wt 103 kg (228 lb)   SpO2 98%   BMI 31.81 kg/mý     Physical Exam  Vitals reviewed.   Constitutional:       General: He is not in acute distress.     Appearance: He is well-developed. He is obese. He is not diaphoretic.   HENT:      Head: Normocephalic and atraumatic.      Jaw: No tenderness.      Right Ear: Hearing, tympanic membrane, ear canal and external ear normal.      Left Ear: Hearing, tympanic membrane, ear canal and external ear normal.      Nose: Nose normal. No nasal tenderness or congestion.      Right Sinus: No maxillary sinus tenderness or frontal sinus tenderness.      Left Sinus: No maxillary sinus tenderness or frontal sinus tenderness.      Mouth/Throat:      Lips: Pink.      Mouth: Mucous membranes are moist.      Pharynx: Oropharynx is clear. Uvula midline.   Eyes:      General: Lids are normal. No scleral icterus.     Extraocular Movements:      Right eye: Normal extraocular motion and no nystagmus.      Left eye: Normal extraocular motion and no nystagmus.      Conjunctiva/sclera: Conjunctivae normal.      Pupils: Pupils are equal, round, and reactive to light.   Neck:      Thyroid: " No thyromegaly or thyroid tenderness.      Vascular: No carotid bruit or JVD.      Trachea: No tracheal tenderness.   Cardiovascular:      Rate and Rhythm: Normal rate and regular rhythm.      Pulses:           Dorsalis pedis pulses are 2+ on the right side and 2+ on the left side.        Posterior tibial pulses are 2+ on the right side and 2+ on the left side.      Heart sounds: Normal heart sounds, S1 normal and S2 normal. No murmur heard.  Pulmonary:      Effort: Pulmonary effort is normal. No accessory muscle usage, prolonged expiration or respiratory distress.      Breath sounds: Normal breath sounds.   Chest:      Chest wall: No tenderness.   Abdominal:      General: Bowel sounds are normal. There is no distension.      Palpations: Abdomen is soft. There is no hepatomegaly, splenomegaly or mass.      Tenderness: There is no abdominal tenderness.   Musculoskeletal:         General: No tenderness.      Cervical back: Normal range of motion and neck supple.      Right lower leg: No edema.      Left lower leg: No edema.      Comments: No muscular atrophy or flaccidity.   Lymphadenopathy:      Head:      Right side of head: No submental or submandibular adenopathy.      Left side of head: No submental or submandibular adenopathy.      Cervical: No cervical adenopathy.      Right cervical: No superficial cervical adenopathy.     Left cervical: No superficial cervical adenopathy.   Skin:     General: Skin is warm and dry.      Capillary Refill: Capillary refill takes less than 2 seconds.      Coloration: Skin is not jaundiced or pale.      Findings: No erythema.      Nails: There is no clubbing.   Neurological:      Mental Status: He is alert and oriented to person, place, and time.      Cranial Nerves: No cranial nerve deficit or facial asymmetry.      Sensory: No sensory deficit.      Motor: No weakness, tremor, atrophy or abnormal muscle tone.      Coordination: Coordination normal.      Gait: Gait normal.       Deep Tendon Reflexes: Reflexes are normal and symmetric.   Psychiatric:         Attention and Perception: He is attentive.         Mood and Affect: Mood normal. Mood is not anxious or depressed.         Speech: Speech normal.         Behavior: Behavior normal. Behavior is cooperative.         Thought Content: Thought content normal.         Cognition and Memory: Cognition normal.         Judgment: Judgment normal.         Diagnoses and all orders for this visit:    1. Abnormal thyroid function test (Primary)  Comments:  will reorder imaging. we will continue to monitor labs.  Orders:  -     Cancel: US Thyroid  -     US Thyroid    2. Nodule of soft tissue  -     US Head Neck Soft Tissue    3. Chronic systolic heart failure  Overview:  EF 35 to 40% with severe hypokinesia of the LAD  Doppler flow suggestive of grade 1 diastolic dysfunction    Orders:  -     CBC & Differential  -     Comprehensive Metabolic Panel  -     Magnesium    4. Other fatigue  -     CBC & Differential  -     Comprehensive Metabolic Panel  -     Magnesium    5. Gastroesophageal reflux disease without esophagitis  -     famotidine (Pepcid) 20 MG tablet; Take 1 tablet by mouth 2 (Two) Times a Day.  Dispense: 20 tablet; Refill: 0       Understands disease processes and need for medications.  Understands reasons for urgent and emergent care.  Patient (& family) verbalized agreement for treatment plan.   Emotional support and active listening provided.  Patient provided time to verbalize feelings.    EKG without any significant changes.  Will fax to cardio provider.   2 week trial of pepcid.     RTC 3 months, sooner if needed.           This document has been electronically signed by:  NANCY Jessica FNP-C Dragon disclaimer:  Part of this note may be an electronic transcription/translation of spoken language to printed text using the Dragon Dictation System.

## 2023-08-08 LAB
ALBUMIN SERPL-MCNC: 5.2 G/DL (ref 3.5–5.2)
ALBUMIN/GLOB SERPL: 2 G/DL
ALP SERPL-CCNC: 67 U/L (ref 39–117)
ALT SERPL W P-5'-P-CCNC: 34 U/L (ref 1–41)
ANION GAP SERPL CALCULATED.3IONS-SCNC: 9 MMOL/L (ref 5–15)
AST SERPL-CCNC: 26 U/L (ref 1–40)
BASOPHILS # BLD AUTO: 0.07 10*3/MM3 (ref 0–0.2)
BASOPHILS NFR BLD AUTO: 1 % (ref 0–1.5)
BILIRUB SERPL-MCNC: 0.7 MG/DL (ref 0–1.2)
BUN SERPL-MCNC: 10 MG/DL (ref 6–20)
BUN/CREAT SERPL: 12 (ref 7–25)
CALCIUM SPEC-SCNC: 10.2 MG/DL (ref 8.6–10.5)
CHLORIDE SERPL-SCNC: 103 MMOL/L (ref 98–107)
CO2 SERPL-SCNC: 28 MMOL/L (ref 22–29)
CREAT SERPL-MCNC: 0.83 MG/DL (ref 0.76–1.27)
DEPRECATED RDW RBC AUTO: 38.9 FL (ref 37–54)
EGFRCR SERPLBLD CKD-EPI 2021: 111.4 ML/MIN/1.73
EOSINOPHIL # BLD AUTO: 0.08 10*3/MM3 (ref 0–0.4)
EOSINOPHIL NFR BLD AUTO: 1.1 % (ref 0.3–6.2)
ERYTHROCYTE [DISTWIDTH] IN BLOOD BY AUTOMATED COUNT: 12.7 % (ref 12.3–15.4)
GLOBULIN UR ELPH-MCNC: 2.6 GM/DL
GLUCOSE SERPL-MCNC: 84 MG/DL (ref 65–99)
HCT VFR BLD AUTO: 47.8 % (ref 37.5–51)
HGB BLD-MCNC: 16.4 G/DL (ref 13–17.7)
IMM GRANULOCYTES # BLD AUTO: 0.02 10*3/MM3 (ref 0–0.05)
IMM GRANULOCYTES NFR BLD AUTO: 0.3 % (ref 0–0.5)
LYMPHOCYTES # BLD AUTO: 2.75 10*3/MM3 (ref 0.7–3.1)
LYMPHOCYTES NFR BLD AUTO: 37.6 % (ref 19.6–45.3)
MAGNESIUM SERPL-MCNC: 2.5 MG/DL (ref 1.6–2.6)
MCH RBC QN AUTO: 29.4 PG (ref 26.6–33)
MCHC RBC AUTO-ENTMCNC: 34.3 G/DL (ref 31.5–35.7)
MCV RBC AUTO: 85.7 FL (ref 79–97)
MONOCYTES # BLD AUTO: 0.59 10*3/MM3 (ref 0.1–0.9)
MONOCYTES NFR BLD AUTO: 8.1 % (ref 5–12)
NEUTROPHILS NFR BLD AUTO: 3.81 10*3/MM3 (ref 1.7–7)
NEUTROPHILS NFR BLD AUTO: 51.9 % (ref 42.7–76)
NRBC BLD AUTO-RTO: 0 /100 WBC (ref 0–0.2)
PLATELET # BLD AUTO: 294 10*3/MM3 (ref 140–450)
PMV BLD AUTO: 10.7 FL (ref 6–12)
POTASSIUM SERPL-SCNC: 4.8 MMOL/L (ref 3.5–5.2)
PROT SERPL-MCNC: 7.8 G/DL (ref 6–8.5)
RBC # BLD AUTO: 5.58 10*6/MM3 (ref 4.14–5.8)
SODIUM SERPL-SCNC: 140 MMOL/L (ref 136–145)
WBC NRBC COR # BLD: 7.32 10*3/MM3 (ref 3.4–10.8)

## 2023-08-29 ENCOUNTER — HOSPITAL ENCOUNTER (OUTPATIENT)
Dept: ULTRASOUND IMAGING | Facility: HOSPITAL | Age: 43
Discharge: HOME | End: 2023-08-29
Payer: COMMERCIAL

## 2023-08-29 PROCEDURE — 76536 US EXAM OF HEAD AND NECK: CPT

## 2023-08-30 NOTE — PROGRESS NOTES
Small nodule noted in the area on the side of his neck.  Who would he like to see for a biopsy?   Reports says could be a cyst or a lymph node.

## 2023-09-05 DIAGNOSIS — M79.89 NODULE OF SOFT TISSUE: Primary | ICD-10-CM

## 2023-09-12 ENCOUNTER — OFFICE VISIT (OUTPATIENT)
Dept: SURGERY | Facility: CLINIC | Age: 43
End: 2023-09-12
Payer: COMMERCIAL

## 2023-09-12 VITALS — HEIGHT: 71 IN | BODY MASS INDEX: 32.34 KG/M2 | WEIGHT: 231 LBS

## 2023-09-12 DIAGNOSIS — D23.4 CYST, DERMOID, SCALP AND NECK: Primary | ICD-10-CM

## 2023-09-12 DIAGNOSIS — D23.4 CYST, DERMOID, SCALP AND NECK: ICD-10-CM

## 2023-09-12 NOTE — PROGRESS NOTES
Subjective   George Ribeiro is a 43 y.o. male.     Chief Complaint: cyst on neck    History of Present Illness He had a lump come up on the back of the neck a couple of weeks ago. It did get rather large but now is much smaller. No drainage or bleeding. It is not tender now.     The following portions of the patient's history were reviewed and updated as appropriate: current medications, past family history, past medical history, past social history, past surgical history and problem list.    Review of Systems    Objective   Physical Exam 1.5 cm cyst excised from back of neck near the hairline with lidocaine and nylon sutures.    Past Medical History:   Diagnosis Date    Coronary artery disease     GERD (gastroesophageal reflux disease)     Hyperlipidemia     Hypertension     Myocardial infarction        Family History   Problem Relation Age of Onset    Arrhythmia Mother     Heart attack Father     Hypertension Father     Heart disease Brother     Hypertension Brother     Heart attack Paternal Uncle        Social History     Tobacco Use    Smoking status: Never    Smokeless tobacco: Never   Vaping Use    Vaping Use: Never used   Substance Use Topics    Alcohol use: Never    Drug use: Never       Past Surgical History:   Procedure Laterality Date    CARDIAC CATHETERIZATION  11/2021    [facility]    CARDIAC DEFIBRILLATOR PLACEMENT N/A 04/2022    [facility]    COLONOSCOPY      CORONARY STENT PLACEMENT N/A 11/2021    [facility]    UPPER GASTROINTESTINAL ENDOSCOPY         Current Outpatient Medications   Medication Instructions    cetirizine (ZYRTEC) 10 mg, Oral    citalopram (CELEXA) 40 mg, Oral, Daily    clopidogrel (PLAVIX) 75 mg, Oral, Daily    Entresto 24-26 MG tablet 1 tablet, 2 Times Daily    eplerenone (INSPRA) 25 mg, Oral, Daily, Take 1/2 pill once day    famotidine (PEPCID) 20 mg, Oral, 2 Times Daily    metoprolol succinate XL (TOPROL-XL) 25 mg, Daily    nitroglycerin (NITROSTAT) 0.4 mg, Every 5  Minutes PRN    Praluent 75 mg, Subcutaneous, Take As Directed, Patient stated dose is every two weeks     rosuvastatin (CRESTOR) 40 mg, Oral, Daily         Assessment & Plan   Diagnoses and all orders for this visit:    1. Cyst, dermoid, scalp and neck (Primary)      Remove sutures 1 wk

## 2023-09-13 LAB — REF LAB TEST METHOD: NORMAL

## 2023-09-20 ENCOUNTER — OFFICE VISIT (OUTPATIENT)
Dept: FAMILY MEDICINE CLINIC | Facility: CLINIC | Age: 43
End: 2023-09-20
Payer: COMMERCIAL

## 2023-09-20 VITALS
DIASTOLIC BLOOD PRESSURE: 60 MMHG | TEMPERATURE: 97.7 F | SYSTOLIC BLOOD PRESSURE: 105 MMHG | HEIGHT: 71 IN | HEART RATE: 54 BPM | BODY MASS INDEX: 32.34 KG/M2 | OXYGEN SATURATION: 98 % | WEIGHT: 231 LBS

## 2023-09-20 DIAGNOSIS — Z48.02 VISIT FOR SUTURE REMOVAL: Primary | ICD-10-CM

## 2023-09-20 NOTE — PROGRESS NOTES
"    Subjective        Chief Complaint  Suture / Staple Removal    Subjective      George Ribeiro is a 43 y.o. male who presents today to [facility] for Suture / Staple Removal.     Suture / Staple Removal:  George underwent excision of a dermoid cyst on the left posterior neck by general surgery last week.  Today is day 8 from the procedure and he is here for suture removal.  He has had some mild soreness in the area which is worse with moving the neck.  Denies any redness, drainage, swelling, fever, chills.  Pathology returned as a ruptured epidermal inclusion cyst.      Current Outpatient Medications:     Alirocumab (Praluent) 75 MG/ML solution auto-injector, Inject 1 mL under the skin into the appropriate area as directed Take As Directed. Patient stated dose is every two weeks, Disp: , Rfl:     cetirizine (zyrTEC) 10 MG tablet, Take 1 tablet by mouth., Disp: , Rfl:     citalopram (CeleXA) 40 MG tablet, TAKE 1 TABLET BY MOUTH DAILY, Disp: 30 tablet, Rfl: 5    clopidogrel (PLAVIX) 75 MG tablet, Take 1 tablet by mouth Daily., Disp: , Rfl:     Entresto 24-26 MG tablet, 1 tablet 2 (Two) Times a Day., Disp: , Rfl:     eplerenone (INSPRA) 25 MG tablet, Take 1 tablet by mouth Daily. Take 1/2 pill once day, Disp: , Rfl:     famotidine (Pepcid) 20 MG tablet, Take 1 tablet by mouth 2 (Two) Times a Day., Disp: 20 tablet, Rfl: 0    metoprolol succinate XL (TOPROL-XL) 25 MG 24 hr tablet, 1 tablet Daily., Disp: , Rfl:     nitroglycerin (NITROSTAT) 0.4 MG SL tablet, 1 tablet Every 5 (Five) Minutes As Needed., Disp: , Rfl:     rosuvastatin (CRESTOR) 40 MG tablet, Take 1 tablet by mouth Daily., Disp: , Rfl:       No Known Allergies    Objective     Objective   Vital Signs:  Blood Pressure 105/60   Pulse 54   Temperature 97.7 °F (36.5 °C) (Temporal)   Height 180.3 cm (70.98\")   Weight 105 kg (231 lb)   Oxygen Saturation 98%   Body Mass Index 32.23 kg/m²   Estimated body mass index is " "32.23 kg/m² as calculated from the following:    Height as of this encounter: 180.3 cm (70.98\").    Weight as of this encounter: 105 kg (231 lb).    Past Medical History:   Diagnosis Date    Coronary artery disease     GERD (gastroesophageal reflux disease)     Hyperlipidemia     Hypertension     Myocardial infarction      Past Surgical History:   Procedure Laterality Date    CARDIAC CAT[facility]IZATION  11/2021    [facility]    CARDIAC DEFIBRILLATOR PLACEMENT N/A 04/2022    [facility]    COLONOSCOPY      CORONARY STENT PLACEMENT N/A 11/2021    [facility]    UPPER GASTROINTESTINAL ENDOSCOPY       Social History     Socioeconomic History    Marital status:    Tobacco Use    Smoking status: Never    Smokeless tobacco: Never   Vaping Use    Vaping Use: Never used   Substance and Sexual Activity    Alcohol use: Never    Drug use: Never    Sexual activity: Defer      Physical Exam  Vitals and nursing note reviewed.   Constitutional:       Appearance: Normal appearance.   HENT:      Head: Normocephalic and atraumatic.   Skin:     Comments: 2 sutures located at the base of the left posterior neck. Wound is clean/dry. Mild surrounding skin irritation felt to be 2/2 the dressing. No irritation appreciated on the wound itself.    Neurological:      Mental Status: He is alert.      Result Review :  The following data was reviewed by: ADE Mcdonald on 09/20/2023:  Hemoglobin A1C   Date Value Ref Range Status   07/06/2023 5.70 (H) 4.80 - 5.60 % Final     TSH   Date Value Ref Range Status   07/06/2023 2.610 0.270 - 4.200 uIU/mL Final     HDL Cholesterol   Date Value Ref Range Status   07/06/2023 50 40 - 60 mg/dL Final     LDL Cholesterol    Date Value Ref Range Status   07/06/2023 99 0 - 100 mg/dL Final     Triglycerides   Date Value Ref Range Status   07/06/2023 92 0 - 150 mg/dL Final     Suture Removal    Date/Time: 9/20/2023 12:41 PM  Performed by: Janet Garcia PA  Authorized by: Janet Garcia " ADE Suggs   Consent: Verbal consent obtained.  Risks and benefits: risks, benefits and alternatives were discussed  Consent given by: patient  Patient understanding: patient states understanding of the procedure being performed  Body area: head/neck  Location details: neck  Wound Appearance: clean  Sutures Removed: 2  Patient tolerance: patient tolerated the procedure well with no immediate complications  Comments: Left open to air as the dressing seems to be causing local irritation.        Assessment / Plan         Assessment   Diagnoses and all orders for this visit:    1. Visit for suture removal (Primary)  2 sutures removed per procedure note above. Tolerated well without any immediate complications.   Area was left open to air as the dressing he had applied appeared to be causing some localized irritation.   Encouraged on keeping dry for the next 24 hours. Then can allow warm water/soap to run over top in the shower.   Monitor for redness, warmth, drainage, or increased pain.   -     Suture Removal    Follow Up   Return if symptoms worsen or fail to improve.    Patient was given instructions and counseling regarding his condition or for health maintenance advice. Please see specific information pulled into the AVS if appropriate.       This document has been electronically signed by ADE Mcdonald   September 20, 2023 12:52 EDT    Dictated Utilizing Dragon Dictation: Part of this note may be an electronic transcription/translation of spoken language to printed text using the Dragon Dictation System.

## 2023-12-11 DIAGNOSIS — F40.10 SOCIAL ANXIETY DISORDER: ICD-10-CM

## 2023-12-12 RX ORDER — CITALOPRAM 40 MG/1
40 TABLET ORAL DAILY
Qty: 30 TABLET | Refills: 5 | Status: SHIPPED | OUTPATIENT
Start: 2023-12-12

## 2024-01-03 ENCOUNTER — OFFICE VISIT (OUTPATIENT)
Dept: FAMILY MEDICINE CLINIC | Facility: CLINIC | Age: 44
End: 2024-01-03
Payer: COMMERCIAL

## 2024-01-03 VITALS
WEIGHT: 235 LBS | BODY MASS INDEX: 32.9 KG/M2 | HEART RATE: 70 BPM | SYSTOLIC BLOOD PRESSURE: 100 MMHG | RESPIRATION RATE: 14 BRPM | OXYGEN SATURATION: 100 % | HEIGHT: 71 IN | DIASTOLIC BLOOD PRESSURE: 74 MMHG | TEMPERATURE: 98.4 F

## 2024-01-03 DIAGNOSIS — R42 DIZZINESS: Primary | ICD-10-CM

## 2024-01-03 RX ORDER — METOPROLOL TARTRATE 50 MG/1
50 TABLET, FILM COATED ORAL 2 TIMES DAILY
COMMUNITY

## 2024-01-03 RX ORDER — CEFTRIAXONE 1 G/1
1 INJECTION, POWDER, FOR SOLUTION INTRAMUSCULAR; INTRAVENOUS ONCE
Status: COMPLETED | OUTPATIENT
Start: 2024-01-03 | End: 2024-01-03

## 2024-01-03 RX ORDER — AMOXICILLIN AND CLAVULANATE POTASSIUM 875; 125 MG/1; MG/1
1 TABLET, FILM COATED ORAL EVERY 12 HOURS SCHEDULED
Qty: 20 TABLET | Refills: 0 | Status: SHIPPED | OUTPATIENT
Start: 2024-01-03 | End: 2024-01-13

## 2024-01-03 RX ADMIN — CEFTRIAXONE 1 G: 1 INJECTION, POWDER, FOR SOLUTION INTRAMUSCULAR; INTRAVENOUS at 12:55
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
## 2024-06-12 ENCOUNTER — TELEPHONE (OUTPATIENT)
Dept: FAMILY MEDICINE CLINIC | Facility: CLINIC | Age: 44
End: 2024-06-12

## 2024-06-12 DIAGNOSIS — F40.10 SOCIAL ANXIETY DISORDER: ICD-10-CM

## 2024-06-12 RX ORDER — CITALOPRAM 40 MG/1
40 TABLET ORAL DAILY
Qty: 30 TABLET | Refills: 5 | Status: SHIPPED | OUTPATIENT
Start: 2024-06-12 | End: 2024-06-13

## 2024-06-12 NOTE — TELEPHONE ENCOUNTER
Caller: George Ribeiro    Relationship: Self    Best call back number: [phone]    Requested Prescriptions:   Requested Prescriptions      No prescriptions requested or ordered in this encounter      citalopram (CeleXA) 40 MG tablet     Pharmacy where request should be sent: [id number] [address] [facility] [address] - [phone] PH - [phone] FX     Last office visit with prescribing clinician: 1/3/2024   Last telemedicine visit with prescribing clinician: Visit date not found   Next office visit with prescribing clinician: Visit date not found       Does the patient have less than a 3 day supply:  [] Yes  [x] No    Would you like a call back once the refill request has been completed: [] Yes [x] No    If the office needs to give you a call back, can they leave a voicemail: [] Yes [x] No    Lexie Stephen, PCT   06/12/24 15:47 EDT

## 2024-06-13 DIAGNOSIS — F40.10 SOCIAL ANXIETY DISORDER: ICD-10-CM

## 2024-06-13 RX ORDER — CITALOPRAM 40 MG/1
40 TABLET ORAL DAILY
Qty: 30 TABLET | Refills: 5 | Status: SHIPPED | OUTPATIENT
Start: 2024-06-13

## 2024-08-27 ENCOUNTER — OFFICE VISIT (OUTPATIENT)
Dept: FAMILY MEDICINE CLINIC | Facility: CLINIC | Age: 44
End: 2024-08-27
Payer: COMMERCIAL

## 2024-08-27 VITALS
RESPIRATION RATE: 14 BRPM | HEART RATE: 75 BPM | DIASTOLIC BLOOD PRESSURE: 70 MMHG | HEIGHT: 71 IN | WEIGHT: 237.6 LBS | OXYGEN SATURATION: 96 % | BODY MASS INDEX: 33.26 KG/M2 | SYSTOLIC BLOOD PRESSURE: 108 MMHG

## 2024-08-27 DIAGNOSIS — E78.5 DYSLIPIDEMIA: ICD-10-CM

## 2024-08-27 DIAGNOSIS — F40.10 SOCIAL ANXIETY DISORDER: ICD-10-CM

## 2024-08-27 DIAGNOSIS — I10 ESSENTIAL HYPERTENSION: ICD-10-CM

## 2024-08-27 DIAGNOSIS — R09.81 SINUS CONGESTION: ICD-10-CM

## 2024-08-27 DIAGNOSIS — R50.9 FEVER, UNSPECIFIED FEVER CAUSE: Primary | ICD-10-CM

## 2024-08-27 DIAGNOSIS — R52 BODY ACHES: ICD-10-CM

## 2024-08-27 LAB
EXPIRATION DATE: NORMAL
EXPIRATION DATE: NORMAL
FLUAV RNA RESP QL NAA+PROBE: NEGATIVE
FLUBV RNA RESP QL NAA+PROBE: NEGATIVE
INTERNAL CONTROL: NORMAL
INTERNAL CONTROL: NORMAL
Lab: NORMAL
Lab: NORMAL
S PYO AG THROAT QL: NEGATIVE

## 2024-08-27 PROCEDURE — 87880 STREP A ASSAY W/OPTIC: CPT | Performed by: NURSE PRACTITIONER

## 2024-08-27 PROCEDURE — 96372 THER/PROPH/DIAG INJ SC/IM: CPT | Performed by: NURSE PRACTITIONER

## 2024-08-27 PROCEDURE — 87502 INFLUENZA DNA AMP PROBE: CPT | Performed by: NURSE PRACTITIONER

## 2024-08-27 PROCEDURE — 99214 OFFICE O/P EST MOD 30 MIN: CPT | Performed by: NURSE PRACTITIONER

## 2024-08-27 RX ORDER — CEFTRIAXONE 1 G/1
1 INJECTION, POWDER, FOR SOLUTION INTRAMUSCULAR; INTRAVENOUS ONCE
Status: COMPLETED | OUTPATIENT
Start: 2024-08-27 | End: 2024-08-27

## 2024-08-27 RX ADMIN — CEFTRIAXONE 1 G: 1 INJECTION, POWDER, FOR SOLUTION INTRAMUSCULAR; INTRAVENOUS at 11:26

## 2024-08-27 NOTE — PROGRESS NOTES
Subjective   George Ribeiro is a 44 y.o. male.     Chief Complaint   Patient presents with    Generalized Body Aches       History of Present Illness     Body Aches-has been feeling ill since Saturday.  He has had 2 negative home covid test, last night and again this morning.  He has had generalized fever of 101.  Some ear pain.  No sore throat. HA is present but not dizziness.  No GI symptoms.  He reports some congestion but he does not feel SOA.  He has taken Tylenol.    Cardio-had his follow up in August.  He will follow up again in April 2025.  He did not have any medication changes.  He has been added Jardiance per his cardio.  He is tolerating well.  No other medication changes.  He continues Metoprolol and plavix.  No s/sx of bleeding.  He has CHF.  He is also on Entresto 24-26 mg.  No obvious edema.    Anxiety-ongoing.  On Celexa.  No negative side effects.  Mood has been stable.   Hyperlipidemia-on Crestor.  No negative side effects.  Patient denies any negative side effects of cholesterol medication.  No reported myalgia or myopathies.    The following portions of the patient's history were reviewed and updated as appropriate: CC, ROS, allergies, current medications, past family history, past medical history, past social history, past surgical history and problem list.      Review of Systems   Constitutional:  Positive for chills, fatigue and fever. Negative for appetite change, unexpected weight gain and unexpected weight loss.   HENT:  Positive for congestion. Negative for ear pain, postnasal drip, rhinorrhea, sore throat, swollen glands, trouble swallowing and voice change.    Eyes:  Negative for blurred vision, double vision, pain and visual disturbance.   Respiratory:  Negative for cough, chest tightness, shortness of breath and wheezing.    Cardiovascular:  Negative for chest pain, palpitations and leg swelling.   Gastrointestinal:  Negative for abdominal pain, blood in stool, constipation,  "diarrhea, nausea and indigestion.   Genitourinary:  Negative for dysuria, hematuria and urgency.   Musculoskeletal:  Positive for arthralgias and myalgias. Negative for back pain, gait problem and joint swelling.   Skin:  Negative for color change and skin lesions.   Allergic/Immunologic: Negative.    Neurological:  Positive for headache. Negative for dizziness and numbness.   Hematological: Negative.    Psychiatric/Behavioral:  Positive for sleep disturbance (due to pain/body aches). Negative for dysphoric mood and suicidal ideas. The patient is not nervous/anxious.    All other systems reviewed and are negative.      Objective     /70   Pulse 75   Resp 14   Ht 180.3 cm (70.98\")   Wt 108 kg (237 lb 9.6 oz)   SpO2 96%   BMI 33.16 kg/m²     Physical Exam  Vitals reviewed.   Constitutional:       General: He is not in acute distress.     Appearance: Normal appearance. He is well-developed.   HENT:      Head: Normocephalic and atraumatic.      Right Ear: Ear canal and external ear normal.      Left Ear: Tympanic membrane, ear canal and external ear normal.      Nose: Congestion and rhinorrhea present.      Mouth/Throat:      Mouth: Mucous membranes are moist.      Pharynx: Posterior oropharyngeal erythema (moderate) present.   Eyes:      General: No scleral icterus.     Pupils: Pupils are equal, round, and reactive to light.   Neck:      Thyroid: No thyromegaly.      Vascular: No JVD.   Cardiovascular:      Rate and Rhythm: Normal rate and regular rhythm.      Heart sounds: Normal heart sounds.   Pulmonary:      Effort: Pulmonary effort is normal.      Breath sounds: Normal breath sounds.   Abdominal:      General: Bowel sounds are normal.      Palpations: Abdomen is soft.      Tenderness: There is no abdominal tenderness.   Musculoskeletal:         General: Tenderness (generalized) present.      Cervical back: Normal range of motion and neck supple.   Skin:     General: Skin is warm and dry.      Capillary " Refill: Capillary refill takes less than 2 seconds.   Neurological:      Mental Status: He is alert and oriented to person, place, and time.      Cranial Nerves: No cranial nerve deficit.      Coordination: Coordination normal.      Gait: Gait normal.   Psychiatric:         Behavior: Behavior normal.         Thought Content: Thought content normal.         Judgment: Judgment normal.         Diagnoses and all orders for this visit:    1. Fever, unspecified fever cause (Primary)  -     POCT rapid strep A  -     POCT Flu A&B, Molecular    2. Body aches  -     POCT rapid strep A  -     POCT Flu A&B, Molecular    3. Sinus congestion  -     cefTRIAXone (ROCEPHIN) injection 1 g    4. Essential hypertension  Assessment & Plan:  Continue Metoprolol 50 mg   Continue Entresto.  Continue under the care of cardiology      5. Dyslipidemia  Assessment & Plan:  Continue Crestor 40 mg and Praluent 75 mg as directed.  Continue to work on diet changes.      6. Social anxiety disorder  Assessment & Plan:  Continue Celexa.    Stress reduction advised (examples:  make time for self, Reading, Listening to music, Exercise, keeping a journal, venting to a confidant, etc.)           BMI is >= 30 and <35. (Class 1 Obesity). The following options were offered after discussion;: nutrition counseling/recommendations      Understands disease processes and need for medications.  Understands reasons for urgent and emergent care.  Patient (& family) verbalized agreement for treatment plan.   Emotional support and active listening provided.  Patient provided time to verbalize feelings.    Injections today while in the office for acute symptom relief.     Continue under the care of cardiology.   Will plan for updated labs.     RTC 2-3 months, sooner if needed.             This document has been electronically signed by:  NANCY Jessica FNP-C Dragon disclaimer:  Part of this note may be an electronic transcription/translation of spoken  language to printed text using the Dragon Dictation System.

## 2024-11-27 ENCOUNTER — OFFICE VISIT (OUTPATIENT)
Dept: FAMILY MEDICINE CLINIC | Facility: CLINIC | Age: 44
End: 2024-11-27
Payer: COMMERCIAL

## 2024-11-27 VITALS
WEIGHT: 243 LBS | DIASTOLIC BLOOD PRESSURE: 70 MMHG | SYSTOLIC BLOOD PRESSURE: 92 MMHG | BODY MASS INDEX: 34.02 KG/M2 | HEART RATE: 54 BPM | HEIGHT: 71 IN | OXYGEN SATURATION: 98 % | RESPIRATION RATE: 12 BRPM

## 2024-11-27 DIAGNOSIS — Z23 ENCOUNTER FOR IMMUNIZATION: ICD-10-CM

## 2024-11-27 DIAGNOSIS — E55.9 VITAMIN D DEFICIENCY: ICD-10-CM

## 2024-11-27 DIAGNOSIS — E53.8 VITAMIN B12 DEFICIENCY: ICD-10-CM

## 2024-11-27 DIAGNOSIS — I10 ESSENTIAL HYPERTENSION: Primary | ICD-10-CM

## 2024-11-27 DIAGNOSIS — Z95.5 S/P CORONARY ARTERY STENT PLACEMENT: ICD-10-CM

## 2024-11-27 DIAGNOSIS — R00.2 PALPITATIONS: ICD-10-CM

## 2024-11-27 DIAGNOSIS — F40.10 SOCIAL ANXIETY DISORDER: ICD-10-CM

## 2024-11-27 DIAGNOSIS — E78.5 DYSLIPIDEMIA: ICD-10-CM

## 2024-11-27 PROBLEM — G47.33 OSA ON CPAP: Status: ACTIVE | Noted: 2024-11-27

## 2024-11-27 PROCEDURE — 83735 ASSAY OF MAGNESIUM: CPT | Performed by: NURSE PRACTITIONER

## 2024-11-27 PROCEDURE — 80061 LIPID PANEL: CPT | Performed by: NURSE PRACTITIONER

## 2024-11-27 PROCEDURE — 80050 GENERAL HEALTH PANEL: CPT | Performed by: NURSE PRACTITIONER

## 2024-11-27 PROCEDURE — 84439 ASSAY OF FREE THYROXINE: CPT | Performed by: NURSE PRACTITIONER

## 2024-11-27 PROCEDURE — 82607 VITAMIN B-12: CPT | Performed by: NURSE PRACTITIONER

## 2024-11-27 PROCEDURE — 82306 VITAMIN D 25 HYDROXY: CPT | Performed by: NURSE PRACTITIONER

## 2024-11-27 PROCEDURE — 83036 HEMOGLOBIN GLYCOSYLATED A1C: CPT | Performed by: NURSE PRACTITIONER

## 2024-11-27 RX ORDER — CITALOPRAM HYDROBROMIDE 40 MG/1
40 TABLET ORAL DAILY
Qty: 30 TABLET | Refills: 5 | Status: SHIPPED | OUTPATIENT
Start: 2024-11-27

## 2024-11-27 NOTE — PROGRESS NOTES
"Subjective   George Ribeiro is a 44 y.o. male.     Chief Complaint   Patient presents with    Dizziness       History of Present Illness     Hypertension/history of MI/CHF-ongoing.  Patient under the care of cardiology.  He is currently on Plavix 75 mg, Jardiance 10 mg, Entresto 24-26 mg, Inspra 25 mg and metoprolol 50 mg BID.   He reports that he has noted some heart palpitations.  He has a home monitoring system with his Defibrillator.  He reports that he recorded an episode but his device only picks up certain abnormalities.  This occurred about 1.5 weeks ago.  He reports \"always in the mornings\".  He had been drinking some diet tea and stopped it about 4 days ago but palpitations are occurring.  He reports has been occurring \"couple of weeks\".   He plans to call his cardiology provider for a sooner appt.  He reports that he feels very fatigued and has noted some leg weakness when palpitations occur.  He reports that he has not noted any SOA.  No CP but some sensation of aching on the outer side of his left chest wall and is not associated with palpitations.  He last saw Cardio in August.    Hyperlipidemia/CAD-ongoing.  Patient currently anticoagulated on Plavix 75 mg.  He is also on rosuvastatin 40 mg.  He is on Praulent injection Q 2 weeks.   Patient denies any negative side effects of cholesterol medication.  No reported myalgia or myopathies.  Depression & anxiety-ongoing.  Patient currently on Celexa 40 mg.  No negative side effects.  He is tolerating well.   Depression and anxiety are doing well.   Labs-no recent labs. He would like to obtain today.     The following portions of the patient's history were reviewed and updated as appropriate: CC, ROS, allergies, current medications, past family history, past medical history, past social history, past surgical history and problem list.      Review of Systems   Constitutional:  Positive for fatigue. Negative for appetite change, unexpected weight gain and " "unexpected weight loss.   HENT:  Negative for congestion, ear pain, postnasal drip, rhinorrhea, sore throat, swollen glands, trouble swallowing and voice change.    Eyes:  Negative for pain and visual disturbance.   Respiratory:  Negative for cough, chest tightness, shortness of breath and wheezing.    Cardiovascular:  Negative for chest pain, palpitations and leg swelling.   Gastrointestinal:  Negative for abdominal pain, blood in stool, constipation, diarrhea, nausea and indigestion.   Genitourinary:  Negative for dysuria, hematuria and urgency.   Musculoskeletal:  Negative for arthralgias, back pain, gait problem, joint swelling and myalgias.   Skin:  Negative for color change and skin lesions.   Allergic/Immunologic: Negative.    Neurological:  Positive for light-headedness. Negative for dizziness, numbness and headache.   Hematological: Negative.    Psychiatric/Behavioral:  Negative for dysphoric mood, sleep disturbance and suicidal ideas. The patient is not nervous/anxious.    All other systems reviewed and are negative.      Objective     BP 92/70   Pulse 54   Resp 12   Ht 180.3 cm (70.98\")   Wt 110 kg (243 lb)   SpO2 98%   BMI 33.91 kg/m²     Physical Exam  Vitals reviewed.   Constitutional:       General: He is not in acute distress.     Appearance: He is well-developed. He is not diaphoretic.   HENT:      Head: Normocephalic and atraumatic.      Jaw: No tenderness.      Right Ear: Hearing, tympanic membrane, ear canal and external ear normal.      Left Ear: Hearing, tympanic membrane, ear canal and external ear normal.      Nose: Nose normal. No nasal tenderness or congestion.      Right Sinus: No maxillary sinus tenderness or frontal sinus tenderness.      Left Sinus: No maxillary sinus tenderness or frontal sinus tenderness.      Mouth/Throat:      Lips: Pink.      Mouth: Mucous membranes are moist.      Pharynx: Oropharynx is clear. Uvula midline.   Eyes:      General: Lids are normal. No scleral " icterus.     Extraocular Movements:      Right eye: Normal extraocular motion and no nystagmus.      Left eye: Normal extraocular motion and no nystagmus.      Conjunctiva/sclera: Conjunctivae normal.      Pupils: Pupils are equal, round, and reactive to light.   Neck:      Thyroid: No thyromegaly or thyroid tenderness.      Vascular: No carotid bruit or JVD.      Trachea: No tracheal tenderness.   Cardiovascular:      Rate and Rhythm: Normal rate and regular rhythm.      Pulses:           Dorsalis pedis pulses are 2+ on the right side and 2+ on the left side.        Posterior tibial pulses are 2+ on the right side and 2+ on the left side.      Heart sounds: Normal heart sounds, S1 normal and S2 normal. No murmur heard.  Pulmonary:      Effort: Pulmonary effort is normal. No accessory muscle usage, prolonged expiration or respiratory distress.      Breath sounds: Normal breath sounds.   Chest:      Chest wall: No tenderness.   Abdominal:      General: Bowel sounds are normal. There is no distension.      Palpations: Abdomen is soft. There is no hepatomegaly, splenomegaly or mass.      Tenderness: There is no abdominal tenderness.   Musculoskeletal:         General: No tenderness.      Cervical back: Normal range of motion and neck supple.      Right lower leg: No edema.      Left lower leg: No edema.      Comments: No muscular atrophy or flaccidity.   Lymphadenopathy:      Head:      Right side of head: No submental or submandibular adenopathy.      Left side of head: No submental or submandibular adenopathy.      Cervical: No cervical adenopathy.      Right cervical: No superficial cervical adenopathy.     Left cervical: No superficial cervical adenopathy.   Skin:     General: Skin is warm and dry.      Capillary Refill: Capillary refill takes less than 2 seconds.      Coloration: Skin is not jaundiced or pale.      Findings: No erythema.      Nails: There is no clubbing.   Neurological:      Mental Status: He is  alert and oriented to person, place, and time.      Cranial Nerves: No cranial nerve deficit or facial asymmetry.      Sensory: No sensory deficit.      Motor: No weakness, tremor, atrophy or abnormal muscle tone.      Coordination: Coordination normal.      Deep Tendon Reflexes: Reflexes are normal and symmetric.   Psychiatric:         Attention and Perception: He is attentive.         Mood and Affect: Mood normal. Mood is not anxious or depressed.         Speech: Speech normal.         Behavior: Behavior normal. Behavior is cooperative.         Thought Content: Thought content normal.         Cognition and Memory: Cognition normal.         Judgment: Judgment normal.         Diagnoses and all orders for this visit:    1. Essential hypertension (Primary)  Assessment & Plan:  Continue Metoprolol 50 mg   Continue Entresto.  Continue under the care of cardiology  Continue Jardiance    Orders:  -     CBC & Differential  -     Comprehensive Metabolic Panel  -     Hemoglobin A1c    2. Palpitations  Assessment & Plan:  Labs today.   continue Metoprolol    Orders:  -     TSH  -     T4, Free  -     Magnesium  -     Holter Monitor - 72 Hour Up To 15 Days; Future    3. Social anxiety disorder  Comments:  Continue Celexa  Assessment & Plan:  Continue Celexa.    Stress reduction advised (examples:  make time for self, Reading, Listening to music, Exercise, keeping a journal, venting to a confidant, etc.)      Orders:  -     citalopram (CeleXA) 40 MG tablet; Take 1 tablet by mouth Daily.  Dispense: 30 tablet; Refill: 5  -     TSH  -     T4, Free    4. Dyslipidemia  Assessment & Plan:  Continue Crestor 40 mg and Praluent 75 mg as directed.  Continue to work on diet changes.    Orders:  -     Lipid Panel  -     Hemoglobin A1c    5. S/P coronary artery stent placement    6. Vitamin D deficiency  -     Vitamin D,25-Hydroxy    7. Vitamin B12 deficiency  -     Vitamin B12    8. Encounter for immunization  -     Fluzone >6mos  ([id number])      Understands disease processes and need for medications.  Understands reasons for urgent and emergent care.  Patient (& family) verbalized agreement for treatment plan.   Emotional support and active listening provided.  Patient provided time to verbalize feelings.    Flu vaccine administered today.  Understands risk and benefits.  Understands may be 2-3 week before immunity is obtained.    Labs ordered.  Will obtain today.      RTC 6 months, sooner if needed.           This document has been electronically signed by:  NANCY Jessica, FNP-C    Dragon disclaimer:  Part of this note may be an electronic transcription/translation of spoken language to printed text using the Dragon Dictation System.

## 2024-11-28 LAB
25(OH)D3 SERPL-MCNC: 28.6 NG/ML (ref 30–100)
ALBUMIN SERPL-MCNC: 4.8 G/DL (ref 3.5–5.2)
ALBUMIN/GLOB SERPL: 1.7 G/DL
ALP SERPL-CCNC: 63 U/L (ref 39–117)
ALT SERPL W P-5'-P-CCNC: 27 U/L (ref 1–41)
ANION GAP SERPL CALCULATED.3IONS-SCNC: 10 MMOL/L (ref 5–15)
AST SERPL-CCNC: 23 U/L (ref 1–40)
BASOPHILS # BLD AUTO: 0.05 10*3/MM3 (ref 0–0.2)
BASOPHILS NFR BLD AUTO: 0.8 % (ref 0–1.5)
BILIRUB SERPL-MCNC: 0.7 MG/DL (ref 0–1.2)
BUN SERPL-MCNC: 11 MG/DL (ref 6–20)
BUN/CREAT SERPL: 12.1 (ref 7–25)
CALCIUM SPEC-SCNC: 10.2 MG/DL (ref 8.6–10.5)
CHLORIDE SERPL-SCNC: 100 MMOL/L (ref 98–107)
CHOLEST SERPL-MCNC: 147 MG/DL (ref 0–200)
CO2 SERPL-SCNC: 28 MMOL/L (ref 22–29)
CREAT SERPL-MCNC: 0.91 MG/DL (ref 0.76–1.27)
DEPRECATED RDW RBC AUTO: 39.9 FL (ref 37–54)
EGFRCR SERPLBLD CKD-EPI 2021: 106.6 ML/MIN/1.73
EOSINOPHIL # BLD AUTO: 0.08 10*3/MM3 (ref 0–0.4)
EOSINOPHIL NFR BLD AUTO: 1.3 % (ref 0.3–6.2)
ERYTHROCYTE [DISTWIDTH] IN BLOOD BY AUTOMATED COUNT: 12.5 % (ref 12.3–15.4)
GLOBULIN UR ELPH-MCNC: 2.8 GM/DL
GLUCOSE SERPL-MCNC: 79 MG/DL (ref 65–99)
HBA1C MFR BLD: 5.5 % (ref 4.8–5.6)
HCT VFR BLD AUTO: 49.1 % (ref 37.5–51)
HDLC SERPL-MCNC: 48 MG/DL (ref 40–60)
HGB BLD-MCNC: 16.8 G/DL (ref 13–17.7)
IMM GRANULOCYTES # BLD AUTO: 0.02 10*3/MM3 (ref 0–0.05)
IMM GRANULOCYTES NFR BLD AUTO: 0.3 % (ref 0–0.5)
LDLC SERPL CALC-MCNC: 74 MG/DL (ref 0–100)
LDLC/HDLC SERPL: 1.45 {RATIO}
LYMPHOCYTES # BLD AUTO: 2.38 10*3/MM3 (ref 0.7–3.1)
LYMPHOCYTES NFR BLD AUTO: 37.7 % (ref 19.6–45.3)
MAGNESIUM SERPL-MCNC: 2.4 MG/DL (ref 1.6–2.6)
MCH RBC QN AUTO: 30.1 PG (ref 26.6–33)
MCHC RBC AUTO-ENTMCNC: 34.2 G/DL (ref 31.5–35.7)
MCV RBC AUTO: 88 FL (ref 79–97)
MONOCYTES # BLD AUTO: 0.48 10*3/MM3 (ref 0.1–0.9)
MONOCYTES NFR BLD AUTO: 7.6 % (ref 5–12)
NEUTROPHILS NFR BLD AUTO: 3.31 10*3/MM3 (ref 1.7–7)
NEUTROPHILS NFR BLD AUTO: 52.3 % (ref 42.7–76)
NRBC BLD AUTO-RTO: 0 /100 WBC (ref 0–0.2)
PLATELET # BLD AUTO: 278 10*3/MM3 (ref 140–450)
PMV BLD AUTO: 10.2 FL (ref 6–12)
POTASSIUM SERPL-SCNC: 4.8 MMOL/L (ref 3.5–5.2)
PROT SERPL-MCNC: 7.6 G/DL (ref 6–8.5)
RBC # BLD AUTO: 5.58 10*6/MM3 (ref 4.14–5.8)
SODIUM SERPL-SCNC: 138 MMOL/L (ref 136–145)
T4 FREE SERPL-MCNC: 1.08 NG/DL (ref 0.92–1.68)
TRIGL SERPL-MCNC: 146 MG/DL (ref 0–150)
TSH SERPL DL<=0.05 MIU/L-ACNC: 5.12 UIU/ML (ref 0.27–4.2)
VIT B12 BLD-MCNC: 515 PG/ML (ref 211–946)
VLDLC SERPL-MCNC: 25 MG/DL (ref 5–40)
WBC NRBC COR # BLD AUTO: 6.32 10*3/MM3 (ref 3.4–10.8)

## 2024-12-03 DIAGNOSIS — R94.6 ABNORMAL THYROID FUNCTION TEST: Primary | ICD-10-CM

## 2024-12-10 DIAGNOSIS — R94.6 ABNORMAL THYROID FUNCTION TEST: Primary | ICD-10-CM

## 2024-12-27 ENCOUNTER — OFFICE VISIT (OUTPATIENT)
Dept: ENDOCRINOLOGY | Facility: CLINIC | Age: 44
End: 2024-12-27
Payer: COMMERCIAL

## 2024-12-27 VITALS
HEART RATE: 53 BPM | OXYGEN SATURATION: 98 % | BODY MASS INDEX: 34.41 KG/M2 | DIASTOLIC BLOOD PRESSURE: 72 MMHG | SYSTOLIC BLOOD PRESSURE: 111 MMHG | WEIGHT: 246.6 LBS

## 2024-12-27 DIAGNOSIS — R94.6 ABNORMAL THYROID FUNCTION TEST: Primary | ICD-10-CM

## 2024-12-27 LAB
T3FREE SERPL-MCNC: 2.94 PG/ML (ref 2–4.4)
T4 FREE SERPL-MCNC: 1.04 NG/DL (ref 0.92–1.68)
TSH SERPL DL<=0.05 MIU/L-ACNC: 7.7 UIU/ML (ref 0.27–4.2)

## 2024-12-27 PROCEDURE — 84445 ASSAY OF TSI GLOBULIN: CPT

## 2024-12-27 PROCEDURE — 84443 ASSAY THYROID STIM HORMONE: CPT

## 2024-12-27 PROCEDURE — 84481 FREE ASSAY (FT-3): CPT

## 2024-12-27 PROCEDURE — 84439 ASSAY OF FREE THYROXINE: CPT

## 2024-12-27 PROCEDURE — 86376 MICROSOMAL ANTIBODY EACH: CPT

## 2024-12-27 PROCEDURE — 83520 IMMUNOASSAY QUANT NOS NONAB: CPT

## 2024-12-27 NOTE — PROGRESS NOTES
Chief Complaint   Patient presents with    initial visit     Elevated thyroid     HPI   George Ribeiro is a 44 y.o. male who presents to the clinic today for initial evaluation of abnormal thyroid labs. On 3/7/2023 TSH was 0.055, it was rechecked on 4/13/2023 and was less than 0.005. The patient denies any signs or symptoms of hyperthyroidism at that time. TSH was normal at 2.6 on 7/6/2023.  Most recent TSH on 11/27/2024 was elevated at 5.12. He denies any signs or symptoms of thyroiditis in March 2023. He reports getting routine lab work and thyroid function was found to be abnormal. He denies any family history of autoimmune thyroid disease.    Birth state: [place]  Previous history of radiation to face/neck: no  Consuming foods high in iodine: no  Family history of thyroid complications: no  Previous imaging: Thyroid US 8/29/23  Biotin: no    The following portions of the patient's history were reviewed and updated as appropriate: allergies, current medications, past family history, past medical history, past social history, past surgical history, and problem list.    /72 (BP Location: Right arm, Patient Position: Sitting, Cuff Size: Large Adult)   Pulse 53   Wt 112 kg (246 lb 9.6 oz)   SpO2 98%   BMI 34.41 kg/m²    Past Medical History:   Diagnosis Date    Coronary artery disease     GERD (gastroesophageal reflux disease)     Hyperlipidemia     Hypertension     Myocardial infarction      Past Surgical History:   Procedure Laterality Date    CARDIAC CATHETERIZATION  11/2021    [facility]    CARDIAC DEFIBRILLATOR PLACEMENT N/A 04/2022    [facility]    COLONOSCOPY      CORONARY STENT PLACEMENT N/A 11/2021    [facility]    UPPER GASTROINTESTINAL ENDOSCOPY        Family History   Problem Relation Age of Onset    Arrhythmia Mother     Heart attack Father     Hypertension Father     Heart disease Brother     Hypertension Brother     Heart attack Paternal Uncle       Social History     Socioeconomic  History    Marital status:    Tobacco Use    Smoking status: Never    Smokeless tobacco: Never   Vaping Use    Vaping status: Never Used   Substance and Sexual Activity    Alcohol use: Never    Drug use: Never    Sexual activity: Defer      No Known Allergies   Current Outpatient Medications on File Prior to Visit   Medication Sig Dispense Refill    Alirocumab (Praluent) 75 MG/ML solution auto-injector Inject 1 mL under the skin into the appropriate area as directed Take As Directed. Patient stated dose is every two weeks      citalopram (CeleXA) 40 MG tablet Take 1 tablet by mouth Daily. 30 tablet 5    clopidogrel (PLAVIX) 75 MG tablet Take 1 tablet by mouth Daily.      empagliflozin (Jardiance) 10 MG tablet tablet Take 1 tablet by mouth Daily.      Entresto 24-26 MG tablet 1 tablet 2 (Two) Times a Day.      eplerenone (INSPRA) 25 MG tablet Take 1 tablet by mouth Daily. Take 1/2 pill once day      metoprolol tartrate (LOPRESSOR) 50 MG tablet Take 1 tablet by mouth 2 (Two) Times a Day.      nitroglycerin (NITROSTAT) 0.4 MG SL tablet 1 tablet Every 5 (Five) Minutes As Needed.      rosuvastatin (CRESTOR) 40 MG tablet Take 1 tablet by mouth Daily.      [DISCONTINUED] cetirizine (zyrTEC) 10 MG tablet Take 1 tablet by mouth. (Patient not taking: Reported on 12/27/2024)       No current facility-administered medications on file prior to visit.      Review of Systems   Constitutional:  Positive for fatigue. Negative for unexpected weight gain and unexpected weight loss.   HENT:  Negative for trouble swallowing and voice change.    Cardiovascular:  Negative for palpitations.   Gastrointestinal:  Negative for constipation and diarrhea.   Endocrine: Positive for cold intolerance.   Neurological:  Negative for tremors.   Psychiatric/Behavioral:  Positive for sleep disturbance. The patient is nervous/anxious.      Physical Exam  Vitals reviewed.   Constitutional:       Appearance: Normal appearance.   HENT:      Head:  Normocephalic.   Eyes:      Pupils: Pupils are equal, round, and reactive to light.   Neck:      Thyroid: No thyroid mass, thyromegaly or thyroid tenderness.   Cardiovascular:      Pulses: Normal pulses.   Pulmonary:      Effort: Pulmonary effort is normal.   Abdominal:      Palpations: Abdomen is soft.   Musculoskeletal:      Cervical back: Normal range of motion.   Skin:     General: Skin is warm and dry.   Neurological:      Mental Status: He is alert and oriented to person, place, and time.   Psychiatric:         Mood and Affect: Mood normal.         Behavior: Behavior normal.       Labs/Imaging    Thyroid US 8/29/23  EXAM:    US Soft Tissues Head and Neck, Thyroid     EXAM DATE:    8/29/2023 3:17 PM     CLINICAL HISTORY:    abnormal thyroid test; R94.6-Abnormal results of thyroid function  studies     TECHNIQUE:    Real-time ultrasound scan of the thyroid gland and soft tissues of the  neck with image documentation.     COMPARISON:    No relevant prior studies available.     FINDINGS:    LEFT THYROID LOBE:  Unremarkable.  No enlarged or calcified nodules.    RIGHT THYROID LOBE:  Unremarkable.  No enlarged or calcified nodules.    ISTHMUS:  Unremarkable.  No enlarged or calcified nodules.    LYMPH NODES:  Unremarkable.  No lymphadenopathy.     IMPRESSION:    No acute thyroid findings.     This report was finalized on 8/29/2023 3:29 PM by Dr. Candido Hughes MD.       CMP  Lab Results   Component Value Date    GLUCOSE 79 11/27/2024    BUN 11 11/27/2024    CREATININE 0.91 11/27/2024    EGFRIFNONA 135 08/23/2021    BCR 12.1 11/27/2024    K 4.8 11/27/2024    CO2 28.0 11/27/2024    CALCIUM 10.2 11/27/2024    ALBUMIN 4.8 11/27/2024    AGRATIO 1.7 11/27/2024    AST 23 11/27/2024    ALT 27 11/27/2024      CBC w/DIFF   Lab Results   Component Value Date    WBC 6.32 11/27/2024    RBC 5.58 11/27/2024    HGB 16.8 11/27/2024    HCT 49.1 11/27/2024    MCV 88.0 11/27/2024    MCH 30.1 11/27/2024    MCHC 34.2 11/27/2024    RDW  "12.5 11/27/2024    RDWSD 39.9 11/27/2024    MPV 10.2 11/27/2024     11/27/2024    NEUTRORELPCT 52.3 11/27/2024    LYMPHORELPCT 37.7 11/27/2024    MONORELPCT 7.6 11/27/2024    EOSRELPCT 1.3 11/27/2024    BASORELPCT 0.8 11/27/2024    AUTOIGPER 0.3 11/27/2024    NEUTROABS 3.31 11/27/2024    LYMPHSABS 2.38 11/27/2024    MONOSABS 0.48 11/27/2024    EOSABS 0.08 11/27/2024    BASOSABS 0.05 11/27/2024    AUTOIGNUM 0.02 11/27/2024    NRBC 0.0 11/27/2024     TSH  Lab Results   Component Value Date    TSH 5.120 (H) 11/27/2024    TSH 2.610 07/06/2023    TSH <0.005 (L) 04/13/2023     T4  Lab Results   Component Value Date    FREET4 1.08 11/27/2024    FREET4 0.99 07/06/2023    FREET4 2.21 (H) 04/13/2023     No results found for: \"J7IRLYB\"  T3  Lab Results   Component Value Date    T3FREE 3.09 07/06/2023     No results found for: \"D9LVGXA\"  TRAb  No results found for: \"TSURCPAB\"  TPO  Lab Results   Component Value Date    THYROIDAB 22 07/06/2023     Assessment and Plan  Diagnoses and all orders for this visit:    1. Abnormal thyroid function test (Primary)  Assessment & Plan:  -Appears clinically euthyroid  -TFT's today and antibody testing for further evaluation  -Patient was previously hyperthyroid last year and is now hypothyroid. Patient denies any s/s, he reports all of these abnormal labs were found by routine lab work.   -Further evaluation and treatment may be necessary depending on lab work.  -If his TSH remains less than 10 with normal FT4 he would not require T4 therapy unless he is symptomatic    Orders:  -     T3, Free  -     T4, Free  -     TSH  -     Thyroid Stimulating Immunoglobulin  -     Thyrotropin Receptor Antibody  -     Thyroid Peroxidase Antibody       Return in about 6 weeks (around 2/7/2025). The patient was instructed to contact the clinic with any interval questions or concerns.    Please note that portions of this document were completed with a voice recognition program. Efforts were made to " edit the dictations, but occasionally words are mis-transcribed.  This document has been electronically signed by NANCY Flores  December 27, 2024 09:27 EST

## 2024-12-27 NOTE — ASSESSMENT & PLAN NOTE
-Appears clinically euthyroid  -TFT's today and antibody testing for further evaluation  -Patient was previously hyperthyroid last year and is now hypothyroid. Patient denies any s/s, he reports all of these abnormal labs were found by routine lab work.   -Further evaluation and treatment may be necessary depending on lab work.  -If his TSH remains less than 10 with normal FT4 he would not require T4 therapy unless he is symptomatic

## 2024-12-28 LAB
THYROPEROXIDASE AB SERPL-ACNC: <9 IU/ML (ref 0–34)
TSH RECEP AB SER-ACNC: <1.1 IU/L (ref 0–1.75)

## 2024-12-29 LAB — TSI SER-ACNC: <0.1 IU/L (ref 0–0.55)

## 2025-07-01 ENCOUNTER — TELEPHONE (OUTPATIENT)
Dept: ENDOCRINOLOGY | Facility: CLINIC | Age: 45
End: 2025-07-01

## 2025-07-01 NOTE — TELEPHONE ENCOUNTER
Caller: George Ribeiro    Relationship: Self    Best call back number: [phone]         What was the call regarding: PATIENT STATES THAT ON HIS NEW PATIENT VISIT HE DISCUSSED TRYING NEW MEDICATION. PATIENT STATES HE IS READY TO TRY MEDICATION IF PRESCRIPTION CAN BE WRITTEN. PLEASE CONTACT PATIENT AND ADVISE.

## 2025-07-02 NOTE — TELEPHONE ENCOUNTER
I saw him once in December 2024. We would need to recheck his thyroid labs. He needs to come in for an appointment and we can do lab work and see if he still needs medication and what dose he would need based on his blood work. Thyroid labs can fluctuate frequently.

## 2025-07-09 ENCOUNTER — OFFICE VISIT (OUTPATIENT)
Dept: ENDOCRINOLOGY | Facility: CLINIC | Age: 45
End: 2025-07-09
Payer: COMMERCIAL

## 2025-07-09 ENCOUNTER — LAB (OUTPATIENT)
Dept: LAB | Facility: HOSPITAL | Age: 45
End: 2025-07-09
Payer: COMMERCIAL

## 2025-07-09 ENCOUNTER — TRANSCRIBE ORDERS (OUTPATIENT)
Dept: ADMINISTRATIVE | Facility: HOSPITAL | Age: 45
End: 2025-07-09
Payer: COMMERCIAL

## 2025-07-09 VITALS
WEIGHT: 240 LBS | BODY MASS INDEX: 33.49 KG/M2 | DIASTOLIC BLOOD PRESSURE: 72 MMHG | OXYGEN SATURATION: 97 % | SYSTOLIC BLOOD PRESSURE: 111 MMHG | HEART RATE: 55 BPM

## 2025-07-09 DIAGNOSIS — E78.5 HYPERLIPIDEMIA, UNSPECIFIED HYPERLIPIDEMIA TYPE: ICD-10-CM

## 2025-07-09 DIAGNOSIS — I25.10 DISEASE OF CARDIOVASCULAR SYSTEM: Primary | ICD-10-CM

## 2025-07-09 DIAGNOSIS — I25.10 DISEASE OF CARDIOVASCULAR SYSTEM: ICD-10-CM

## 2025-07-09 DIAGNOSIS — E03.9 ACQUIRED HYPOTHYROIDISM: Primary | ICD-10-CM

## 2025-07-09 LAB
ALP SERPL-CCNC: 68 U/L (ref 39–117)
ALT SERPL W P-5'-P-CCNC: 27 U/L (ref 1–41)
AST SERPL-CCNC: 28 U/L (ref 1–40)
CHOLEST SERPL-MCNC: 125 MG/DL (ref 0–200)
HDLC SERPL-MCNC: 49 MG/DL (ref 40–60)
LDLC SERPL CALC-MCNC: 57 MG/DL (ref 0–100)
LDLC/HDLC SERPL: 1.13 {RATIO}
T3FREE SERPL-MCNC: 3.16 PG/ML (ref 2–4.4)
T4 FREE SERPL-MCNC: 0.91 NG/DL (ref 0.92–1.68)
TRIGL SERPL-MCNC: 102 MG/DL (ref 0–150)
TSH SERPL DL<=0.05 MIU/L-ACNC: 4.32 UIU/ML (ref 0.27–4.2)
VLDLC SERPL-MCNC: 19 MG/DL (ref 5–40)

## 2025-07-09 PROCEDURE — 84443 ASSAY THYROID STIM HORMONE: CPT

## 2025-07-09 PROCEDURE — 36415 COLL VENOUS BLD VENIPUNCTURE: CPT

## 2025-07-09 PROCEDURE — 80061 LIPID PANEL: CPT

## 2025-07-09 PROCEDURE — 84460 ALANINE AMINO (ALT) (SGPT): CPT

## 2025-07-09 PROCEDURE — 84439 ASSAY OF FREE THYROXINE: CPT

## 2025-07-09 PROCEDURE — 84450 TRANSFERASE (AST) (SGOT): CPT

## 2025-07-09 PROCEDURE — 84075 ASSAY ALKALINE PHOSPHATASE: CPT

## 2025-07-09 PROCEDURE — 84481 FREE ASSAY (FT-3): CPT

## 2025-07-09 RX ORDER — METOPROLOL SUCCINATE 25 MG/1
0.5 TABLET, EXTENDED RELEASE ORAL DAILY
COMMUNITY
Start: 2025-06-08

## 2025-07-09 RX ORDER — FUROSEMIDE 20 MG/1
TABLET ORAL
COMMUNITY
Start: 2025-06-23

## 2025-07-09 RX ORDER — EVOLOCUMAB 140 MG/ML
140 INJECTION, SOLUTION SUBCUTANEOUS
COMMUNITY
Start: 2025-06-19

## 2025-07-09 NOTE — PROGRESS NOTES
Chief Complaint   Patient presents with    Follow-up     Abnormal thyroid function test       HPI   George Ribeiro is a 45 y.o. male who presents to the clinic today for follow-up of subclinical hypothyroidism. He reports recent symptoms of nausea, fatigue and diaphoresis. He went to the ER for further evaluation as this is how he felt when he had his MI in the past. They did a cardiac cath which he reports was good. No stents placed. He is having increased fatigue, cold intolerance and nausea and believes it could be related to his thyroid. He would like to start on thyroid medication to see if this helps his symptoms. He denies any changes in his neck, dysphagia or compressive symptoms.     Birth state: [place]  Previous history of radiation to face/neck: no  Consuming foods high in iodine: no  Family history of thyroid complications: no  Previous imaging: Thyroid US 8/29/23  Biotin: no    The following portions of the patient's history were reviewed and updated as appropriate: allergies, current medications, past family history, past medical history, past social history, past surgical history, and problem list.    /72 (BP Location: Right arm, Patient Position: Sitting, Cuff Size: Adult)   Pulse 55   Wt 109 kg (240 lb)   SpO2 97%   BMI 33.49 kg/m²    Past Medical History:   Diagnosis Date    Coronary artery disease     GERD (gastroesophageal reflux disease)     Hyperlipidemia     Hypertension     Myocardial infarction      Past Surgical History:   Procedure Laterality Date    CARDIAC CATHETERIZATION  11/2021    [facility]    CARDIAC DEFIBRILLATOR PLACEMENT N/A 04/2022    [facility] center    COLONOSCOPY      CORONARY STENT PLACEMENT N/A 11/2021    [facility]    UPPER GASTROINTESTINAL ENDOSCOPY        Family History   Problem Relation Age of Onset    Arrhythmia Mother     Heart attack Father     Hypertension Father     Heart disease Brother     Hypertension Brother     Heart attack Paternal Uncle        Social History     Socioeconomic History    Marital status:    Tobacco Use    Smoking status: Never    Smokeless tobacco: Never   Vaping Use    Vaping status: Never Used   Substance and Sexual Activity    Alcohol use: Never    Drug use: Never    Sexual activity: Defer      No Known Allergies   Current Outpatient Medications on File Prior to Visit   Medication Sig Dispense Refill    citalopram (CeleXA) 40 MG tablet Take 1 tablet by mouth Daily. 30 tablet 5    clopidogrel (PLAVIX) 75 MG tablet Take 1 tablet by mouth Daily.      empagliflozin (Jardiance) 10 MG tablet tablet Take 1 tablet by mouth Daily.      Entresto 24-26 MG tablet 1 tablet 2 (Two) Times a Day.      eplerenone (INSPRA) 25 MG tablet Take 1 tablet by mouth Daily. Take 1/2 pill once day      furosemide (LASIX) 20 MG tablet       metoprolol succinate XL (TOPROL-XL) 25 MG 24 hr tablet Take 0.5 tablets by mouth Daily.      nitroglycerin (NITROSTAT) 0.4 MG SL tablet 1 tablet Every 5 (Five) Minutes As Needed.      Repatha SureClick solution auto-injector SureClick injection Inject 1 mL under the skin into the appropriate area as directed.      rosuvastatin (CRESTOR) 40 MG tablet Take 1 tablet by mouth Daily.       No current facility-administered medications on file prior to visit.      Review of Systems   Constitutional:  Positive for fatigue. Negative for unexpected weight gain and unexpected weight loss.   HENT:  Negative for trouble swallowing and voice change.    Cardiovascular:  Negative for palpitations.   Gastrointestinal:  Positive for nausea. Negative for constipation and diarrhea.   Endocrine: Positive for cold intolerance and heat intolerance.   Neurological:  Negative for tremors.   Psychiatric/Behavioral:  Positive for sleep disturbance. Negative for negative for hyperactivity. The patient is not nervous/anxious.      Physical Exam  Vitals reviewed.   Constitutional:       Appearance: Normal appearance.   HENT:      Head: Normocephalic.    Eyes:      Pupils: Pupils are equal, round, and reactive to light.   Neck:      Thyroid: No thyroid mass, thyromegaly or thyroid tenderness.   Cardiovascular:      Pulses: Normal pulses.   Pulmonary:      Effort: Pulmonary effort is normal.   Abdominal:      Palpations: Abdomen is soft.   Musculoskeletal:      Cervical back: Normal range of motion.   Skin:     General: Skin is warm and dry.   Neurological:      Mental Status: He is alert and oriented to person, place, and time.   Psychiatric:         Mood and Affect: Mood normal.         Behavior: Behavior normal.       Labs/Imaging    Thyroid US 8/29/23  EXAM:    US Soft Tissues Head and Neck, Thyroid     EXAM DATE:    8/29/2023 3:17 PM     CLINICAL HISTORY:    abnormal thyroid test; R94.6-Abnormal results of thyroid function  studies     TECHNIQUE:    Real-time ultrasound scan of the thyroid gland and soft tissues of the  neck with image documentation.     COMPARISON:    No relevant prior studies available.     FINDINGS:    LEFT THYROID LOBE:  Unremarkable.  No enlarged or calcified nodules.    RIGHT THYROID LOBE:  Unremarkable.  No enlarged or calcified nodules.    ISTHMUS:  Unremarkable.  No enlarged or calcified nodules.    LYMPH NODES:  Unremarkable.  No lymphadenopathy.     IMPRESSION:    No acute thyroid findings.     This report was finalized on 8/29/2023 3:29 PM by Dr. Candido Hughes MD.       CMP  Lab Results   Component Value Date    GLUCOSE 79 11/27/2024    BUN 11 11/27/2024    CREATININE 0.91 11/27/2024    EGFRIFNONA 135 08/23/2021    BCR 12.1 11/27/2024    K 4.8 11/27/2024    CO2 28.0 11/27/2024    CALCIUM 10.2 11/27/2024    ALBUMIN 4.8 11/27/2024    AGRATIO 1.7 11/27/2024    AST 28 07/09/2025    ALT 27 07/09/2025      CBC w/DIFF   Lab Results   Component Value Date    WBC 6.32 11/27/2024    RBC 5.58 11/27/2024    HGB 16.8 11/27/2024    HCT 49.1 11/27/2024    MCV 88.0 11/27/2024    MCH 30.1 11/27/2024    MCHC 34.2 11/27/2024    RDW 12.5 11/27/2024     "RDWSD 39.9 11/27/2024    MPV 10.2 11/27/2024     11/27/2024    NEUTRORELPCT 52.3 11/27/2024    LYMPHORELPCT 37.7 11/27/2024    MONORELPCT 7.6 11/27/2024    EOSRELPCT 1.3 11/27/2024    BASORELPCT 0.8 11/27/2024    AUTOIGPER 0.3 11/27/2024    NEUTROABS 3.31 11/27/2024    LYMPHSABS 2.38 11/27/2024    MONOSABS 0.48 11/27/2024    EOSABS 0.08 11/27/2024    BASOSABS 0.05 11/27/2024    AUTOIGNUM 0.02 11/27/2024    NRBC 0.0 11/27/2024     TSH  Lab Results   Component Value Date    TSH 4.320 (H) 07/09/2025    TSH 7.700 (H) 12/27/2024    TSH 5.120 (H) 11/27/2024     T4  Lab Results   Component Value Date    FREET4 0.91 (L) 07/09/2025    FREET4 1.04 12/27/2024    FREET4 1.08 11/27/2024     No results found for: \"J3YECBC\"  T3  Lab Results   Component Value Date    T3FREE 3.16 07/09/2025    T3FREE 2.94 12/27/2024    T3FREE 3.09 07/06/2023     No results found for: \"G0BISPB\"  TRAb  Lab Results   Component Value Date    TSURCPAB <1.10 12/27/2024     TPO  Lab Results   Component Value Date    THYROIDAB <9 12/27/2024     Assessment and Plan  Diagnoses and all orders for this visit:    1. Acquired hypothyroidism (Primary)  Assessment & Plan:  -Appears clinically hypothyroid.  -TFT's today with medication initiation if indicated.  -We have discussed in detail the nature of the thyroid disease, thyroid hormone action, optimal TSH goals of 0.5-3.5, method of administration of levothyroxine and medication interactions.  I recommended taking the medication on an empty stomach in the morning or at bedtime, at least 30 minutes prior to intake of food or hot drinks and 4 hours apart from calcium or iron supplements. If a dose is missed patient can take two the following day.      Orders:  -     T4, Free  -     T3, Free  -     TSH      Return in about 3 months (around 10/9/2025). The patient was instructed to contact the clinic with any interval questions or concerns.    Please note that portions of this document were completed with a " voice recognition program. Efforts were made to edit the dictations, but occasionally words are mis-transcribed.  This document has been electronically signed by NANCY Flores  July 10, 2025 22:48 EDT

## 2025-07-10 PROBLEM — E03.9 ACQUIRED HYPOTHYROIDISM: Chronic | Status: ACTIVE | Noted: 2024-12-27

## 2025-07-10 PROBLEM — E03.9 ACQUIRED HYPOTHYROIDISM: Status: ACTIVE | Noted: 2024-12-27

## 2025-07-11 DIAGNOSIS — E03.9 ACQUIRED HYPOTHYROIDISM: Primary | Chronic | ICD-10-CM

## 2025-07-11 NOTE — ASSESSMENT & PLAN NOTE
-Appears clinically hypothyroid.  -TFT's today with medication initiation if indicated.  -We have discussed in detail the nature of the thyroid disease, thyroid hormone action, optimal TSH goals of 0.5-3.5, method of administration of levothyroxine and medication interactions.  I recommended taking the medication on an empty stomach in the morning or at bedtime, at least 30 minutes prior to intake of food or hot drinks and 4 hours apart from calcium or iron supplements. If a dose is missed patient can take two the following day.

## 2025-07-31 DIAGNOSIS — F40.10 SOCIAL ANXIETY DISORDER: ICD-10-CM

## 2025-07-31 RX ORDER — CITALOPRAM HYDROBROMIDE 40 MG/1
40 TABLET ORAL DAILY
Qty: 30 TABLET | Refills: 5 | Status: SHIPPED | OUTPATIENT
Start: 2025-07-31

## 2025-08-13 DIAGNOSIS — F40.10 SOCIAL ANXIETY DISORDER: ICD-10-CM

## 2025-08-25 RX ORDER — CITALOPRAM HYDROBROMIDE 40 MG/1
40 TABLET ORAL DAILY
Qty: 30 TABLET | Refills: 5 | Status: SHIPPED | OUTPATIENT
Start: 2025-08-25